# Patient Record
Sex: MALE | Race: WHITE | NOT HISPANIC OR LATINO | Employment: OTHER | URBAN - METROPOLITAN AREA
[De-identification: names, ages, dates, MRNs, and addresses within clinical notes are randomized per-mention and may not be internally consistent; named-entity substitution may affect disease eponyms.]

---

## 2017-05-04 ENCOUNTER — TRANSCRIBE ORDERS (OUTPATIENT)
Dept: URGENT CARE | Age: 62
End: 2017-05-04

## 2017-05-04 ENCOUNTER — OFFICE VISIT (OUTPATIENT)
Dept: URGENT CARE | Age: 62
End: 2017-05-04
Payer: COMMERCIAL

## 2017-05-04 ENCOUNTER — HOSPITAL ENCOUNTER (OUTPATIENT)
Dept: RADIOLOGY | Age: 62
Discharge: HOME | End: 2017-05-04
Attending: FAMILY MEDICINE | Admitting: FAMILY MEDICINE
Payer: COMMERCIAL

## 2017-05-04 DIAGNOSIS — S49.92XA UNSPECIFIED INJURY OF LEFT SHOULDER AND UPPER ARM, INITIAL ENCOUNTER: ICD-10-CM

## 2017-05-04 DIAGNOSIS — H49.21 SIXTH NERVE PALSY OF RIGHT EYE: ICD-10-CM

## 2017-05-04 LAB
ATRIAL RATE: 64 BPM
P AXIS: 36 DEGREES
PR INTERVAL: 196 MS
QRS AXIS: 4 DEGREES
QRSD INTERVAL: 116 MS
QT INTERVAL: 414 MS
QTC INTERVAL: 427 MS
T WAVE AXIS: 29 DEGREES
VENTRICULAR RATE: 64 BPM

## 2017-05-04 PROCEDURE — 73030 X-RAY EXAM OF SHOULDER: CPT

## 2017-05-04 PROCEDURE — 99203 OFFICE O/P NEW LOW 30 MIN: CPT | Performed by: FAMILY MEDICINE

## 2017-05-04 PROCEDURE — 72040 X-RAY EXAM NECK SPINE 2-3 VW: CPT

## 2017-05-04 PROCEDURE — 93005 ELECTROCARDIOGRAM TRACING: CPT

## 2017-05-09 ENCOUNTER — ALLSCRIPTS OFFICE VISIT (OUTPATIENT)
Dept: OTHER | Facility: OTHER | Age: 62
End: 2017-05-09

## 2017-05-09 ENCOUNTER — TRANSCRIBE ORDERS (OUTPATIENT)
Dept: ADMINISTRATIVE | Facility: HOSPITAL | Age: 62
End: 2017-05-09

## 2017-05-09 DIAGNOSIS — M54.12 BRACHIAL NEURITIS OR RADICULITIS NOS: Primary | ICD-10-CM

## 2017-05-10 ENCOUNTER — ALLSCRIPTS OFFICE VISIT (OUTPATIENT)
Dept: OTHER | Facility: OTHER | Age: 62
End: 2017-05-10

## 2017-05-18 ENCOUNTER — HOSPITAL ENCOUNTER (OUTPATIENT)
Dept: MRI IMAGING | Facility: HOSPITAL | Age: 62
Discharge: HOME | End: 2017-05-18
Payer: COMMERCIAL

## 2017-05-18 DIAGNOSIS — M54.12 BRACHIAL NEURITIS OR RADICULITIS NOS: ICD-10-CM

## 2017-05-18 PROCEDURE — 72141 MRI NECK SPINE W/O DYE: CPT

## 2017-05-22 ENCOUNTER — ALLSCRIPTS OFFICE VISIT (OUTPATIENT)
Dept: OTHER | Facility: OTHER | Age: 62
End: 2017-05-22

## 2017-05-23 ENCOUNTER — TRANSCRIBE ORDERS (OUTPATIENT)
Dept: ADMINISTRATIVE | Facility: HOSPITAL | Age: 62
End: 2017-05-23

## 2017-05-23 ENCOUNTER — ALLSCRIPTS OFFICE VISIT (OUTPATIENT)
Dept: OTHER | Facility: OTHER | Age: 62
End: 2017-05-23

## 2017-05-23 DIAGNOSIS — M54.12 BRACHIAL NEURITIS OR RADICULITIS NOS: Primary | ICD-10-CM

## 2017-05-23 DIAGNOSIS — H49.21 SIXTH NERVE PALSY OF RIGHT EYE: Primary | ICD-10-CM

## 2017-05-26 ENCOUNTER — ALLSCRIPTS OFFICE VISIT (OUTPATIENT)
Dept: OTHER | Facility: OTHER | Age: 62
End: 2017-05-26

## 2017-06-05 ENCOUNTER — HOSPITAL ENCOUNTER (OUTPATIENT)
Dept: NEUROLOGY | Facility: CLINIC | Age: 62
Discharge: HOME | End: 2017-06-05
Payer: COMMERCIAL

## 2017-06-05 DIAGNOSIS — M54.12 BRACHIAL NEURITIS OR RADICULITIS NOS: ICD-10-CM

## 2017-06-05 PROCEDURE — 95912 NRV CNDJ TEST 11-12 STUDIES: CPT

## 2017-06-05 PROCEDURE — 95886 MUSC TEST DONE W/N TEST COMP: CPT

## 2017-06-06 ENCOUNTER — HOSPITAL ENCOUNTER (OUTPATIENT)
Dept: CT IMAGING | Facility: HOSPITAL | Age: 62
Discharge: HOME | End: 2017-06-06
Attending: PSYCHIATRY & NEUROLOGY
Payer: COMMERCIAL

## 2017-06-06 DIAGNOSIS — H49.21 SIXTH NERVE PALSY OF RIGHT EYE: ICD-10-CM

## 2017-06-06 PROCEDURE — 70496 CT ANGIOGRAPHY HEAD: CPT

## 2017-06-06 RX ADMIN — IOHEXOL 100 ML: 350 INJECTION, SOLUTION INTRAVENOUS at 15:05

## 2017-06-19 ENCOUNTER — ALLSCRIPTS OFFICE VISIT (OUTPATIENT)
Dept: OTHER | Facility: OTHER | Age: 62
End: 2017-06-19

## 2017-06-19 DIAGNOSIS — E29.1 TESTICULAR HYPOFUNCTION: ICD-10-CM

## 2017-06-19 DIAGNOSIS — M54.12 RADICULOPATHY OF CERVICAL REGION: ICD-10-CM

## 2017-06-19 DIAGNOSIS — R53.83 OTHER FATIGUE: ICD-10-CM

## 2017-06-26 ENCOUNTER — APPOINTMENT (OUTPATIENT)
Dept: PHYSICAL THERAPY | Facility: CLINIC | Age: 62
End: 2017-06-26
Payer: COMMERCIAL

## 2017-06-26 ENCOUNTER — ALLSCRIPTS OFFICE VISIT (OUTPATIENT)
Dept: OTHER | Facility: OTHER | Age: 62
End: 2017-06-26

## 2017-06-26 PROCEDURE — 97161 PT EVAL LOW COMPLEX 20 MIN: CPT

## 2017-06-29 ENCOUNTER — APPOINTMENT (OUTPATIENT)
Dept: PHYSICAL THERAPY | Facility: CLINIC | Age: 62
End: 2017-06-29
Payer: COMMERCIAL

## 2017-06-29 PROCEDURE — 97110 THERAPEUTIC EXERCISES: CPT

## 2017-06-29 PROCEDURE — 97140 MANUAL THERAPY 1/> REGIONS: CPT

## 2017-07-03 ENCOUNTER — APPOINTMENT (OUTPATIENT)
Dept: PHYSICAL THERAPY | Facility: CLINIC | Age: 62
End: 2017-07-03
Payer: COMMERCIAL

## 2017-07-03 PROCEDURE — 97012 MECHANICAL TRACTION THERAPY: CPT

## 2017-07-03 PROCEDURE — 97110 THERAPEUTIC EXERCISES: CPT

## 2017-07-03 PROCEDURE — 97140 MANUAL THERAPY 1/> REGIONS: CPT

## 2017-07-05 ENCOUNTER — APPOINTMENT (OUTPATIENT)
Dept: PHYSICAL THERAPY | Facility: CLINIC | Age: 62
End: 2017-07-05
Payer: COMMERCIAL

## 2017-07-05 PROCEDURE — 97140 MANUAL THERAPY 1/> REGIONS: CPT

## 2017-07-05 PROCEDURE — 97110 THERAPEUTIC EXERCISES: CPT

## 2017-07-05 PROCEDURE — 97012 MECHANICAL TRACTION THERAPY: CPT

## 2017-07-06 ENCOUNTER — APPOINTMENT (OUTPATIENT)
Dept: PHYSICAL THERAPY | Facility: CLINIC | Age: 62
End: 2017-07-06
Payer: COMMERCIAL

## 2017-07-06 PROCEDURE — 97110 THERAPEUTIC EXERCISES: CPT

## 2017-07-06 PROCEDURE — 97012 MECHANICAL TRACTION THERAPY: CPT

## 2017-07-06 PROCEDURE — 97140 MANUAL THERAPY 1/> REGIONS: CPT

## 2017-07-11 ENCOUNTER — ALLSCRIPTS OFFICE VISIT (OUTPATIENT)
Dept: OTHER | Facility: OTHER | Age: 62
End: 2017-07-11

## 2017-07-11 ENCOUNTER — APPOINTMENT (OUTPATIENT)
Dept: PHYSICAL THERAPY | Facility: CLINIC | Age: 62
End: 2017-07-11
Payer: COMMERCIAL

## 2017-07-11 PROCEDURE — 97110 THERAPEUTIC EXERCISES: CPT

## 2017-07-11 PROCEDURE — 97012 MECHANICAL TRACTION THERAPY: CPT

## 2017-07-11 PROCEDURE — 97140 MANUAL THERAPY 1/> REGIONS: CPT

## 2017-07-12 ENCOUNTER — APPOINTMENT (OUTPATIENT)
Dept: PHYSICAL THERAPY | Facility: CLINIC | Age: 62
End: 2017-07-12
Payer: COMMERCIAL

## 2017-07-12 PROCEDURE — 97110 THERAPEUTIC EXERCISES: CPT

## 2017-07-12 PROCEDURE — 97140 MANUAL THERAPY 1/> REGIONS: CPT

## 2017-07-12 PROCEDURE — 97012 MECHANICAL TRACTION THERAPY: CPT

## 2017-07-13 ENCOUNTER — APPOINTMENT (OUTPATIENT)
Dept: PHYSICAL THERAPY | Facility: CLINIC | Age: 62
End: 2017-07-13
Payer: COMMERCIAL

## 2017-07-13 PROCEDURE — 97140 MANUAL THERAPY 1/> REGIONS: CPT

## 2017-07-13 PROCEDURE — 97110 THERAPEUTIC EXERCISES: CPT

## 2017-07-13 PROCEDURE — 97012 MECHANICAL TRACTION THERAPY: CPT

## 2017-07-18 ENCOUNTER — APPOINTMENT (OUTPATIENT)
Dept: PHYSICAL THERAPY | Facility: CLINIC | Age: 62
End: 2017-07-18
Payer: COMMERCIAL

## 2017-07-18 PROCEDURE — 97110 THERAPEUTIC EXERCISES: CPT

## 2017-07-18 PROCEDURE — 97140 MANUAL THERAPY 1/> REGIONS: CPT

## 2017-07-18 PROCEDURE — 97012 MECHANICAL TRACTION THERAPY: CPT

## 2017-07-19 ENCOUNTER — APPOINTMENT (OUTPATIENT)
Dept: PHYSICAL THERAPY | Facility: CLINIC | Age: 62
End: 2017-07-19
Payer: COMMERCIAL

## 2017-07-19 PROCEDURE — 97012 MECHANICAL TRACTION THERAPY: CPT

## 2017-07-19 PROCEDURE — 97140 MANUAL THERAPY 1/> REGIONS: CPT

## 2017-07-19 PROCEDURE — 97110 THERAPEUTIC EXERCISES: CPT

## 2017-07-20 ENCOUNTER — APPOINTMENT (OUTPATIENT)
Dept: PHYSICAL THERAPY | Facility: CLINIC | Age: 62
End: 2017-07-20
Payer: COMMERCIAL

## 2017-07-20 PROCEDURE — 97140 MANUAL THERAPY 1/> REGIONS: CPT

## 2017-07-20 PROCEDURE — 97012 MECHANICAL TRACTION THERAPY: CPT

## 2017-07-20 PROCEDURE — 97110 THERAPEUTIC EXERCISES: CPT

## 2017-07-24 ENCOUNTER — ALLSCRIPTS OFFICE VISIT (OUTPATIENT)
Dept: OTHER | Facility: OTHER | Age: 62
End: 2017-07-24

## 2017-07-24 DIAGNOSIS — M75.82 OTHER SHOULDER LESIONS, LEFT SHOULDER: ICD-10-CM

## 2017-07-25 ENCOUNTER — APPOINTMENT (OUTPATIENT)
Dept: PHYSICAL THERAPY | Facility: CLINIC | Age: 62
End: 2017-07-25
Payer: COMMERCIAL

## 2017-07-25 PROCEDURE — 97140 MANUAL THERAPY 1/> REGIONS: CPT

## 2017-07-25 PROCEDURE — 97110 THERAPEUTIC EXERCISES: CPT

## 2017-07-25 PROCEDURE — 97012 MECHANICAL TRACTION THERAPY: CPT

## 2017-07-26 ENCOUNTER — APPOINTMENT (OUTPATIENT)
Dept: PHYSICAL THERAPY | Facility: CLINIC | Age: 62
End: 2017-07-26
Payer: COMMERCIAL

## 2017-07-26 ENCOUNTER — GENERIC CONVERSION - ENCOUNTER (OUTPATIENT)
Dept: OTHER | Facility: OTHER | Age: 62
End: 2017-07-26

## 2017-07-26 PROCEDURE — 97140 MANUAL THERAPY 1/> REGIONS: CPT

## 2017-07-26 PROCEDURE — 97110 THERAPEUTIC EXERCISES: CPT

## 2017-07-26 PROCEDURE — 97012 MECHANICAL TRACTION THERAPY: CPT

## 2017-07-27 ENCOUNTER — APPOINTMENT (OUTPATIENT)
Dept: PHYSICAL THERAPY | Facility: CLINIC | Age: 62
End: 2017-07-27
Payer: COMMERCIAL

## 2017-07-27 PROCEDURE — 97012 MECHANICAL TRACTION THERAPY: CPT

## 2017-07-27 PROCEDURE — 97110 THERAPEUTIC EXERCISES: CPT

## 2017-07-27 PROCEDURE — 97140 MANUAL THERAPY 1/> REGIONS: CPT

## 2017-07-31 ENCOUNTER — ALLSCRIPTS OFFICE VISIT (OUTPATIENT)
Dept: OTHER | Facility: OTHER | Age: 62
End: 2017-07-31

## 2017-08-01 ENCOUNTER — APPOINTMENT (OUTPATIENT)
Dept: PHYSICAL THERAPY | Facility: CLINIC | Age: 62
End: 2017-08-01
Payer: COMMERCIAL

## 2017-08-02 ENCOUNTER — APPOINTMENT (OUTPATIENT)
Dept: PHYSICAL THERAPY | Facility: CLINIC | Age: 62
End: 2017-08-02
Payer: COMMERCIAL

## 2017-08-02 PROCEDURE — 97012 MECHANICAL TRACTION THERAPY: CPT

## 2017-08-02 PROCEDURE — 97110 THERAPEUTIC EXERCISES: CPT

## 2017-08-02 PROCEDURE — 97140 MANUAL THERAPY 1/> REGIONS: CPT

## 2017-08-03 ENCOUNTER — APPOINTMENT (OUTPATIENT)
Dept: PHYSICAL THERAPY | Facility: CLINIC | Age: 62
End: 2017-08-03
Payer: COMMERCIAL

## 2017-08-03 ENCOUNTER — GENERIC CONVERSION - ENCOUNTER (OUTPATIENT)
Dept: OTHER | Facility: OTHER | Age: 62
End: 2017-08-03

## 2017-08-03 PROCEDURE — 97140 MANUAL THERAPY 1/> REGIONS: CPT

## 2017-08-03 PROCEDURE — 97012 MECHANICAL TRACTION THERAPY: CPT

## 2017-08-03 PROCEDURE — 97110 THERAPEUTIC EXERCISES: CPT

## 2017-08-24 ENCOUNTER — ALLSCRIPTS OFFICE VISIT (OUTPATIENT)
Dept: OTHER | Facility: OTHER | Age: 62
End: 2017-08-24

## 2017-09-12 ENCOUNTER — APPOINTMENT (OUTPATIENT)
Dept: PHYSICAL THERAPY | Facility: CLINIC | Age: 62
End: 2017-09-12
Payer: COMMERCIAL

## 2017-09-18 ENCOUNTER — APPOINTMENT (OUTPATIENT)
Dept: PHYSICAL THERAPY | Facility: CLINIC | Age: 62
End: 2017-09-18
Payer: COMMERCIAL

## 2017-09-18 ENCOUNTER — GENERIC CONVERSION - ENCOUNTER (OUTPATIENT)
Dept: OTHER | Facility: OTHER | Age: 62
End: 2017-09-18

## 2017-09-18 PROCEDURE — G8985 CARRY GOAL STATUS: HCPCS

## 2017-09-18 PROCEDURE — 97161 PT EVAL LOW COMPLEX 20 MIN: CPT

## 2017-09-18 PROCEDURE — G8984 CARRY CURRENT STATUS: HCPCS

## 2017-09-18 PROCEDURE — 97110 THERAPEUTIC EXERCISES: CPT

## 2017-09-21 ENCOUNTER — APPOINTMENT (OUTPATIENT)
Dept: PHYSICAL THERAPY | Facility: CLINIC | Age: 62
End: 2017-09-21
Payer: COMMERCIAL

## 2017-09-21 PROCEDURE — 97110 THERAPEUTIC EXERCISES: CPT

## 2017-09-21 PROCEDURE — 97140 MANUAL THERAPY 1/> REGIONS: CPT

## 2017-09-25 ENCOUNTER — APPOINTMENT (OUTPATIENT)
Dept: PHYSICAL THERAPY | Facility: CLINIC | Age: 62
End: 2017-09-25
Payer: COMMERCIAL

## 2017-09-25 PROCEDURE — 97140 MANUAL THERAPY 1/> REGIONS: CPT

## 2017-09-25 PROCEDURE — 97110 THERAPEUTIC EXERCISES: CPT

## 2017-09-25 PROCEDURE — 97012 MECHANICAL TRACTION THERAPY: CPT

## 2017-09-28 ENCOUNTER — APPOINTMENT (OUTPATIENT)
Dept: PHYSICAL THERAPY | Facility: CLINIC | Age: 62
End: 2017-09-28
Payer: COMMERCIAL

## 2017-09-28 PROCEDURE — 97110 THERAPEUTIC EXERCISES: CPT

## 2017-09-28 PROCEDURE — 97140 MANUAL THERAPY 1/> REGIONS: CPT

## 2017-09-28 PROCEDURE — 97012 MECHANICAL TRACTION THERAPY: CPT

## 2017-09-29 ENCOUNTER — ALLSCRIPTS OFFICE VISIT (OUTPATIENT)
Dept: OTHER | Facility: OTHER | Age: 62
End: 2017-09-29

## 2017-10-02 ENCOUNTER — APPOINTMENT (OUTPATIENT)
Dept: PHYSICAL THERAPY | Facility: CLINIC | Age: 62
End: 2017-10-02
Payer: COMMERCIAL

## 2017-10-02 PROCEDURE — 97012 MECHANICAL TRACTION THERAPY: CPT

## 2017-10-02 PROCEDURE — 97110 THERAPEUTIC EXERCISES: CPT

## 2017-10-02 PROCEDURE — 97140 MANUAL THERAPY 1/> REGIONS: CPT

## 2017-10-05 ENCOUNTER — APPOINTMENT (OUTPATIENT)
Dept: PHYSICAL THERAPY | Facility: CLINIC | Age: 62
End: 2017-10-05
Payer: COMMERCIAL

## 2017-10-05 PROCEDURE — 97140 MANUAL THERAPY 1/> REGIONS: CPT

## 2017-10-05 PROCEDURE — 97012 MECHANICAL TRACTION THERAPY: CPT

## 2017-10-05 PROCEDURE — 97110 THERAPEUTIC EXERCISES: CPT

## 2017-10-18 ENCOUNTER — APPOINTMENT (OUTPATIENT)
Dept: PHYSICAL THERAPY | Facility: CLINIC | Age: 62
End: 2017-10-18
Payer: COMMERCIAL

## 2017-10-19 ENCOUNTER — APPOINTMENT (OUTPATIENT)
Dept: PHYSICAL THERAPY | Facility: CLINIC | Age: 62
End: 2017-10-19
Payer: COMMERCIAL

## 2017-10-19 PROCEDURE — 97012 MECHANICAL TRACTION THERAPY: CPT

## 2017-10-19 PROCEDURE — 97140 MANUAL THERAPY 1/> REGIONS: CPT

## 2017-10-19 PROCEDURE — 97110 THERAPEUTIC EXERCISES: CPT

## 2017-10-24 ENCOUNTER — APPOINTMENT (OUTPATIENT)
Dept: PHYSICAL THERAPY | Facility: CLINIC | Age: 62
End: 2017-10-24
Payer: COMMERCIAL

## 2017-10-25 ENCOUNTER — APPOINTMENT (OUTPATIENT)
Dept: PHYSICAL THERAPY | Facility: CLINIC | Age: 62
End: 2017-10-25
Payer: COMMERCIAL

## 2017-10-25 PROCEDURE — 97110 THERAPEUTIC EXERCISES: CPT

## 2017-10-25 PROCEDURE — 97140 MANUAL THERAPY 1/> REGIONS: CPT

## 2017-10-25 PROCEDURE — 97012 MECHANICAL TRACTION THERAPY: CPT

## 2017-10-29 NOTE — PROGRESS NOTES
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 81)   6  Cervical stenosis of spinal canal (723 0) (M48 02)    Plan   Cervical spondylosis with radiculopathy    · Gabapentin 100 MG Oral Capsule; TAKE 1 CAPSULE BEDTIME MAY INCREASE TO3 CAPSULES AT BEDTIME AS TOLERATED   Rx By: Paty Calix; Dispense: 30 Days ; #:90 Capsule; Refill: 2;Cervical spondylosis with radiculopathy; MARCO A = N; Verified Transmission to [facility]; Last Updated By: SystemMasabi; 9/29/2017 10:28:24 AM  Cervical stenosis of spinal canal, Chronic left shoulder pain, Foraminal stenosis ofcervical region    · Diclofenac Potassium 50 MG Oral Tablet; One tablet twice daily with food forarthritic pain   Rx By: Paty Calix; Dispense: 0 Days ; #:60 Tablet; Refill: 2;For: Cervical stenosis of spinal canal, Chronic left shoulder pain, Foraminal stenosis of cervical region; MARCO A = N; Verified Transmission to [facility]; Last Updated By: SystemMasabi; 9/29/2017 10:28:25 AM  Left cervical radiculopathy    · Ibuprofen 800 MG Oral Tablet   Rx By: Miguel Ramesh; Dispense: 30 Days ; #:90 Tablet; Refill: 3;Left cervical radiculopathy; MARCO A = N; Sent To: [facility] [id number]; Last Updated By: Paty Calix; 9/29/2017 10:25:21 AM  Follow-up visit in 2 months Evaluation and Treatment  Follow-up  Status: Hold For - Scheduling  Requested for: 07RPM3414 Ordered; For: Cervical stenosis of spinal canal;  Ordered By: Paty Calix  Performed:   Due: [id number] Follow-up visit in 2 months Evaluation and Treatment  Follow-up  Status: Hold For - Scheduling  Requested for: 55SFU5035 Ordered;   For: Cervical disc herniation, Cervical spondylosis with radiculopathy, Cervical stenosis of spinal canal, Chronic left shoulder pain; Ordered By: Jackie Delgado  Performed:   Due: 29OYK2902   Discussion/Summary    This is a 71-year-old male patient presents the office for follow-up visit today  The patient is currently being treated for cervical disc herniation, cervical spondylosis with radiculopathy, cervical stenosis of the spinal canal, chronic left shoulder pain, myofascial pain, and foraminal stenosis of the cervical region  The patient was attending physical therapy 3 times a week and has now completed this and reports that while he was doing the physical therapy that he did experience pain relief, however, the pain has returned in his neck and left arm  The patient reports that he does have neck pain and also myofascial pain in his shoulders  He reports that he does experience radicular symptoms into his left upper extremity at times in the C6 dermatomal distribution  He reports that he will rarely occasionally take ibuprofen 800 mg for his pain symptoms as he reports that this is not very effective at reducing his pain  The patient reports that at this time he would like to still try conservative treatment before moving forward with any type of injections at this time  plan for this patient is as follows:I will discontinue the patient's ibuprofen 800 mg 3 times a day when necessary for his pain complaints  The patient reports that this medication is not effective at decreasing his pain  I will start this patient on diclofenac 50 mg twice a day when necessary for his neck pain  The patient to take this medication with food and should avoid all other NSAIDs on this medication  I will start this patient on gabapentin 100 mg at bedtime and he can titrate up to 300 mg at bedtime as tolerated  I will provide this patient with a titration schedule to safely increase this medication  This patient may benefit from a muscle relaxer in the future, however, the patient does not want to take too many medications at this point    This patient may benefit from a cervical epidural steroid injection in the future, however, the patient would like to try more conservative treatment first   This patient may benefit from trigger point injections to the bilateral trapezius muscles to alleviate his muscle spasm in the future  The patient should continue with his home exercise program utilizing the exercises that he has learned in physical therapy to help keep his pain at a minimum  The patient should incorporate this at least 2 times a day in 10 minute increments  I will follow-up with this patient in 2 months  The patient has the current Goals: To provide this patient with adequate pain relief to improve quality of life and level of functioning  The patent has the current Barriers: Chronic pain syndrome  Chief Complaint    1  Pain  Neck and left arm pain  History of Present Illness  This is a 43-year-old male patient presents the office for follow-up visit today  The patient is currently being treated for cervical disc herniation, cervical spondylosis with radiculopathy, cervical stenosis of the spinal canal, chronic left shoulder pain, myofascial pain, and foraminal stenosis of the cervical region  The patient was attending physical therapy 3 times a week and has now completed this and reports that while he was doing the physical therapy that he did experience pain relief, however, the pain has returned in his neck and left arm  The patient reports that he does have neck pain and also myofascial pain in his shoulders  He reports that he does experience radicular symptoms into his left upper extremity at times in the C6 dermatomal distribution  He reports that he will rarely occasionally take ibuprofen 800 mg for his pain symptoms as he reports that this is not very effective at reducing his pain   The patient reports that at this time he would like to still try conservative treatment before moving forward with any type of injections at this time   patient reports that his pain is better since his previous visit rates his pain a 3?8/10 on the numerical pain rating scale  The patient reports that his pain is worse in the evening and night and that his pain is constant and describes the pain as burning, pressure like, and pins and needles-like in nature  The patient reports that the amount of pain relief that he is obtaining now with his current medication regimen and treatment plan is not enough to make a real difference in his life   have personally reviewed and/or updated the patient's past medical history, past surgical history, family history, social history, allergies, and vital signs today  Other than as stated above, the patient denies any interval changes in medications, medical condition, mental condition, symptoms, or allergies since last office visit  Janneth Serra presents with complaints of constant episodes of bilateral neck, bilateral chest, bilateral upper back and left shoulder pain, described as burning, radiating to the bilateral upper extremity  On a scale of 1 to 10, the patient rates the pain as 3  Symptoms are improving  Review of Systems   Constitutional: no fever,-- no recent weight gain-- and-- no recent weight loss  Eyes: no double vision-- and-- no blurry vision  Cardiovascular: no chest pain,-- no palpitations-- and-- no lower extremity edema  Respiratory: no complaints of shortness of breath-- and-- no wheezing  Musculoskeletal: difficulty walking,-- joint stiffness-- and-- pain in extremity neck, shoulders , but-- no muscle weakness,-- no joint swelling,-- no limb swelling-- and-- no decreased range of motion  Neurological: no dizziness,-- no difficulty swallowing,-- no memory loss,-- no loss of consciousness-- and-- no seizures  Gastrointestinal: no nausea,-- no vomiting,-- no constipation-- and-- no diarrhea    Genitourinary: no difficulty initiating urine stream,-- no genital pain-- and-- no frequent urination  Integumentary: no complaints of skin rash  Psychiatric: no depression  Endocrine: no excessive thirst,-- no adrenal disease,-- no hypothyroidism-- and-- no hyperthyroidism  Hematologic/Lymphatic: no tendency for easy bruising-- and-- no tendency for easy bleeding  ROS reviewed  Active Problems  1  BPH (benign prostatic hyperplasia) (600 00) (N40 0)   2  Cervical disc herniation (722 0) (M50 20)   3  Cervical spondylosis with radiculopathy (721 0) (M47 22)   4  Cervical stenosis of spinal canal (723 0) (M48 02)   5  Chronic left shoulder pain (719 41,338 29) (M25 512,G89 29)   6  Daytime hypersomnolence (780 54) (G47 19)   7  DM (diabetes mellitus screen) (V77 1) (Z13 1)   8  Fatigue (780 79) (R53 83)   9  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 81)   10  Influenza vaccine needed (V04 81) (Z23)   11  Insomnia (780 52) (G47 00)   12  Left cervical radiculopathy (723 4) (M54 12)   13  Low testosterone (259 9) (E34 9)   14  Lumbar disc disease (722 93) (M51 9)   15  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)   16  Myofascial pain (729 1) (M79 1)   17  Numbness of right anterior thigh (782 0) (R20 8)   18  Right hip pain (719 45) (M25 551)   19  Screening, lipid (V77 91) (Z13 220)   20  Sixth nerve palsy, right (378 54) (H49 21)   21  Tendinitis of left rotator cuff (726 10) (M75 82)    Past Medical History  1  History of Anxiety (300 00) (F41 9)   2  History of Lightheadedness (780 4) (R42)   3  History of Partial small bowel obstruction (560 9) (K56 600)   4  History of Shoulder injury, left, initial encounter (959 2) (S49 92XA)   5  History of Sixth cranial nerve palsy (378 54) (H49 20)    The active problems and past medical history were reviewed and updated today  Surgical History  1  History of Sinus Surgery   2  History of Tonsillectomy    The surgical history was reviewed and updated today  Family History  Mother    1  Family history of Old age  Unknown    2  Family history of Back disorder  Family History    3  Family history of diabetes mellitus (V18 0) (Z83 3)    The family history was reviewed and updated today  Social History     · Former smoker (F45 09) (J72 701)   · Social alcohol use (Z78 9)  The social history was reviewed and updated today  The social history was reviewed and is unchanged  Current Meds   1  Cialis 5 MG Oral Tablet; TAKE AS DIRECTED; Therapy: 30Jqj3095 to Recorded   2  Ibuprofen 800 MG Oral Tablet; 1 po tid prn; Therapy: 64VFI5931 to (Evaluate:17Oct2017)  Requested for: 61SMP0662; Last Rx:19Jun2017 Ordered    The medication list was reviewed and updated today  Allergies  1  Codeine  2  Seasonal    Vitals  Vital Signs    Recorded: 08YSA4950 09:43AM   Temperature 98 8 F   Heart Rate 88   Respiration 16   Systolic 510   Diastolic 70   Height 6 ft 1 in   Weight 238 lb    BMI Calculated 31 4   BSA Calculated 2 32   Pain Scale 3-8       Physical Exam   Constitutional  General appearance: Well developed, well nourished, alert, in no distress, non-toxic and no overt pain behavior  Eyes  Sclera: anicteric  HEENT  Hearing grossly intact  Neck  Neck: Supple, symmetric, trachea midline, no masses  Pulmonary  Respiratory effort: Even and unlabored  Cardiovascular  Examination of extremities: No edema or pitting edema present  Abdomen  Abdomen: Soft, non-tender, non-distended  Skin  Skin and subcutaneous tissue: Normal without rashes or lesions, well hydrated  Psychiatric  Mood and affect: Mood and affect appropriate  Neurologic  Cranial nerves: Cranial nerves II-XII grossly intact  Musculoskeletal  Gait and station: Normal    Cervical Spine examination demonstrates  5/5 upper extremity strength in all muscle groups bilaterally  Cervical paraspinals and bilateral trapezii muscles tender to palpation with significant muscle spasms noted  Positive Spurling's maneuver on the left        Future Appointments    Date/Time Provider Specialty Site   11/24/2017 11:00 AM SRUTHI Doty Pain Management [facility]   08/28/2018 11:30 AM Lucita Limon MD Neurology [facility]   12/21/2017 10:30 AM JAZZMINE Sanderson   [facility]       Signatures   Electronically signed by : Alfie Brizuela; Sep 29 2017 11:07AM EST                       (Author)    Electronically signed by : Karen Hernandez DO; Oct 13 2017 12:06PM EST

## 2017-11-01 ENCOUNTER — APPOINTMENT (OUTPATIENT)
Dept: PHYSICAL THERAPY | Facility: CLINIC | Age: 62
End: 2017-11-01
Payer: COMMERCIAL

## 2017-11-01 PROCEDURE — 97140 MANUAL THERAPY 1/> REGIONS: CPT

## 2017-11-01 PROCEDURE — 97110 THERAPEUTIC EXERCISES: CPT

## 2017-11-01 PROCEDURE — 97012 MECHANICAL TRACTION THERAPY: CPT

## 2017-11-06 ENCOUNTER — APPOINTMENT (OUTPATIENT)
Dept: PHYSICAL THERAPY | Facility: CLINIC | Age: 62
End: 2017-11-06
Payer: COMMERCIAL

## 2017-11-06 PROCEDURE — 97140 MANUAL THERAPY 1/> REGIONS: CPT

## 2017-11-06 PROCEDURE — 97110 THERAPEUTIC EXERCISES: CPT

## 2017-11-06 PROCEDURE — 97012 MECHANICAL TRACTION THERAPY: CPT

## 2017-11-08 ENCOUNTER — APPOINTMENT (OUTPATIENT)
Dept: PHYSICAL THERAPY | Facility: CLINIC | Age: 62
End: 2017-11-08
Payer: COMMERCIAL

## 2017-11-08 PROCEDURE — 97012 MECHANICAL TRACTION THERAPY: CPT

## 2017-11-08 PROCEDURE — G8985 CARRY GOAL STATUS: HCPCS

## 2017-11-08 PROCEDURE — 97110 THERAPEUTIC EXERCISES: CPT

## 2017-11-08 PROCEDURE — 97140 MANUAL THERAPY 1/> REGIONS: CPT

## 2017-11-08 PROCEDURE — G8984 CARRY CURRENT STATUS: HCPCS

## 2017-11-13 ENCOUNTER — APPOINTMENT (OUTPATIENT)
Dept: PHYSICAL THERAPY | Facility: CLINIC | Age: 62
End: 2017-11-13
Payer: COMMERCIAL

## 2017-11-13 PROCEDURE — 97110 THERAPEUTIC EXERCISES: CPT

## 2017-11-13 PROCEDURE — 97012 MECHANICAL TRACTION THERAPY: CPT

## 2017-11-13 PROCEDURE — 97140 MANUAL THERAPY 1/> REGIONS: CPT

## 2017-11-14 ENCOUNTER — APPOINTMENT (OUTPATIENT)
Dept: PHYSICAL THERAPY | Facility: CLINIC | Age: 62
End: 2017-11-14
Payer: COMMERCIAL

## 2017-11-21 ENCOUNTER — APPOINTMENT (OUTPATIENT)
Dept: PHYSICAL THERAPY | Facility: CLINIC | Age: 62
End: 2017-11-21
Payer: COMMERCIAL

## 2017-11-21 PROCEDURE — 97012 MECHANICAL TRACTION THERAPY: CPT

## 2017-11-21 PROCEDURE — 97140 MANUAL THERAPY 1/> REGIONS: CPT

## 2017-11-21 PROCEDURE — 97110 THERAPEUTIC EXERCISES: CPT

## 2017-11-27 ENCOUNTER — APPOINTMENT (OUTPATIENT)
Dept: PHYSICAL THERAPY | Facility: CLINIC | Age: 62
End: 2017-11-27
Payer: COMMERCIAL

## 2017-11-27 PROCEDURE — 97140 MANUAL THERAPY 1/> REGIONS: CPT

## 2017-11-27 PROCEDURE — 97110 THERAPEUTIC EXERCISES: CPT

## 2017-11-27 PROCEDURE — 97012 MECHANICAL TRACTION THERAPY: CPT

## 2017-12-01 ENCOUNTER — ALLSCRIPTS OFFICE VISIT (OUTPATIENT)
Dept: OTHER | Facility: OTHER | Age: 62
End: 2017-12-01

## 2017-12-04 ENCOUNTER — GENERIC CONVERSION - ENCOUNTER (OUTPATIENT)
Dept: OBGYN CLINIC | Facility: OTHER | Age: 62
End: 2017-12-04

## 2017-12-04 ENCOUNTER — APPOINTMENT (OUTPATIENT)
Dept: PHYSICAL THERAPY | Facility: CLINIC | Age: 62
End: 2017-12-04
Payer: COMMERCIAL

## 2017-12-04 PROCEDURE — G8984 CARRY CURRENT STATUS: HCPCS

## 2017-12-04 PROCEDURE — 97110 THERAPEUTIC EXERCISES: CPT

## 2017-12-04 PROCEDURE — 97012 MECHANICAL TRACTION THERAPY: CPT

## 2017-12-04 PROCEDURE — 97140 MANUAL THERAPY 1/> REGIONS: CPT

## 2017-12-04 PROCEDURE — G8985 CARRY GOAL STATUS: HCPCS

## 2017-12-05 NOTE — PROGRESS NOTES
Assessment    1  Cervical spondylosis with radiculopathy (721 0) (M47 22)   2  Cervical stenosis of spinal canal (723 0) (M48 02)   3  Cervical disc herniation (722 0) (M50 20)   4  Left cervical radiculopathy (723 4) (M54 12)   5  Myofascial pain (729 1) (M79 1)   6  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 81)    Plan  72-year-old male returning for follow-up of neck pain and cervical radiculopathy in the C6 distribution of the left upper extremity  The patient does have cervical degenerative disc disease, spondylosis, and central and foraminal stenosis most pronounced at C4-5 and C5-6 with impingement of the left C6 nerve root  The patient has been doing quite well with conservative therapy including physical therapy, gabapentin 300 mg q h s , and diclofenac 50 mg b i d  p r n  [place] Seth he is getting approximately 95% pain relief with the current regimen and is quite satisfied with the relief he is getting  He is not experiencing any side effects from the medications  1  if the patient's symptoms become more persistent or worsen we may consider a cervical epidural steroid injection 2  We will continue gabapentin 300 mg q h s  3  we will continue diclofenac 50 mg b i d  p r n  4  the patient will continue with his home exercise program as taught to him by physical therapy  5  I will follow up the patient in 6 months or sooner if needed      Discussion/Summary  The patient has the current Goals: Reduced pain and improved function  The patent has the current Barriers: Cervical stenosis  Patient is able to Self-Care  Possible side effects of new medications were reviewed with the patient/guardian today  The treatment plan was reviewed with the patient/guardian   The patient/guardian understands and agrees with the treatment plan   The patient was counseled regarding diagnostic results,-- instructions for management,-- risk factor reductions,-- prognosis,-- patient and family education,-- impressions,-- risks and benefits of treatment options-- and-- importance of compliance with treatment  total time of encounter was 15 minutes  Self Referrals: No      Chief Complaint    1  Neck Pain  Neck and left arm pain      History of Present Illness  81-year-old male returning for follow-up of neck pain and cervical radiculopathy in the C6 distribution of the left upper extremity  The patient does have cervical degenerative disc disease, spondylosis, and central and foraminal stenosis most pronounced at C4-5 and C5-6 with impingement of the left C6 nerve root  The patient has been doing quite well with conservative therapy including physical therapy, gabapentin 300 mg q h s , and diclofenac 50 mg b i d  p r n  He does have occasional numbness and paresthesias and pain in the left upper extremity, however this is quite rare at this point  He is getting approximately 95% pain relief from his current regimen  He is not experiencing any side effects from the medications  The patient's neck pain is quite manageable as well  He denies any right upper extremity symptoms, bladder or bowel incontinence, or balance issues  patient rates his pain a 1/10 on the pain is worse at night  The pain is occasional and described as pressure-like, numbness, and pins and needles  The pain is worse with bending and twisting his neck, lifting, and coughing/ sneezing  The pain is alleviated with sitting, relaxation, exercise, and lying down  I have personally reviewed and/or updated the patient's past medical history, past surgical history, family history, social history, allergies, and vital signs today  Other than as stated above, the patient denies any interval changes in medications, medical condition, mental condition, symptoms, or allergies since the last office visit  Keiko Obrien presents with complaints of occasional episodes of bilateral posterior neck pain, radiating to the left trapezius and left arm   On a scale of 1 to 10, the patient rates the pain as 1  Symptoms are improving  Review of Systems   Constitutional: no fever,-- no recent weight gain-- and-- no recent weight loss  Eyes: no double vision-- and-- no blurry vision  Cardiovascular: no chest pain,-- no palpitations-- and-- no lower extremity edema  Respiratory: no complaints of shortness of breath-- and-- no wheezing  Musculoskeletal: no difficulty walking,-- no muscle weakness,-- no joint stiffness,-- no joint swelling,-- no limb swelling,-- no pain in extremity-- and-- no decreased range of motion  Neurological: no dizziness,-- no difficulty swallowing,-- no memory loss,-- no loss of consciousness-- and-- no seizures  Gastrointestinal: no nausea,-- no vomiting,-- no constipation-- and-- no diarrhea  Genitourinary: no difficulty initiating urine stream,-- no genital pain-- and-- no frequent urination  Integumentary: no complaints of skin rash  Psychiatric: no depression  Endocrine: no excessive thirst,-- no adrenal disease,-- no hypothyroidism-- and-- no hyperthyroidism  Hematologic/Lymphatic: no tendency for easy bruising-- and-- no tendency for easy bleeding  ROS reviewed  Active Problems  1  BPH (benign prostatic hyperplasia) (600 00) (N40 0)   2  Cervical disc herniation (722 0) (M50 20)   3  Cervical spondylosis with radiculopathy (721 0) (M47 22)   4  Cervical stenosis of spinal canal (723 0) (M48 02)   5  Chronic left shoulder pain (719 41,338 29) (M25 512,G89 29)   6  Daytime hypersomnolence (780 54) (G47 19)   7  DM (diabetes mellitus screen) (V77 1) (Z13 1)   8  Fatigue (780 79) (R53 83)   9  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 81)   10  Influenza vaccine needed (V04 81) (Z23)   11  Insomnia (780 52) (G47 00)   12  Left cervical radiculopathy (723 4) (M54 12)   13  Low testosterone (259 9) (E34 9)   14  Lumbar disc disease (722 93) (M51 9)   15  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)   16   Myofascial pain (872 1) (M79 1)   17  Need for influenza vaccination (V04 81) (Z23)   18  Numbness of right anterior thigh (782 0) (R20 8)   19  Right hip pain (719 45) (M25 551)   20  Screening, lipid (V77 91) (Z13 220)   21  Sixth nerve palsy, right (378 54) (H49 21)   22  Tendinitis of left rotator cuff (726 10) (M75 82)    Past Medical History  1  History of Anxiety (300 00) (F41 9)   2  History of Lightheadedness (780 4) (R42)   3  History of Partial small bowel obstruction (560 9) (K56 600)   4  History of Shoulder injury, left, initial encounter (959 2) (S49 92XA)   5  History of Sixth cranial nerve palsy (378 54) (H49 20)    Surgical History  1  History of Sinus Surgery   2  History of Tonsillectomy    Family History  Mother    1  Family history of Old age  Unknown    2  Family history of Back disorder  Family History    3  Family history of diabetes mellitus (V18 0) (Z83 3)    Social History     · Former smoker (V15 82) (M46 385)   · Social alcohol use (Z78 9)    Current Meds   1  Cialis 5 MG Oral Tablet; TAKE AS DIRECTED; Therapy: 34Qgx0579 to Recorded   2  Diclofenac Potassium 50 MG Oral Tablet; One tablet twice daily with food for arthritic pain; Therapy: 47GZP4415 to (Last Cari Gomez)  Requested for: 46Btw5351 Ordered   3  Gabapentin 100 MG Oral Capsule; TAKE 1 CAPSULE BEDTIME MAY INCREASE TO 3 CAPSULES AT BEDTIME AS TOLERATED; Therapy: 40PZI8352 to (Evaluate:78Exs3783)  Requested for: 80REM3591; Last Rx:22Yvz4515 Ordered    Allergies  1  Codeine  2  Seasonal    Vitals  Vital Signs    Recorded: 87VUL6765 01:15PM   Temperature 98 2 F   Heart Rate 89   Systolic 460   Diastolic 81   Height 6 ft 1 in   Weight 241 lb    BMI Calculated 31 8   BSA Calculated 2 33   Pain Scale 1       Physical Exam   Constitutional  General appearance: Well developed, well nourished, alert, in no distress, non-toxic and no overt pain behavior  Eyes  Sclera: anicteric  HEENT  Hearing grossly intact     Neck  Neck: Supple, symmetric, trachea midline, no masses  Pulmonary  Respiratory effort: Even and unlabored  Abdomen  Abdomen: Soft, non-tender, non-distended  Skin  Skin and subcutaneous tissue: Normal without rashes or lesions, well hydrated  Psychiatric  Mood and affect: Mood and affect appropriate  Neurologic  the muscle tone was normal  Musculoskeletal  Gait and station: Normal    Cervical Spine examination demonstrates  Bilateral cervical paraspinals tender to palpation with muscle spasms noted bilaterally on the left more than the right  Positive Spurling's to the left and negative to the right  Bilateral upper extremity strength 5/5 in all muscle groups  Results/Data  Results Free Text Form [facility]:   Results    I personally reviewed the films/images in the office today  Future Appointments    Date/Time Provider Specialty Site   08/28/2018 11:30 AM Sammy Hameed MD Neurology [facility]   12/21/2017 10:30 AM JAZZMINE Salamanca   [facility]   05/18/2018 01:00 PM Linda Tomlin DO Pain Management [address]       Signatures   Electronically signed by : Carlos Moulton DO; Dec  1 2017  5:35PM EST                       (Author)

## 2017-12-11 ENCOUNTER — APPOINTMENT (OUTPATIENT)
Dept: PHYSICAL THERAPY | Facility: CLINIC | Age: 62
End: 2017-12-11
Payer: COMMERCIAL

## 2017-12-11 PROCEDURE — 97110 THERAPEUTIC EXERCISES: CPT

## 2017-12-11 PROCEDURE — 97012 MECHANICAL TRACTION THERAPY: CPT

## 2017-12-11 PROCEDURE — 97140 MANUAL THERAPY 1/> REGIONS: CPT

## 2017-12-18 ENCOUNTER — ALLSCRIPTS OFFICE VISIT (OUTPATIENT)
Dept: OTHER | Facility: OTHER | Age: 62
End: 2017-12-18

## 2017-12-18 LAB
BILIRUB UR QL STRIP: NORMAL
CLARITY UR: NORMAL
COLOR UR: YELLOW
GLUCOSE (HISTORICAL): NORMAL
HGB UR QL STRIP.AUTO: NORMAL
KETONES UR STRIP-MCNC: NORMAL MG/DL
LEUKOCYTE ESTERASE UR QL STRIP: NORMAL
NITRITE UR QL STRIP: NORMAL
PH UR STRIP.AUTO: 6.5 [PH]
PROT UR STRIP-MCNC: NORMAL MG/DL
SP GR UR STRIP.AUTO: 1.01
UROBILINOGEN UR QL STRIP.AUTO: 0.2

## 2017-12-19 ENCOUNTER — APPOINTMENT (OUTPATIENT)
Dept: PHYSICAL THERAPY | Facility: CLINIC | Age: 62
End: 2017-12-19
Payer: COMMERCIAL

## 2017-12-19 PROCEDURE — 97110 THERAPEUTIC EXERCISES: CPT

## 2017-12-19 PROCEDURE — 97012 MECHANICAL TRACTION THERAPY: CPT

## 2017-12-19 PROCEDURE — 97140 MANUAL THERAPY 1/> REGIONS: CPT

## 2017-12-21 ENCOUNTER — ALLSCRIPTS OFFICE VISIT (OUTPATIENT)
Dept: OTHER | Facility: OTHER | Age: 62
End: 2017-12-21

## 2017-12-27 ENCOUNTER — APPOINTMENT (OUTPATIENT)
Dept: PHYSICAL THERAPY | Facility: CLINIC | Age: 62
End: 2017-12-27
Payer: COMMERCIAL

## 2017-12-27 PROCEDURE — 97012 MECHANICAL TRACTION THERAPY: CPT

## 2017-12-27 PROCEDURE — G8985 CARRY GOAL STATUS: HCPCS

## 2017-12-27 PROCEDURE — 97110 THERAPEUTIC EXERCISES: CPT

## 2017-12-27 PROCEDURE — 97140 MANUAL THERAPY 1/> REGIONS: CPT

## 2018-01-13 VITALS
TEMPERATURE: 98.6 F | DIASTOLIC BLOOD PRESSURE: 80 MMHG | BODY MASS INDEX: 30.09 KG/M2 | HEIGHT: 73 IN | SYSTOLIC BLOOD PRESSURE: 130 MMHG | HEART RATE: 87 BPM | WEIGHT: 227 LBS

## 2018-01-13 VITALS
SYSTOLIC BLOOD PRESSURE: 118 MMHG | DIASTOLIC BLOOD PRESSURE: 78 MMHG | WEIGHT: 236 LBS | HEART RATE: 86 BPM | HEIGHT: 73 IN | TEMPERATURE: 99.4 F | BODY MASS INDEX: 31.28 KG/M2

## 2018-01-14 VITALS
WEIGHT: 238 LBS | TEMPERATURE: 98.8 F | RESPIRATION RATE: 16 BRPM | HEIGHT: 73 IN | DIASTOLIC BLOOD PRESSURE: 70 MMHG | BODY MASS INDEX: 31.54 KG/M2 | HEART RATE: 88 BPM | SYSTOLIC BLOOD PRESSURE: 122 MMHG

## 2018-01-14 VITALS
HEIGHT: 72 IN | TEMPERATURE: 98.7 F | HEART RATE: 84 BPM | RESPIRATION RATE: 18 BRPM | DIASTOLIC BLOOD PRESSURE: 78 MMHG | OXYGEN SATURATION: 96 % | BODY MASS INDEX: 30.89 KG/M2 | WEIGHT: 228.04 LBS | SYSTOLIC BLOOD PRESSURE: 120 MMHG

## 2018-01-14 VITALS
HEART RATE: 78 BPM | WEIGHT: 230.04 LBS | DIASTOLIC BLOOD PRESSURE: 74 MMHG | RESPIRATION RATE: 18 BRPM | SYSTOLIC BLOOD PRESSURE: 118 MMHG | OXYGEN SATURATION: 97 % | HEIGHT: 73 IN | BODY MASS INDEX: 30.49 KG/M2 | TEMPERATURE: 98.2 F

## 2018-01-14 VITALS
SYSTOLIC BLOOD PRESSURE: 122 MMHG | BODY MASS INDEX: 31.08 KG/M2 | DIASTOLIC BLOOD PRESSURE: 61 MMHG | HEIGHT: 73 IN | RESPIRATION RATE: 16 BRPM | HEART RATE: 86 BPM | WEIGHT: 234.5 LBS

## 2018-01-14 VITALS
SYSTOLIC BLOOD PRESSURE: 122 MMHG | DIASTOLIC BLOOD PRESSURE: 82 MMHG | WEIGHT: 228.05 LBS | HEIGHT: 72 IN | HEART RATE: 76 BPM | RESPIRATION RATE: 16 BRPM | BODY MASS INDEX: 30.89 KG/M2

## 2018-01-14 VITALS
RESPIRATION RATE: 16 BRPM | BODY MASS INDEX: 30.62 KG/M2 | WEIGHT: 226.05 LBS | HEIGHT: 72 IN | DIASTOLIC BLOOD PRESSURE: 82 MMHG | SYSTOLIC BLOOD PRESSURE: 112 MMHG | OXYGEN SATURATION: 98 % | HEART RATE: 80 BPM

## 2018-01-14 VITALS
HEIGHT: 72 IN | DIASTOLIC BLOOD PRESSURE: 64 MMHG | SYSTOLIC BLOOD PRESSURE: 118 MMHG | HEART RATE: 81 BPM | OXYGEN SATURATION: 96 % | TEMPERATURE: 97.6 F

## 2018-01-14 VITALS
HEIGHT: 73 IN | HEART RATE: 80 BPM | WEIGHT: 236 LBS | SYSTOLIC BLOOD PRESSURE: 125 MMHG | DIASTOLIC BLOOD PRESSURE: 82 MMHG | BODY MASS INDEX: 31.28 KG/M2
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
## 2018-01-15 NOTE — PROGRESS NOTES
Assessment    1  Encounter for preventive health examination (V70 0) (Z00 00)   2  Low testosterone (257 2) (E29 1)   3  Insomnia (780 52) (G47 00)   4  Fatigue (780 79) (R53 83)    Plan  BPH (benign prostatic hyperplasia), DM (diabetes mellitus screen), Fatigue, Low  testosterone    · (Q) CBC (INCLUDES DIFF/PLT) (REFL); Status:Active; Requested for:45Pvq4041;    · (Q) COMPREHENSIVE METABOLIC PNL W/ADJUSTED CALCIUM; Status:Active; Requested for:64Reo3477;    · (Q) LIPID PANEL WITH REFLEX TO DIRECT LDL; Status:Active; Requested  for:40Vxe7729;    · (Q) PSA, TOTAL WITH REFLEX TO PSA, FREE; Status:Active; Requested  for:95Txf5655;    · (Q) TESTOSTERONE, FREE AND TOTAL, LC/MS/MS; Status:Active; Requested  for:04Thi7817;   Daytime hypersomnolence, Fatigue    · *Polysomnography, Sleep Study, Diagnostic; Status:Need Information - Financial  Authorization; Requested for:87Xdz0106;   there any other medical conditions or medications that would explain these      symptoms? : No  is the sleep disturbance affecting the patient's ability to function? : fatigue, daytime      somnolence  History/Symptoms: : snoring, daytime fatigue, somnolence  Study Only or Consult : Sleep Study and Consult and F/U with Sleep Specialist  Insomnia    · Zolpidem Tartrate 10 MG Oral Tablet; take 1 tablet at  bedtime as needed for  insomnia    Discussion/Summary  Impression: health maintenance visit  Currently, he eats an adequate diet and has an adequate exercise regimen  Prostate cancer screening: prostate cancer screening is current and prostate cancer screening is managed by  Colorectal cancer screening: colorectal cancer screening is current, colonoscopy is needed every five years and the next colonoscopy is due 7980-6756  The immunizations are up to date  Patient is able to Self-Care  Possible side effects of new medications were reviewed with the patient/guardian today  The treatment plan was reviewed with the patient/guardian   The patient/guardian understands and agrees with the treatment plan   The patient was counseled regarding diagnostic results, impressions  Chief Complaint  Wellness      History of Present Illness  HM, Adult Male: The patient is being seen for a health maintenance evaluation  The last health maintenance visit was 1 year(s) ago  Social History: He is  but getting  this April  Work status: retired  The patient is a former cigarette smoker  He reports drinking 2-3 drinks per week  The patient has no concerns about alcohol abuse  He has never used illicit drugs  General Health: The patient's health since the last visit is described as good  He has regular dental visits  He complains of vision problems  Vision care includes wearing reading glasses and having regular eye examinations  He denies hearing loss  Hearing is normal  Immunizations status: up to date  Lifestyle:  He consumes a diverse and healthy diet  He does not have any weight concerns  He exercises regularly  He exercises 3 or more times per week  Exercise includes strength training and took up dancing recently  He does not use tobacco  The patient is a former cigarette smoker  He consumes alcohol  He reports drinking 2-3 drinks per week  He typically drinks beer and wine  Alcohol concern: The patient has no concerns about alcohol abuse  He denies drug use  Reproductive health:  the patient is sexually active  He complains of erectile dysfunction  Screening: cancer screening reviewed and current  metabolic screening reviewed and current  risk screening reviewed and current  Review of Systems    Constitutional: feeling tired and snoring , daytime fatigue , sleepiness, but no fever, no recent weight gain, no chills and no recent weight loss  Eyes: 6th nerve palsy, continues to see Dr Rosalynn Apley  Cardiovascular: no chest pain and no palpitations  Respiratory: no shortness of breath and no cough     Gastrointestinal: no abdominal pain, no constipation and no diarrhea  Genitourinary: nocturia  Musculoskeletal: occasional right hip pain with numbness on the anterior thigh which has improved  The patient presents with complaints of occasional episodes of mild dizziness  Psychiatric: insomnia, requesting Ambien which he has taken in the past prn  Active Problems    1  BPH (benign prostatic hyperplasia) (600 00) (N40 0)   2  DM (diabetes mellitus screen) (V77 1) (Z13 1)   3  Influenza vaccine needed (V04 81) (Z23)   4  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)   5  Numbness of right anterior thigh (782 0) (R20 8)   6  Right hip pain (719 45) (M25 551)   7  Screening, lipid (V77 91) (Z13 220)    Past Medical History    · History of Lightheadedness (780 4) (R42)   · History of Partial small bowel obstruction (560 9) (K56 69)   · History of Sixth cranial nerve palsy (378 54) (H49 20)    Surgical History    · History of Tonsillectomy    Family History  Family History    · Family history of diabetes mellitus (V18 0) (Z83 3)    Social History    · Former smoker (V15 82) (E06 085)    Current Meds   1  Aspirin 81 MG TABS; TAKE 1 TABLET DAILY; Therapy: 62Uaa4918 to Recorded   2  Cialis 5 MG Oral Tablet; TAKE AS DIRECTED; Therapy: 44Agn9070 to Recorded   3  Multiple Vitamins Oral Tablet; Take 1 tablet daily; Therapy: 71Xdv4265 to Recorded    Allergies    1  No Known Drug Allergies    2  Seasonal    Vitals   Recorded: 98EVY7938 10:43AM   Heart Rate 75   Systolic 509   Diastolic 66   Height 6 ft    Weight 233 lb 2 oz   BMI Calculated 31 62   BSA Calculated 2 27   O2 Saturation 98     Physical Exam    Constitutional   General appearance: No acute distress, well appearing and well nourished  Head and Face   Head and face: Normal     Eyes   Conjunctiva and lids: No erythema, swelling or discharge  Ears, Nose, Mouth, and Throat   Otoscopic examination: Tympanic membranes translucent with normal light reflex   Canals patent without erythema  Oropharynx: Normal with no erythema, edema, exudate or lesions  Neck   Neck: Supple, symmetric, trachea midline, no masses  Thyroid: Normal, no thyromegaly  Pulmonary   Respiratory effort: No increased work of breathing or signs of respiratory distress  Cardiovascular   Auscultation of heart: Normal rate and rhythm, normal S1 and S2, no murmurs  Abdomen   Abdomen: Non-tender, no masses  Liver and spleen: No hepatomegaly or splenomegaly  Lymphatic   Palpation of lymph nodes in neck: No lymphadenopathy  Musculoskeletal   Gait and station: Normal     Psychiatric   Orientation to person, place and time: Normal     Mood and affect: Normal        Results/Data  PHQ-2 Adult Depression Screening 51Acz0797 10:46AM User, Ahs     Test Name Result Flag Reference   PHQ-2 Adult Depression Score 0     Over the last two weeks, how often have you been bothered by any of the following problems? Little interest or pleasure in doing things: Not at all - 0  Feeling down, depressed, or hopeless: Not at all - 0   PHQ-2 Adult Depression Screening Negative         Future Appointments    Date/Time Provider Specialty Site   12/21/2017 10:30 AM JAZZMINE Galvez   [facility]     Signatures   Electronically signed by : JAZZMINE Bear ; Dec 19 2016 12:38PM EST                       (Author)

## 2018-01-17 NOTE — PROGRESS NOTES
Assessment    1  Chronic left shoulder pain (719 41,338 29) (M25 512,G89 29)   2  Cervical stenosis of spinal canal (723 0) (M48 02)   3  Cervical spondylosis with radiculopathy (721 0) (M47 22)   4  Tendinitis of left rotator cuff (726 10) (M75 82)    Plan    · 1 - Nilsa CM, Zeenat Short  (Orthopedic Surgery) Co-Management  Eval and treat  modalities as indicated  Status: Active  Requested for: 33WYD9370   Ordered; For: Chronic left shoulder pain, Tendinitis of left rotator cuff; Ordered By: Solange Cortes Performed:  Due: 32ZDZ7714; Last Updated By: Lawana Nyhan; 7/11/2017 10:47:56 AM  Care Summary provided  : Yes    · Follow-up PRN Evaluation and Treatment  Follow-up  Status: Complete  Done:  60LOF1833   Ordered; For: Tendinitis of left rotator cuff; Ordered By: Solange Cortes Performed:  Due: 11ERQ4049    Discussion/Summary    Very pleasant 81-year-old male, returns for follow-up, has had consultation with pain management (Dr Jeramy Lopez), and initiation of physical therapy  EMG 6/5/17 is also reviewed, and this reports no evidence of left cervical radiculopathy  Clinically the patient has findings suggestive of a left shoulder tendinitis, this may be associated with his cervical degeneration however favor shoulder etiology, particularly considering the fact he had a similar episode on the right  At this time consultation with orthopedic surgery for further assessment of his left shoulder is advised this has been requested with Dr Raza Forbes  In addition patient understands to continue to follow with pain management Dr Jeramy Lopez  Further follow-up with neurosurgery will be on an as needed basis  The patient does understand should in spite of several months (4-5) of therapy, injections, and orthopedic consultation not provide adequate resolution of his symptoms he is to return to neurosurgery for reassessment      These findings, impressions and recommendations are reviewed in great detail with the patient, he expressed understanding and agreement, his questions were answered completely and to his satisfaction  The patient was counseled regarding diagnostic results, instructions for management, patient and family education, importance of compliance with treatment  The patient has the current Goals: Improvement/resolution of left neck shoulder and arm pain  Patient is able to Self-Care  Chief Complaint    1  Neck Pain  Follow-up, shoulder pain, left neck pain, review pain management consultation  History of Present Illness  Very pleasant 60-year-old male, returns for follow-up as noted above  He has been to physical therapy and generally found this helpful although he reports recently he was treated with a traction device for about 30 minutes which he reports didn't aggravate his symptoms for a few days following the treatment  Principal complaint at this time are pain in the left shoulder, pain at the before meals joint, pain at the rotator cuff posteriorly, and pain radiating down his arm to his elbow  He has arcenio strictured range of motion to his shoulder secondary to pain  He also makes note of a history of a prior right rotator cuff tendinitis for which she did receive injections by orthopedics in [place], he found these definitely helpful  He has had consultation with pain management Dr Jeramy Lopez, who felt he may benefit from a RYLEE, but at this time the patient reported he was improving, and he elected to hold this in reserve at this time  He reports no other interval change in his medical or surgical history  Zahira Luis presents with complaints of sudden onset of constant episodes of mild left posterior neck pain, described as sharp, dull and burning, radiating to the left shoulder, left arm and left upper arm  On a scale of 1 to 10, the patient rates the pain as 3     Associated symptoms include neck stiffness, muscle spasm, impaired range of motion, shoulder pain, headache and tinnitus, but no tenderness, no impaired hearing, no impaired memory and no impaired vision  upper extremity weakness (left arm)  Review of Systems    Constitutional: feeling tired  Eyes: No complaints of eye pain, no red eyes, no discharge from eyes, no itchy eyes  ENT: no complaints of earache, no hearing loss, no nosebleeds, no nasal discharge, no sore throat, no hoarseness  Cardiovascular: No complaints of slow heart rate, no fast heart rate, no chest pain, no palpitations, no leg claudication, no lower extremity  Respiratory: No complaints of shortness of breath, no wheezing, no cough, no SOB on exertion, no orthopnea or PND  Gastrointestinal: No complaints of abdominal pain, no constipation, no nausea or vomiting, no diarrhea or bloody stools  Genitourinary: No complaints of dysuria, no incontinence, no hesitancy, no nocturia, no genital lesion, no testicular pain  Musculoskeletal: myalgias and joint stiffness  Integumentary: No complaints of skin rash or skin lesions, no itching, no skin wound, no dry skin  Neurological: tingling and left arm  Psychiatric: Is not suicidal, no sleep disturbances, no anxiety or depression, no change in personality, no emotional problems  Endocrine: No complaints of proptosis, no hot flashes, no muscle weakness, no erectile dysfunction, no deepening of the voice, no feelings of weakness  Hematologic/Lymphatic: No complaints of swollen glands, no swollen glands in the neck, does not bleed easily, no easy bruising  ROS reviewed  Active Problems    1  BPH (benign prostatic hyperplasia) (600 00) (N40 0)   2  Cervical disc herniation (722 0) (M50 20)   3  Cervical spondylosis with radiculopathy (721 0) (M47 22)   4  Cervical stenosis of spinal canal (723 0) (M48 02)   5  Daytime hypersomnolence (780 54) (G47 19)   6  DM (diabetes mellitus screen) (V77 1) (Z13 1)   7  Fatigue (780 79) (R53 83)   8   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 81)   9  Influenza vaccine needed (V04 81) (Z23)   10  Insomnia (780 52) (G47 00)   11  Left cervical radiculopathy (723 4) (M54 12)   12  Low testosterone (257 2) (E29 1)   13  Lumbar disc disease (722 93) (M51 9)   14  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)   15  Myofascial pain (729 1) (M79 1)   16  Numbness of right anterior thigh (782 0) (R20 8)   17  Right hip pain (719 45) (M25 551)   18  Screening, lipid (V77 91) (Z13 220)   19  Sixth nerve palsy, right (378 54) (H49 21)    Past Medical History    1  History of Anxiety (300 00) (F41 9)   2  History of Lightheadedness (780 4) (R42)   3  History of Partial small bowel obstruction (560 9) (K56 69)   4  History of Shoulder injury, left, initial encounter (959 2) (S49 92XA)   5  History of Sixth cranial nerve palsy (378 54) (H49 20)    The active problems and past medical history were reviewed and updated today  Surgical History    1  History of Sinus Surgery   2  History of Tonsillectomy    The surgical history was reviewed and updated today  Family History  Mother    1  Family history of Old age  Unknown    2  Family history of Back disorder  Family History    3  Family history of diabetes mellitus (V18 0) (Z83 3)    The family history was reviewed and updated today  Social History    · Former smoker (A54 54) (D46 818)   · Social alcohol use (Z78 9)  The social history was reviewed and updated today  Current Meds   1  Cialis 5 MG Oral Tablet; TAKE AS DIRECTED; Therapy: 42Cei2062 to Recorded   2  Ibuprofen 800 MG Oral Tablet; 1 po tid prn; Therapy: 70SYV3094 to (Evaluate:17Oct2017)  Requested for: 10IDQ6120; Last   Rx:19Jun2017 Ordered    Allergies    1  Codeine    2   Seasonal    Vitals  Vital Signs    Recorded: 39UUL4375 09:14AM   Temperature 97 8 F   Heart Rate 75   Respiration 16   Systolic 454   Diastolic 74   Height 6 ft 1 in   Weight 235 lb 12 8 oz   BMI Calculated 31 11   BSA Calculated 2 31     Physical Exam Constitutional Patient appears healthy and well developed  No signs of acute distress present  Respiratory Respiratory effort: Normal   Auscultation of lungs: Clear to auscultation bilaterally  Cardiovascular Auscultation of heart: Rate is regular  Rhythm is regular  No heart murmur appreciated  Musculo: Spine Contour is normal  No tenderness of the spine billaterally  (Left shoulder examination is as noted pain with palpation over the before meals joint as well as posterior capsule, range of motion restrictions are as noted)  Cervical Spine examination demonstrates no visible abnormailities, normal lordosis, no scoliosis, no spine tenderness on palpation, no masses, full ROM and no pain with motion in any direction  Motor Exam: all motor groups within normal limits of strength & tone bilaterally  Sensory Exam: all sensory within normal limits bilaterally  Deep Tendon Reflexes: all reflexes within normal limits bilaterally  Neurologic - Mental Status: Alert and Oriented x3  Mood and affect: Affect is normal   Grossly nonfocal    Motor System - Upper Extremities: Normal to inspection and palpation  Strength: Deltoids 5/5 bilaterally  Biceps 5/5 bilaterally  Triceps 5/5 bilaterally  Extensor carpi radials is 5/5 bilaterally  Extensor digitorum 5/5 bilaterally  Intrinsic 5/5 bilaterally   5/5 bilaterally  Left Shoulder, L Int Rot T10, R C7, Ext Rot: L 70 degree, R 70 degree, Flexion R 180, L 160 pain, tender to palpation over left post capsule  Reflexes: Biceps reflexes are 2+ bilaterally  Triceps reflexes are 2+ bilaterally  Achilles reflexes are 2+ bilaterally  Babinski's reflex is 2+ down going bilaterally  Ankle clonus is absent bilaterally  Coordination: Finger to nose was normal    Sensory: Sensation grossly intact to light touch  Sensation grossly intact to light touch  Gait and Station: [place] with a normal gait         Results/Data  Diagnostic Studies Reviewed Neuro[facility]: Results Review EMG/nerve conduction study, 6/5/17, impression  #1 borderline findings suggestive of chronic left elbow for radiculopathy without enervation  Clinical correlation recommended  #2 there is no evidence of peripheral neuropathy or myopathy in addition there was no electrophysiologic evidence of left cervical radiculopathy or left median neuropathy  Attending Note  Collaborating Physician: I interviewed and examined the patient, I discussed the case with the Advanced Practitioner and reviewed the note, I supervised the Advanced Practitioner and I agree with the Advanced Practitioner note  Agree with Advanced Practitioner Note Except: Imaging personally reviewed  Recommend RYLEE with Dr Antoinette Mcnair  Perhaps left shoulder workup, evaluation, management with orthopedics, Dr Corbett Mt  Not convincing for cervical radiculopathy  Future Appointments    Date/Time Provider Specialty Site   07/24/2017 09:50 Lacinda Denver, M D  Orthopedic Surgery [address]   08/24/2017 12:00 PM Yasmin Anderson MD Neurology [facility]156   12/21/2017 10:30 AM JAZZMINE Gusman   Internal Medicine [facility]   07/31/2017 01:15 PM Jonathan Frederick,  Pain Management [address]     Signatures   Electronically signed by : Melissa Guillermo, [facility]; Jul 11 2017 11:08AM EST                       (Author)    Electronically signed by : Gale Espinosa MD PhD; Jul 14 2017  1:00PM EST                       (Co-participant)

## 2018-01-18 NOTE — MISCELLANEOUS
Assessment    1  Partial small bowel obstruction (560 9) (K56 69)    Plan  DM (diabetes mellitus screen), Screening, lipid    · (1) LIPID PANEL, FASTING; Status:Active; Requested for:14Apr2016;    Perform:[facility]; Due:14Apr2017;Ordered; For:DM (diabetes mellitus screen), Screening, lipid; Ordered By:Reyes, Lea;   · (1) BASIC METABOLIC PROFILE; Status:Active; Requested for:14Apr2016;    Perform:[facility]; Due:14Apr2017;Ordered; For:DM (diabetes mellitus screen), Screening, lipid; Ordered By:Reyes, Lea;    Discussion/Summary  Discussion Summary:   Increase oral fluids  High fiber diet  Chew food very well  Advance diet as tolerated  December for yearly with labs  Counseling Documentation With Imm: The patient was counseled regarding diagnostic results, instructions for management, impressions  total time of encounter was 30 minutes and >15 minutes was spent counseling  Medication SE Review and Pt Understands Tx: Possible side effects of new medications were reviewed with the patient/guardian today  The treatment plan was reviewed with the patient/guardian  The patient/guardian understands and agrees with the treatment plan      Chief Complaint  Chief Complaint Free Text Note Form: TCM      History of Present Illness  TCM Communication [facility]: The patient is being contacted for follow-up after hospitalization  Hospital records were reviewed  [facility]  The date of admission: 04/05/2016, date of discharge: 04/07/2016  Diagnosis: Partial small bowel obstruction  He was discharged to home  Medications were not reviewed today  He scheduled a follow up appointment  Symptoms: headache and lower abdominal pain  General malaise Counseling was provided to the patient     Communication performed and completed by Yadira Kaur   HPI: Admitted foe severe abd pain  High-grade mid small bowel obstruction at the site of an angulated loop in the posterior mid abdomen seen on CT  Did not need an NGT  He ate popcorn the night before, half a small bag of carrots the following evening then wings the same night  As he was eating wings, he developed worsening lower sharp abd pain and went to the hospital  f/u with surgery 4/19  no h/o abd surgeries  currently with mild lower abd discomfort, advancing diet as tolerated but says he is paranoid with food  had a big BM in the hospital, regular BMs since then  last colonoscopy in 2013      Review of Systems  Complete-Male:   Constitutional: no fever and no chills  Gastrointestinal: as noted in HPI  Genitourinary: nocturia, but no dysuria and no incontinence  Active Problems    1  BPH (benign prostatic hyperplasia) (600 00) (N40 0)   2  DM (diabetes mellitus screen) (V77 1) (Z13 1)   3  Influenza vaccine needed (V04 81) (Z23)   4  Lightheadedness (780 4) (R42)   5  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)   6  Screening, lipid (V77 91) (Z13 220)    Past Medical History    1  History of Sixth cranial nerve palsy (378 54) (H49 20)    Surgical History    1  History of Tonsillectomy  Surgical History Reviewed: The surgical history was reviewed and updated today  Family History    1  Family history of diabetes mellitus (V18 0) (Z83 3)  Family History Reviewed: The family history was reviewed and updated today  Social History    · Former smoker (Q70 90) (P85 412)  Social History Reviewed: The social history was reviewed and updated today  Current Meds   1  Aspirin 81 MG Oral Tablet; TAKE 1 TABLET DAILY; Therapy: 60Vbk7738 to Recorded   2  Cialis 5 MG Oral Tablet; TAKE AS DIRECTED; Therapy: 93Jjz2642 to Recorded   3  Multiple Vitamins Oral Tablet; Take 1 tablet daily; Therapy: 80Aws4788 to Recorded  Medication List Reviewed: The medication list was reviewed and updated today  Allergies    1  No Known Drug Allergies    2   Seasonal    Physical Exam    Constitutional   General appearance: No acute distress, well appearing and well nourished  Pulmonary   Respiratory effort: No increased work of breathing or signs of respiratory distress  Auscultation of lungs: Clear to auscultation, equal breath sounds bilaterally, no wheezes, no rales, no rhonci  Cardiovascular   Auscultation of heart: Normal rate and rhythm, normal S1 and S2, without murmurs  Abdomen   Abdomen: Abnormal   (lower abd tenderness)   Liver and spleen: No hepatomegaly or splenomegaly  Future Appointments    Date/Time Provider Specialty Site   12/19/2016 10:30 AM JAZZMINE Mathews   Internal Medicine [facility]     Signatures   Electronically signed by : Kayy Delgado, ; Apr 8 2016 12:44PM EST                       (Author)    Electronically signed by : JAZZMINE Chavira ; Apr 14 2016 12:27PM EST                       (Author)

## 2018-01-22 VITALS
SYSTOLIC BLOOD PRESSURE: 127 MMHG | RESPIRATION RATE: 16 BRPM | DIASTOLIC BLOOD PRESSURE: 71 MMHG | HEART RATE: 84 BPM | HEIGHT: 73 IN | BODY MASS INDEX: 30.09 KG/M2 | TEMPERATURE: 97.7 F | WEIGHT: 227 LBS

## 2018-01-22 VITALS
BODY MASS INDEX: 31.25 KG/M2 | DIASTOLIC BLOOD PRESSURE: 74 MMHG | TEMPERATURE: 97.8 F | SYSTOLIC BLOOD PRESSURE: 123 MMHG | HEART RATE: 75 BPM | WEIGHT: 235.8 LBS | RESPIRATION RATE: 16 BRPM | HEIGHT: 73 IN

## 2018-01-22 VITALS
BODY MASS INDEX: 32.23 KG/M2 | DIASTOLIC BLOOD PRESSURE: 72 MMHG | HEIGHT: 72 IN | WEIGHT: 238 LBS | SYSTOLIC BLOOD PRESSURE: 112 MMHG

## 2018-01-23 VITALS
HEART RATE: 89 BPM | DIASTOLIC BLOOD PRESSURE: 81 MMHG | SYSTOLIC BLOOD PRESSURE: 139 MMHG | BODY MASS INDEX: 31.94 KG/M2 | TEMPERATURE: 98.2 F | WEIGHT: 241 LBS | HEIGHT: 73 IN

## 2018-01-23 VITALS
BODY MASS INDEX: 32.37 KG/M2 | RESPIRATION RATE: 16 BRPM | WEIGHT: 239 LBS | TEMPERATURE: 98.6 F | HEART RATE: 88 BPM | DIASTOLIC BLOOD PRESSURE: 80 MMHG | HEIGHT: 72 IN | OXYGEN SATURATION: 97 % | SYSTOLIC BLOOD PRESSURE: 118 MMHG

## 2018-01-23 NOTE — PROGRESS NOTES
Assessment    1  Encounter for preventive health examination (V70 0) (Z00 00)   2  Hyperlipidemia (272 4) (E78 5)   3  Colon cancer screening (V76 51) (Z12 11)   4  Need for hepatitis C screening test (V73 89) (Z11 59)    Plan  Hyperlipidemia    · (Q) LIPID PANEL WITH REFLEX TO DIRECT LDL; Status:Active; Requested  ZJE:08WDM1082;   Need for hepatitis C screening test    · (Q) HEPATITIS C ANTIBODY; Status:Active; Requested MVI:80OWW7018; Discussion/Summary  health maintenance visit eats an adequate diet Currently, he has an adequate exercise regimen  Prostate cancer screening: PSA was ordered  Colorectal cancer screening: colorectal cancer screening is current and the next colonoscopy is due 2018  The immunizations are up to date  Advice and education were given regarding weight loss  The patient agrees to Hepatitis C screening  The patient was counseled regarding diagnostic results, impressions  Possible side effects of new medications were reviewed with the patient/guardian today  The treatment plan was reviewed with the patient/guardian  The patient/guardian understands and agrees with the treatment plan      Chief Complaint  The patient presents today for annual wellness exam      History of Present Illness  HM, Adult Male: The patient is being seen for a health maintenance evaluation  The last health maintenance visit was year(s) ago  Social History: Household members include spouse  He is   Work status: working full time  General Health: The patient's health since the last visit is described as good  He has regular dental visits  The patient brushes 1 time(s) a day, reports his last dental visit was 12/2017 and dental water pik  Dental problems: no tooth pain and no caries  He complains of vision problems  Vision care includes wearing reading glasses and previous LASIK eye surgery  He denies hearing loss  Immunizations status: up to date  Lifestyle:  He does not have a healthy diet   He has weight concerns  Weight control issues: overweight  He exercises regularly  He exercises 1-3 times per week for 45 minutes per session  Exercise includes dansocorro ferreiraptical  He does not use tobacco  The patient is a former cigarette smoker  He consumes alcohol  He reports occasional alcohol use  He typically drinks beer and wine  Alcohol concern: The patient has no concerns about alcohol abuse  He denies drug use  Reproductive health:  the patient is sexually active  birth control is not being practiced  He denies erectile dysfunction  Screening: cancer screening reviewed and current  Colorectal cancer screening includes last colonoscopy done 2013  metabolic screening reviewed and current  risk screening reviewed and current  Review of Systems    Constitutional: recent weight gain, but no fever and no chills  Cardiovascular: No complaints of slow heart rate, no fast heart rate, no chest pain, no palpitations, no leg claudication, no lower extremity, no chest pain and no palpitations  Respiratory: cough, but no shortness of breath, no orthopnea, no shortness of breath during exertion and no PND  Gastrointestinal: diarrhea, but no abdominal pain, no nausea, no vomiting, no constipation and no blood in stools  Genitourinary: dysuria, urinary hesitancy and slow stream, but no genital lesions, no incontinence, no nocturia and no testicular pain  Musculoskeletal: neck pain better with PT, on diclofenac and gabapentin  Active Problems    1  BPH (benign prostatic hyperplasia) (600 00) (N40 0)   2  Cervical disc herniation (722 0) (M50 20)   3  Cervical spondylosis with radiculopathy (721 0) (M47 22)   4  Cervical stenosis of spinal canal (723 0) (M48 02)   5  Chronic left shoulder pain (719 41,338 29) (M25 512,G89 29)   6  Daytime hypersomnolence (780 54) (G47 19)   7  DM (diabetes mellitus screen) (V77 1) (Z13 1)   8  Fatigue (780 79) (R53 83)   9   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 81)   10  Influenza vaccine needed (V04 81) (Z23)   11  Insomnia (780 52) (G47 00)   12  Left cervical radiculopathy (723 4) (M54 12)   13  Low testosterone (259 9) (E34 9)   14  Lumbar disc disease (722 93) (M51 9)   15  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)   16  Myofascial pain (729 1) (M79 1)   17  Need for influenza vaccination (V04 81) (Z23)   18  Numbness of right anterior thigh (782 0) (R20 8)   19  Right hip pain (719 45) (M25 551)   20  Screening, lipid (V77 91) (Z13 220)   21  Sixth nerve palsy, right (378 54) (H49 21)   22  Tendinitis of left rotator cuff (726 10) (M75 82)    Past Medical History    · History of Anxiety (300 00) (F41 9)   · History of Lightheadedness (780 4) (R42)   · History of Partial small bowel obstruction (560 9) (K56 600)   · History of Shoulder injury, left, initial encounter (959 2) (E62 71EV)   · History of Sixth cranial nerve palsy (378 54) (H49 20)    Surgical History    · History of Sinus Surgery   · History of Tonsillectomy    Family History  Mother    · Family history of Old age  Unknown    · Family history of Back disorder  Family History    · Family history of diabetes mellitus (V18 0) (Z83 3)    Social History    · Daily caffeine consumption   · Former smoker (V15 82) (G37 512)   · Social alcohol use (Z78 9)    Current Meds   1  Cialis 5 MG Oral Tablet; 1 tablet daily for symptoms of BPH; Therapy: 19Bcc7058 to (Evaluate:18Mar2018)  Requested for: 05UVZ1218; Last   Rx:78Ebz6869 Ordered   2  Diclofenac Potassium 50 MG Oral Tablet; One tablet twice daily with food for arthritic pain; Therapy: 89WVK7678 to (Last Rx:00Gnv6859)  Requested for: 55TWJ7198 Ordered   3  Gabapentin 300 MG Oral Capsule; TAKE 1 CAPSULE AT BEDTIME NIGHTLY; Therapy: 97NTM4319 to (Renee Mcginnis)  Requested for: 11HBI5859; Last   Rx:81Zfn3325 Ordered    Allergies    1  Codeine    2   Seasonal    Vitals   Recorded: 21Dec2017 10:46AM   Temperature 98 6 F, Oral   Heart Rate 88 Respiration 16   Systolic 806, Sitting   Diastolic 80, Sitting   Height 6 ft    Weight 239 lb    BMI Calculated 32 41   BSA Calculated 2 3   O2 Saturation 97     Physical Exam    Constitutional   General appearance: No acute distress, well appearing and well nourished  Head and Face   Head and face: Normal     Eyes   Conjunctiva and lids: No erythema, swelling or discharge  Ears, Nose, Mouth, and Throat   Otoscopic examination: Tympanic membranes translucent with normal light reflex  Canals patent without erythema  Oropharynx: Normal with no erythema, edema, exudate or lesions  Neck   Neck: Supple, symmetric, trachea midline, no masses  Thyroid: Normal, no thyromegaly  Pulmonary   Respiratory effort: No increased work of breathing or signs of respiratory distress  Auscultation of lungs: Clear to auscultation  Cardiovascular   Auscultation of heart: Normal rate and rhythm, normal S1 and S2, no murmurs  Abdomen   Abdomen: Non-tender, no masses  Liver and spleen: No hepatomegaly or splenomegaly  Lymphatic   Palpation of lymph nodes in neck: No lymphadenopathy      Musculoskeletal   Gait and station: Normal     Psychiatric   Orientation to person, place and time: Normal     Mood and affect: Normal        Future Appointments    Date/Time Provider Specialty Site   08/28/2018 11:30 AM Bob Piper MD Neurology [address]   11/20/2018 11:00 AM Nathalie Eisenberg MD Urology [address]   05/18/2018 01:00 PM Gini Le DO Pain Management [facility] 15     Signatures   Electronically signed by : JAZZMINE Ham ; Dec 21 2017 11:11AM EST                       (Author)

## 2018-02-28 ENCOUNTER — TELEPHONE (OUTPATIENT)
Dept: OBGYN CLINIC | Facility: HOSPITAL | Age: 63
End: 2018-02-28

## 2018-02-28 DIAGNOSIS — M54.12 CERVICAL RADICULOPATHY: Primary | ICD-10-CM

## 2018-02-28 RX ORDER — GABAPENTIN 300 MG/1
300 CAPSULE ORAL
Qty: 30 CAPSULE | Refills: 11 | Status: SHIPPED | OUTPATIENT
Start: 2018-02-28 | End: 2018-06-05 | Stop reason: SDUPTHER

## 2018-02-28 RX ORDER — GABAPENTIN 300 MG/1
300 CAPSULE ORAL
Qty: 30 CAPSULE | Refills: 2 | Status: SHIPPED | OUTPATIENT
Start: 2018-02-28 | End: 2018-02-28 | Stop reason: SDUPTHER

## 2018-02-28 NOTE — TELEPHONE ENCOUNTER
Looks like per [facility], Gabapentin 300mg at HS  last ordered on 12/1/17 c/ 2 refills  JW to advise  Thanks

## 2018-02-28 NOTE — TELEPHONE ENCOUNTER
Caller: patient  Call back number: [phone]    Patient called asking for a refill of gabapentin 300mg  Patient takes 1 pill at night  He uses [facility]  Patient states he has no pills left

## 2018-02-28 NOTE — TELEPHONE ENCOUNTER
Eleven refills put on medication for now  The patient's primary care physician can take over this medication since it is non narcotic and he is stable on it

## 2018-02-28 NOTE — TELEPHONE ENCOUNTER
S/w the patient and he stated he did not know why he has to call in for a refill of the gabapentin and he does not know why he cannot have a years worth at a time and that there was no reason as to why this cannot be so  Reiterated office and physician policy  JW to advise   Thanks

## 2018-03-01 NOTE — TELEPHONE ENCOUNTER
RN attempted to reach pt regarding previous message  A detailed VMMLOM regarding same with c/b number office hours and location provided if pt should have any further questions or concerns

## 2018-04-05 ENCOUNTER — TELEPHONE (OUTPATIENT)
Dept: INTERNAL MEDICINE CLINIC | Facility: CLINIC | Age: 63
End: 2018-04-05

## 2018-04-05 DIAGNOSIS — F41.9 ANXIETY: Primary | ICD-10-CM

## 2018-04-05 RX ORDER — ALPRAZOLAM 1 MG/1
TABLET ORAL
Qty: 2 TABLET | Refills: 0 | Status: SHIPPED | OUTPATIENT
Start: 2018-04-05 | End: 2019-01-18 | Stop reason: SDUPTHER
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
## 2018-05-16 ENCOUNTER — TELEPHONE (OUTPATIENT)
Dept: PAIN MEDICINE | Facility: CLINIC | Age: 63
End: 2018-05-16

## 2018-05-16 NOTE — TELEPHONE ENCOUNTER
Spoke with patient, he was aware of previous message left by SPA regarding 5/18/18 scheduled appointmernDr Liborio Aase will not be in the office at time of scheduled appointment    Patient will call back to reschedule appointment

## 2018-05-30 DIAGNOSIS — M54.12 CERVICAL RADICULOPATHY: ICD-10-CM

## 2018-06-04 RX ORDER — GABAPENTIN 300 MG/1
CAPSULE ORAL
Qty: 30 CAPSULE | Refills: 2 | OUTPATIENT
Start: 2018-06-04

## 2018-06-05 RX ORDER — GABAPENTIN 300 MG/1
300 CAPSULE ORAL
Qty: 30 CAPSULE | Refills: 11 | Status: SHIPPED | OUTPATIENT
Start: 2018-06-05 | End: 2020-01-21

## 2018-06-05 NOTE — TELEPHONE ENCOUNTER
S/W patient, states he does need a refill on his Gabapentin  Advised patient that we do not accept prescription request from pharmacies  Pt verbalized understanding  Pt was last seen in office on 5/18/18

## 2018-07-18 ENCOUNTER — TELEPHONE (OUTPATIENT)
Dept: NEUROLOGY | Facility: CLINIC | Age: 63
End: 2018-07-18

## 2018-08-28 ENCOUNTER — OFFICE VISIT (OUTPATIENT)
Dept: NEUROLOGY | Facility: CLINIC | Age: 63
End: 2018-08-28
Payer: COMMERCIAL

## 2018-08-28 VITALS
HEIGHT: 73 IN | SYSTOLIC BLOOD PRESSURE: 124 MMHG | WEIGHT: 228.6 LBS | DIASTOLIC BLOOD PRESSURE: 76 MMHG | BODY MASS INDEX: 30.3 KG/M2 | HEART RATE: 78 BPM

## 2018-08-28 DIAGNOSIS — M47.812 SPONDYLOSIS OF CERVICAL REGION WITHOUT MYELOPATHY OR RADICULOPATHY: ICD-10-CM

## 2018-08-28 DIAGNOSIS — G89.29 CHRONIC LEFT-SIDED LOW BACK PAIN, WITH SCIATICA PRESENCE UNSPECIFIED: Primary | ICD-10-CM

## 2018-08-28 DIAGNOSIS — M54.5 CHRONIC LEFT-SIDED LOW BACK PAIN, WITH SCIATICA PRESENCE UNSPECIFIED: Primary | ICD-10-CM

## 2018-08-28 PROBLEM — M54.50 CHRONIC LEFT-SIDED LOW BACK PAIN: Status: ACTIVE | Noted: 2018-08-28

## 2018-08-28 PROCEDURE — 99215 OFFICE O/P EST HI 40 MIN: CPT | Performed by: PSYCHIATRY & NEUROLOGY

## 2018-08-28 NOTE — PROGRESS NOTES
Patient ID: Jacobo Mcguire is a 59 y o  male  Assessment/Plan:    Spondylosis of cervical region without myelopathy or radiculopathy  Mr Blayne Carrillo has neck pain, and had radicular symptoms in the left upper extremity in the past   This has improved significantly since he has been doing physical therapy and exercises on a regular basis  He already has been evaluated by Neurosurgery and no surgery was recommended, and has been through pain management, no injections were done  At this time, his exam is stable and we will continue to monitor him clinically  His pain is stable on gabapentin  Chronic left-sided low back pain  He reports worsening lower back pain, radiating mainly to the left lower extremity and I did appreciate very mild weakness in the hamstrings  I recommended physical therapy, and will proceed with electrodiagnostic studies for the lower extremities to rule out any radiculopathy  If his pain gets worse despite the therapy or if the electrodiagnostic studies show significant changes, we will consider repeat MRI of the lumbar spine  Diagnoses and all orders for this visit:    Chronic left-sided low back pain, with sciatica presence unspecified  -     Ambulatory referral to Physical Therapy; Future  -     EMG 2 limb lower extremity; Future    Spondylosis of cervical region without myelopathy or radiculopathy       My assessment and plan was discussed in detail with the patient, he and he is in agreement  He will contact me if his symptoms worsen, otherwise I will see him back in 6 months  I will certainly stay in touch with him after I get the results of his EMG  Thank you very much for allowing me to participate in his care  Subjective:    HPI  I had the pleasure of seeing your patient for neuromuscular consultation  As you know, he/she is a 59 y o  male for neuropathy/ muscle weakness/ pain/ gait problems  Please allow me to summarize the history for the record       He has had neck pain for year and half ago, was seen in our clinic by Dr Pamela Ruiz a year ago  He had radiating pain to the left upper extremity at the time  He had electrodiagnostic studies of his left upper and lower extremities by Dr Pamlea Ruiz, left upper extremity was completely normal, and lower extremity showed some chronic neurogenic changes in the L4 innervated muscles, questioning a chronic mild L4 radiculopathy  He had MRI of the lumbar spine which only showed mild spondylosis without any significant neuroforaminal narrowing  MRI of the cervical spine was done, and I was personally reviewed by me as a part of this evaluation and showed degenerative changes at multiple levels, worst at C3-4 on the right side causing severe right foraminal narrowing and C4-5 causing severe bilateral neuroforaminal narrowing, C5-6 on the left causing severe neuroforaminal narrowing  There was no significant canal stenosis  He was evaluated by a neurosurgeon, was not recommended surgery  On gabapentin 300 mg at bedtime  He has been through physical therapy, has been doing exercises on a regular basis, feels the paresthesias are improved  He went to pain management, did not have any injections  Does report weakness in the left leg, tends to trip easy  Has lower back pain, which has gotten worse, gets muscle spasms in the back and his lower extremities  Has pain in both hips, reports that responds to the pain medications, does not take it on a daily basis  No paresthesias in feet  No change in bowel frequency, has BPH, on medications  The following portions of the patient's history were reviewed and updated as appropriate: allergies, current medications, past family history, past medical history, past social history, past surgical history and problem list       Objective:    Blood pressure 124/76, pulse 78, height 6' 1" (1 854 m), weight 104 kg (228 lb 9 6 oz)      Physical Exam  General exam: Pt was awake, alert and oriented  HEENT: atraumatic, normocephalic  Normal oral mucosa, neck was supple, no lymphadenopathy  Normal peripheral pulses, heart sounds were normal  Chest was clear  Extremities did not show any edema or cyanosis  Neurological Exam  Neurologically, pt was awake and alert  Speech was normal, no dysarthria or aphasia  Cranial nerve exam showed normal extraocular movements, no nystagmus or diplopia  There was no ptosis at baseline or with sustained upward gaze  Strength of eye closure muscles was normal   Facial sensations were normal bilaterally  No facial weakness, able to blow out the cheeks and push the tongue in the cheeks well  No tongue atrophy or fasciculations  Motor exam revealed normal tone and muscle bulk  There was no atrophy, scapular winging,high arches, hammertoes, shortening of achilles tendons or any other features of neuromuscular disease  Muscle strength was normal in neck flexors and extensors, and all muscle groups in both upper and lower extremities, except left hamstrings which were graded as 4+/5  Reflexes were graded as 2+ throughout  Toes were downgoing  There was no exaggerated jaw jerk or sandoval's sign  No ankle clonus  Sensory examination revealed patchy loss of pinprick and temperature in his left lower extremity, questioning L4 or S1 distribution, vibration and proprioception were normal  Gait was normal and casual        ROS:    Review of Systems   Constitutional: Negative for appetite change and fever  HENT: Positive for tinnitus  Negative for hearing loss, trouble swallowing and voice change  Eyes: Negative for photophobia and pain  Eye pressure    Respiratory: Negative  Negative for shortness of breath  Cardiovascular: Negative  Negative for palpitations  Gastrointestinal: Negative  Negative for nausea  Endocrine: Negative  Negative for cold intolerance and heat intolerance  Genitourinary: Positive for frequency and urgency  Negative for dysuria  Musculoskeletal: Positive for arthralgias, back pain and neck pain  Negative for myalgias  Muscle spasms   Skin: Negative  Allergic/Immunologic: Negative  Neurological: Positive for weakness (left leg) and headaches  Negative for tremors, seizures, syncope, facial asymmetry, speech difficulty and numbness  Hematological: Negative  Does not bruise/bleed easily  Psychiatric/Behavioral: Positive for sleep disturbance  Negative for confusion and hallucinations

## 2018-08-28 NOTE — ASSESSMENT & PLAN NOTE
He reports worsening lower back pain, radiating mainly to the left lower extremity and I did appreciate very mild weakness in the hamstrings  I recommended physical therapy, and will proceed with electrodiagnostic studies for the lower extremities to rule out any radiculopathy  If his pain gets worse despite the therapy or if the electrodiagnostic studies show significant changes, we will consider repeat MRI of the lumbar spine

## 2018-08-28 NOTE — ASSESSMENT & PLAN NOTE
Mr Colby Tobin has neck pain, and had radicular symptoms in the left upper extremity in the past   This has improved significantly since he has been doing physical therapy and exercises on a regular basis  He already has been evaluated by Neurosurgery and no surgery was recommended, and has been through pain management, no injections were done  At this time, his exam is stable and we will continue to monitor him clinically  His pain is stable on gabapentin

## 2018-08-28 NOTE — PROGRESS NOTES
MEDICAL STUDENT  Inpatient Progress Note for TRAINING ONLY  Not Part of Legal Medical Record       Progress Note - Piotr Solid 59 y o  male MRN: [id number]    Unit/Bed#:  Encounter: [id number]      Assessment/Plan:        Subjective:     Patient is a 52-year-old gentleman coming in for follow-up in regards to pain in the neck and back and left arm pain  As of 8/24/17, patient states that he's been feeling much better  Patient was evaluated by neurosurgery and no surgery was recommended  Patient was sent to pain management and was referred to orthopedic surgery for his shoulder  He went to PT for neck and it helped him  His pain is significantly better  CTA head on 6/6/17 was unremarkable and no intracranial pathology noted  EMG of left upper and lower extremity on 6/5/17 showed borderline findings suggestive of chronic left L4 radiculopathy without denervation and clinical correlation was recommended  There was no evidence of peripheral neuropathy or myopathy or left cervical radiculopathy or median neuropathy at the wrist  MRI C-spine on 5/18/17 showed severe right foraminal narrowing at C3-4 level due to uncovertebral spurring, broad-based ridging with disc and osteophyte complex at C4-5 level with uncovertebral spurring with the mild to moderate central canal narrowing and severe right and severe left foraminal narrowing with a right foraminal narrowing more pronounced than on the left side  Severe left foraminal narrowing at C5-6 level due to uncovertebral spurring with impingement of the left C6 nerve root sleeve and mild central canal narrowing    Currently,     Most Labs were reviewed       Objective:     Vitals: There were no vitals taken for this visit  ,There is no height or weight on file to calculate BMI      [unfilled]    Physical Exam: {Exam, Complete:05197}     Invasive Devices          No matching active lines, drains, or airways          Lab, Imaging and other studies: {Results Review Statement:04054}  VTE Pharmacologic Prophylaxis: {Pharmacologic VTE Prophylaxis:810413324}  VTE Mechanical Prophylaxis: {Mechanical VTE Prophylaxis:84529}

## 2018-08-28 NOTE — LETTER
August 30, 2018     Deandra Gillette, [address] [facility]  [address] [facility] [address]    Patient: Jamilah Vazquez   YOB: 1955   Date of Visit: 8/28/2018       Dear Dr Linnette Baron: Thank you for referring Rachael Anderson to me for evaluation  Below are my notes for this consultation  If you have questions, please do not hesitate to call me  I look forward to following your patient along with you  Sincerely,        Isidro Owen MD        CC: No Recipients  Isidro Owen MD  8/28/2018 12:32 PM  Sign at close encounter  Patient ID: Jamilah Vazquez is a 59 y o  male  Assessment/Plan:    Spondylosis of cervical region without myelopathy or radiculopathy  Mr Bakari Avelar has neck pain, and had radicular symptoms in the left upper extremity in the past   This has improved significantly since he has been doing physical therapy and exercises on a regular basis  He already has been evaluated by Neurosurgery and no surgery was recommended, and has been through pain management, no injections were done  At this time, his exam is stable and we will continue to monitor him clinically  His pain is stable on gabapentin  Chronic left-sided low back pain  He reports worsening lower back pain, radiating mainly to the left lower extremity and I did appreciate very mild weakness in the hamstrings  I recommended physical therapy, and will proceed with electrodiagnostic studies for the lower extremities to rule out any radiculopathy  If his pain gets worse despite the therapy or if the electrodiagnostic studies show significant changes, we will consider repeat MRI of the lumbar spine  Diagnoses and all orders for this visit:    Chronic left-sided low back pain, with sciatica presence unspecified  -     Ambulatory referral to Physical Therapy; Future  -     EMG 2 limb lower extremity;  Future    Spondylosis of cervical region without myelopathy or radiculopathy       My assessment and plan was discussed in detail with the patient, he and he is in agreement  He will contact me if his symptoms worsen, otherwise I will see him back in 6 months  I will certainly stay in touch with him after I get the results of his EMG  Thank you very much for allowing me to participate in his care  Subjective:    HPI  I had the pleasure of seeing your patient for neuromuscular consultation  As you know, he/she is a 59 y o  male for neuropathy/ muscle weakness/ pain/ gait problems  Please allow me to summarize the history for the record  He has had neck pain for year and half ago, was seen in our clinic by Dr Jose Alonzo a year ago  He had radiating pain to the left upper extremity at the time  He had electrodiagnostic studies of his left upper and lower extremities by Dr Jose Alonzo, left upper extremity was completely normal, and lower extremity showed some chronic neurogenic changes in the L4 innervated muscles, questioning a chronic mild L4 radiculopathy  He had MRI of the lumbar spine which only showed mild spondylosis without any significant neuroforaminal narrowing  MRI of the cervical spine was done, and I was personally reviewed by me as a part of this evaluation and showed degenerative changes at multiple levels, worst at C3-4 on the right side causing severe right foraminal narrowing and C4-5 causing severe bilateral neuroforaminal narrowing, C5-6 on the left causing severe neuroforaminal narrowing  There was no significant canal stenosis  He was evaluated by a neurosurgeon, was not recommended surgery  On gabapentin 300 mg at bedtime  He has been through physical therapy, has been doing exercises on a regular basis, feels the paresthesias are improved  He went to pain management, did not have any injections  Does report weakness in the left leg, tends to trip easy  Has lower back pain, which has gotten worse, gets muscle spasms in the back and his lower extremities   Has pain in both hips, reports that responds to the pain medications, does not take it on a daily basis  No paresthesias in feet  No change in bowel frequency, has BPH, on medications  The following portions of the patient's history were reviewed and updated as appropriate: allergies, current medications, past family history, past medical history, past social history, past surgical history and problem list       Objective:    Blood pressure 124/76, pulse 78, height 6' 1" (1 854 m), weight 104 kg (228 lb 9 6 oz)  Physical Exam  General exam: Pt was awake, alert and oriented  HEENT: atraumatic, normocephalic  Normal oral mucosa, neck was supple, no lymphadenopathy  Normal peripheral pulses, heart sounds were normal  Chest was clear  Extremities did not show any edema or cyanosis  Neurological Exam  Neurologically, pt was awake and alert  Speech was normal, no dysarthria or aphasia  Cranial nerve exam showed normal extraocular movements, no nystagmus or diplopia  There was no ptosis at baseline or with sustained upward gaze  Strength of eye closure muscles was normal   Facial sensations were normal bilaterally  No facial weakness, able to blow out the cheeks and push the tongue in the cheeks well  No tongue atrophy or fasciculations  Motor exam revealed normal tone and muscle bulk  There was no atrophy, scapular winging,high arches, hammertoes, shortening of achilles tendons or any other features of neuromuscular disease  Muscle strength was normal in neck flexors and extensors, and all muscle groups in both upper and lower extremities, except left hamstrings which were graded as 4+/5  Reflexes were graded as 2+ throughout  Toes were downgoing  There was no exaggerated jaw jerk or sandoval's sign  No ankle clonus    Sensory examination revealed patchy loss of pinprick and temperature in his left lower extremity, questioning L4 or S1 distribution, vibration and proprioception were normal  Gait was normal and casual        ROS:    Review of Systems   Constitutional: Negative for appetite change and fever  HENT: Positive for tinnitus  Negative for hearing loss, trouble swallowing and voice change  Eyes: Negative for photophobia and pain  Eye pressure    Respiratory: Negative  Negative for shortness of breath  Cardiovascular: Negative  Negative for palpitations  Gastrointestinal: Negative  Negative for nausea  Endocrine: Negative  Negative for cold intolerance and heat intolerance  Genitourinary: Positive for frequency and urgency  Negative for dysuria  Musculoskeletal: Positive for arthralgias, back pain and neck pain  Negative for myalgias  Muscle spasms   Skin: Negative  Allergic/Immunologic: Negative  Neurological: Positive for weakness (left leg) and headaches  Negative for tremors, seizures, syncope, facial asymmetry, speech difficulty and numbness  Hematological: Negative  Does not bruise/bleed easily  Psychiatric/Behavioral: Positive for sleep disturbance  Negative for confusion and hallucinations

## 2018-10-18 ENCOUNTER — IMMUNIZATION (OUTPATIENT)
Dept: INTERNAL MEDICINE CLINIC | Facility: CLINIC | Age: 63
End: 2018-10-18
Payer: COMMERCIAL

## 2018-10-18 DIAGNOSIS — Z23 NEED FOR IMMUNIZATION AGAINST INFLUENZA: Primary | ICD-10-CM

## 2018-10-18 PROCEDURE — 90682 RIV4 VACC RECOMBINANT DNA IM: CPT

## 2018-10-18 PROCEDURE — 90471 IMMUNIZATION ADMIN: CPT

## 2018-11-15 ENCOUNTER — HOSPITAL ENCOUNTER (OUTPATIENT)
Dept: NEUROLOGY | Facility: AMBULATORY SURGERY CENTER | Age: 63
Discharge: HOME | End: 2018-11-15
Payer: COMMERCIAL

## 2018-11-15 DIAGNOSIS — G89.29 CHRONIC LEFT-SIDED LOW BACK PAIN, WITH SCIATICA PRESENCE UNSPECIFIED: ICD-10-CM

## 2018-11-15 DIAGNOSIS — M54.5 CHRONIC LEFT-SIDED LOW BACK PAIN, WITH SCIATICA PRESENCE UNSPECIFIED: ICD-10-CM

## 2018-11-15 PROCEDURE — 95886 MUSC TEST DONE W/N TEST COMP: CPT | Performed by: PSYCHIATRY & NEUROLOGY

## 2018-11-15 PROCEDURE — 95910 NRV CNDJ TEST 7-8 STUDIES: CPT | Performed by: PSYCHIATRY & NEUROLOGY

## 2018-11-16 ENCOUNTER — TELEPHONE (OUTPATIENT)
Dept: NEUROLOGY | Facility: CLINIC | Age: 63
End: 2018-11-16

## 2018-11-16 DIAGNOSIS — M54.16 LUMBAR RADICULOPATHY: Primary | ICD-10-CM

## 2018-11-16 NOTE — TELEPHONE ENCOUNTER
LMOM for patient to return call     ----- Message from Moody Almaguer MD sent at 11/16/2018 11:01 AM EST -----  Pls call this patient and let him know that his EMG from yesterday was abnormal and showed a pinched nerve from lower back (interestingly on right side and not the left where he predominantly has sympotms)  I have ordered a MRI L spine, please let him know and schedule it  I did leave him a message too       Thanks

## 2018-11-30 ENCOUNTER — TELEPHONE (OUTPATIENT)
Dept: NEUROLOGY | Facility: CLINIC | Age: 63
End: 2018-11-30

## 2018-12-11 ENCOUNTER — HOSPITAL ENCOUNTER (OUTPATIENT)
Dept: MRI IMAGING | Facility: HOSPITAL | Age: 63
Discharge: HOME | End: 2018-12-11
Attending: PSYCHIATRY & NEUROLOGY
Payer: COMMERCIAL

## 2018-12-11 DIAGNOSIS — M54.16 LUMBAR RADICULOPATHY: ICD-10-CM

## 2018-12-11 PROCEDURE — 72148 MRI LUMBAR SPINE W/O DYE: CPT

## 2018-12-18 ENCOUNTER — OFFICE VISIT (OUTPATIENT)
Dept: NEUROLOGY | Facility: CLINIC | Age: 63
End: 2018-12-18
Payer: COMMERCIAL

## 2018-12-18 VITALS
SYSTOLIC BLOOD PRESSURE: 128 MMHG | HEIGHT: 73 IN | WEIGHT: 231 LBS | HEART RATE: 88 BPM | BODY MASS INDEX: 30.62 KG/M2 | DIASTOLIC BLOOD PRESSURE: 69 MMHG

## 2018-12-18 DIAGNOSIS — M54.5 CHRONIC LEFT-SIDED LOW BACK PAIN, WITH SCIATICA PRESENCE UNSPECIFIED: Primary | ICD-10-CM

## 2018-12-18 DIAGNOSIS — R29.898 WEAKNESS OF LEFT LEG: ICD-10-CM

## 2018-12-18 DIAGNOSIS — M54.16 RADICULOPATHY, LUMBAR REGION: ICD-10-CM

## 2018-12-18 DIAGNOSIS — E34.9 ENDOCRINE DISORDER: ICD-10-CM

## 2018-12-18 DIAGNOSIS — G89.29 CHRONIC LEFT-SIDED LOW BACK PAIN, WITH SCIATICA PRESENCE UNSPECIFIED: Primary | ICD-10-CM

## 2018-12-18 DIAGNOSIS — N40.0 ENLARGED PROSTATE WITHOUT LOWER URINARY TRACT SYMPTOMS (LUTS): ICD-10-CM

## 2018-12-18 PROCEDURE — 99214 OFFICE O/P EST MOD 30 MIN: CPT | Performed by: PSYCHIATRY & NEUROLOGY

## 2018-12-18 NOTE — LETTER
December 19, 2018     Sylvester Bah, [address] [place] [address]    Patient: June Rizo   YOB: 1955   Date of Visit: 12/18/2018       Dear Dr Larry Corley: Thank you for referring Enrique Beyer to me for evaluation  Below are my notes for this consultation  If you have questions, please do not hesitate to call me  I look forward to following your patient along with you  Sincerely,        Jerman Sampson MD        CC: No Recipients  Jerman Sampson MD  12/19/2018 10:38 AM  Sign at close encounter  Patient ID: June Rizo is a 59 y o  male  Assessment/Plan:    Radiculopathy, lumbar region  This patient has had lower back pain for a few years, radiating to left lower extremity, has been noted to have a L4-5 radiculopathy in the past, he was evaluated by me a few months ago, reported worsening pain and weakness in the left lower extremity that led to a repeat EMG which showed bilateral radiculopathies affecting the L5-S1 level on the right and L4-5 levels of the left without ongoing denervation  We repeated the MRI of the lumbar spine, which is stable from his previous imaging, however continues to show right-sided disc herniation at L5 -S1 compressing the L5 nerve root  I reviewed the MRI findings with him, discussed all his results with him today  He is not interested in surgery right now, however has agreed to  meet with the spine surgeons, I provided him with a referral today  Beyond that, he continues to have pain in the left hip as well as the left knee, will follow up with his orthopedic surgeons/pain management to be evaluated for any possible injections  Beyond that, his examination was stable today, and I will see him for his return visit in about 6 months  Thank you very much for allowing me to participate in his care         Diagnoses and all orders for this visit:    Chronic left-sided low back pain, with sciatica presence unspecified    Weakness of left leg    Radiculopathy, lumbar region  -     Ambulatory referral to Neurosurgery; Future           Subjective:    HPI    I had the pleasure of seeing your patient in Neurology Clinic for neuromuscular evaluation  As you know, he is a 59-year-old man who was referred to me for evaluation of lower back pain and pain in the lower extremities  He was last seen by me in August, and comes in today for a follow-up visit  Since last being seen, he continues to have pain in the left lower extremity, mainly in the left hip and the knee  Has had pain in the right hip for years  Has been to pain management in the past, has been on gabapentin and flexeril  He had electrodiagnostic studies with my colleague last month  I personally reviewed the raw data  These were consistent with a bilateral radiculopathies, affecting L4-5 levels on the left, and L5-S1 on the right with ongoing denervation  I repeated his MRI of the lumbar spine, and personally reviewed it with the patient today as a part of this evaluation  It showed right-sided degenerative disc disease at L5-S1 level with mild to moderate neuroforaminal narrowing at this level, supporting the right L5 radiculopathy  The following portions of the patient's history were reviewed and updated as appropriate: allergies, current medications, past family history, past medical history, past social history, past surgical history and problem list          Objective:    Blood pressure 128/69, pulse 88, height 6' 1" (1 854 m), weight 105 kg (231 lb)  Physical Exam  General exam: Pt was awake, alert and oriented  HEENT: atraumatic, normocephalic  Normal oral mucosa, neck was supple, no lymphadenopathy  Normal peripheral pulses  Extremities did not show any edema or cyanosis  Neurological Exam  Neurologically, pt was awake and alert  Speech was normal, no dysarthria or aphasia  Cranial nerve exam showed normal extraocular movements, no nystagmus or diplopia  There was no ptosis at baseline or with sustained upward gaze  Strength of eye closure muscles was normal   Facial sensations were normal bilaterally  No facial weakness, able to blow out the cheeks and push the tongue in the cheeks well  No tongue atrophy or fasciculations  Motor exam revealed normal tone and muscle bulk  There was no atrophy, scapular winging, high arches, hammertoes, shortening of achilles tendons or any other features of neuromuscular disease  Muscle strength was normal in neck flexors and extensors, and all muscle groups in both upper extremities  Strength in the lower extremities was as follows (right/left): Hip flexors 5/5-, Knee extensors 5/5, knee flexors 5/4+, ankle dorsiflexors 5/5, ankle plantar flexors 5/5, ankle invertors 5/5, evertors 5/5  Reflexes were graded as 2+ throughout  Toes were downgoing  There was no exaggerated jaw jerk or sandoval's sign  No ankle clonus  Gait was normal and casual        ROS:  I reviewed the below ROS and what is mentioned in HPI, the remainder of ROS was negative  Review of Systems   Constitutional: Negative  Negative for appetite change and fever  HENT: Negative  Negative for hearing loss, tinnitus, trouble swallowing and voice change  Eyes: Negative  Negative for photophobia and pain  Respiratory: Negative  Negative for shortness of breath  Cardiovascular: Negative  Negative for palpitations  Gastrointestinal: Negative  Negative for nausea and vomiting  Endocrine: Negative  Negative for cold intolerance and heat intolerance  Genitourinary: Negative  Negative for dysuria, frequency and urgency  Musculoskeletal: Positive for myalgias  Negative for neck pain  Hip pain  Left leg pain  Knee pain     Skin: Negative  Negative for rash  Neurological: Negative  Negative for dizziness, tremors, seizures, syncope, facial asymmetry, speech difficulty, weakness, light-headedness, numbness and headaches  Hematological: Negative  Does not bruise/bleed easily  Psychiatric/Behavioral: Negative  Negative for confusion, hallucinations and sleep disturbance

## 2018-12-18 NOTE — PROGRESS NOTES
Patient ID: Barbie Gaviria is a 59 y o  male  Assessment/Plan:    Radiculopathy, lumbar region  This patient has had lower back pain for a few years, radiating to left lower extremity, has been noted to have a L4-5 radiculopathy in the past, he was evaluated by me a few months ago, reported worsening pain and weakness in the left lower extremity that led to a repeat EMG which showed bilateral radiculopathies affecting the L5-S1 level on the right and L4-5 levels of the left without ongoing denervation  We repeated the MRI of the lumbar spine, which is stable from his previous imaging, however continues to show right-sided disc herniation at L5 -S1 compressing the L5 nerve root  I reviewed the MRI findings with him, discussed all his results with him today  He is not interested in surgery right now, however has agreed to  meet with the spine surgeons, I provided him with a referral today  Beyond that, he continues to have pain in the left hip as well as the left knee, will follow up with his orthopedic surgeons/pain management to be evaluated for any possible injections  Beyond that, his examination was stable today, and I will see him for his return visit in about 6 months  Thank you very much for allowing me to participate in his care  Diagnoses and all orders for this visit:    Chronic left-sided low back pain, with sciatica presence unspecified    Weakness of left leg    Radiculopathy, lumbar region  -     Ambulatory referral to Neurosurgery; Future           Subjective:    HPI    I had the pleasure of seeing your patient in Neurology Clinic for neuromuscular evaluation  As you know, he is a 70-year-old man who was referred to me for evaluation of lower back pain and pain in the lower extremities  He was last seen by me in August, and comes in today for a follow-up visit  Since last being seen, he continues to have pain in the left lower extremity, mainly in the left hip and the knee   Has had pain in the right hip for years  Has been to pain management in the past, has been on gabapentin and flexeril  He had electrodiagnostic studies with my colleague last month  I personally reviewed the raw data  These were consistent with a bilateral radiculopathies, affecting L4-5 levels on the left, and L5-S1 on the right with ongoing denervation  I repeated his MRI of the lumbar spine, and personally reviewed it with the patient today as a part of this evaluation  It showed right-sided degenerative disc disease at L5-S1 level with mild to moderate neuroforaminal narrowing at this level, supporting the right L5 radiculopathy  The following portions of the patient's history were reviewed and updated as appropriate: allergies, current medications, past family history, past medical history, past social history, past surgical history and problem list          Objective:    Blood pressure 128/69, pulse 88, height 6' 1" (1 854 m), weight 105 kg (231 lb)  Physical Exam  General exam: Pt was awake, alert and oriented  HEENT: atraumatic, normocephalic  Normal oral mucosa, neck was supple, no lymphadenopathy  Normal peripheral pulses  Extremities did not show any edema or cyanosis  Neurological Exam  Neurologically, pt was awake and alert  Speech was normal, no dysarthria or aphasia  Cranial nerve exam showed normal extraocular movements, no nystagmus or diplopia  There was no ptosis at baseline or with sustained upward gaze  Strength of eye closure muscles was normal   Facial sensations were normal bilaterally  No facial weakness, able to blow out the cheeks and push the tongue in the cheeks well  No tongue atrophy or fasciculations  Motor exam revealed normal tone and muscle bulk  There was no atrophy, scapular winging, high arches, hammertoes, shortening of achilles tendons or any other features of neuromuscular disease     Muscle strength was normal in neck flexors and extensors, and all muscle groups in both upper extremities  Strength in the lower extremities was as follows (right/left): Hip flexors 5/5-, Knee extensors 5/5, knee flexors 5/4+, ankle dorsiflexors 5/5, ankle plantar flexors 5/5, ankle invertors 5/5, evertors 5/5  Reflexes were graded as 2+ throughout  Toes were downgoing  There was no exaggerated jaw jerk or sandoval's sign  No ankle clonus  Gait was normal and casual        ROS:  I reviewed the below ROS and what is mentioned in HPI, the remainder of ROS was negative  Review of Systems   Constitutional: Negative  Negative for appetite change and fever  HENT: Negative  Negative for hearing loss, tinnitus, trouble swallowing and voice change  Eyes: Negative  Negative for photophobia and pain  Respiratory: Negative  Negative for shortness of breath  Cardiovascular: Negative  Negative for palpitations  Gastrointestinal: Negative  Negative for nausea and vomiting  Endocrine: Negative  Negative for cold intolerance and heat intolerance  Genitourinary: Negative  Negative for dysuria, frequency and urgency  Musculoskeletal: Positive for myalgias  Negative for neck pain  Hip pain  Left leg pain  Knee pain     Skin: Negative  Negative for rash  Neurological: Negative  Negative for dizziness, tremors, seizures, syncope, facial asymmetry, speech difficulty, weakness, light-headedness, numbness and headaches  Hematological: Negative  Does not bruise/bleed easily  Psychiatric/Behavioral: Negative  Negative for confusion, hallucinations and sleep disturbance

## 2018-12-19 NOTE — ASSESSMENT & PLAN NOTE
This patient has had lower back pain for a few years, radiating to left lower extremity, has been noted to have a L4-5 radiculopathy in the past, he was evaluated by me a few months ago, reported worsening pain and weakness in the left lower extremity that led to a repeat EMG which showed bilateral radiculopathies affecting the L5-S1 level on the right and L4-5 levels of the left without ongoing denervation  We repeated the MRI of the lumbar spine, which is stable from his previous imaging, however continues to show right-sided disc herniation at L5 -S1 compressing the L5 nerve root  I reviewed the MRI findings with him, discussed all his results with him today  He is not interested in surgery right now, however has agreed to  meet with the spine surgeons, I provided him with a referral today  Beyond that, he continues to have pain in the left hip as well as the left knee, will follow up with his orthopedic surgeons/pain management to be evaluated for any possible injections  Beyond that, his examination was stable today, and I will see him for his return visit in about 6 months  Thank you very much for allowing me to participate in his care

## 2018-12-28 ENCOUNTER — TELEPHONE (OUTPATIENT)
Dept: INTERNAL MEDICINE CLINIC | Facility: CLINIC | Age: 63
End: 2018-12-28

## 2018-12-28 DIAGNOSIS — E78.2 MIXED HYPERLIPIDEMIA: ICD-10-CM

## 2018-12-28 DIAGNOSIS — Z12.5 SCREENING PSA (PROSTATE SPECIFIC ANTIGEN): Primary | ICD-10-CM

## 2018-12-28 DIAGNOSIS — Z11.59 NEED FOR HEPATITIS C SCREENING TEST: ICD-10-CM

## 2018-12-28 PROBLEM — E78.5 HYPERLIPIDEMIA: Status: ACTIVE | Noted: 2017-12-21

## 2018-12-31 LAB
PSA SERPL-MCNC: 1.3 NG/ML
TESTOST FREE SERPL-MCNC: 31.2 PG/ML (ref 35–155)
TESTOST SERPL-MCNC: 211 NG/DL (ref 250–1100)

## 2019-01-04 LAB
ALBUMIN SERPL-MCNC: 4.2 G/DL (ref 3.6–5.1)
ALBUMIN/GLOB SERPL: 1.7 (CALC) (ref 1–2.5)
ALP SERPL-CCNC: 72 U/L (ref 40–115)
ALT SERPL-CCNC: 12 U/L (ref 9–46)
AST SERPL-CCNC: 16 U/L (ref 10–35)
BASOPHILS # BLD AUTO: 29 CELLS/UL (ref 0–200)
BASOPHILS NFR BLD AUTO: 0.4 %
BILIRUB SERPL-MCNC: 0.6 MG/DL (ref 0.2–1.2)
BUN SERPL-MCNC: 18 MG/DL (ref 7–25)
BUN/CREAT SERPL: NORMAL (CALC) (ref 6–22)
CALCIUM SERPL-MCNC: 9.1 MG/DL (ref 8.6–10.3)
CHLORIDE SERPL-SCNC: 104 MMOL/L (ref 98–110)
CHOLEST SERPL-MCNC: 204 MG/DL
CHOLEST/HDLC SERPL: 4.5 (CALC)
CO2 SERPL-SCNC: 29 MMOL/L (ref 20–32)
CREAT SERPL-MCNC: 1.14 MG/DL (ref 0.7–1.25)
EOSINOPHIL # BLD AUTO: 130 CELLS/UL (ref 15–500)
EOSINOPHIL NFR BLD AUTO: 1.8 %
ERYTHROCYTE [DISTWIDTH] IN BLOOD BY AUTOMATED COUNT: 13 % (ref 11–15)
GLOBULIN SER CALC-MCNC: 2.5 G/DL (CALC) (ref 1.9–3.7)
GLUCOSE SERPL-MCNC: 89 MG/DL (ref 65–99)
HCT VFR BLD AUTO: 44.6 % (ref 38.5–50)
HCV AB S/CO SERPL IA: 0.01
HCV AB SERPL QL IA: NORMAL
HDLC SERPL-MCNC: 45 MG/DL
HGB BLD-MCNC: 15.1 G/DL (ref 13.2–17.1)
LDLC SERPL CALC-MCNC: 130 MG/DL (CALC)
LYMPHOCYTES # BLD AUTO: 2765 CELLS/UL (ref 850–3900)
LYMPHOCYTES NFR BLD AUTO: 38.4 %
MCH RBC QN AUTO: 29.6 PG (ref 27–33)
MCHC RBC AUTO-ENTMCNC: 33.9 G/DL (ref 32–36)
MCV RBC AUTO: 87.5 FL (ref 80–100)
MONOCYTES # BLD AUTO: 814 CELLS/UL (ref 200–950)
MONOCYTES NFR BLD AUTO: 11.3 %
NEUTROPHILS # BLD AUTO: 3463 CELLS/UL (ref 1500–7800)
NEUTROPHILS NFR BLD AUTO: 48.1 %
NONHDLC SERPL-MCNC: 159 MG/DL (CALC)
PLATELET # BLD AUTO: 271 THOUSAND/UL (ref 140–400)
PMV BLD REES-ECKER: 9.7 FL (ref 7.5–12.5)
POTASSIUM SERPL-SCNC: 4.3 MMOL/L (ref 3.5–5.3)
PROT SERPL-MCNC: 6.7 G/DL (ref 6.1–8.1)
PSA SERPL-MCNC: 1.3 NG/ML
RBC # BLD AUTO: 5.1 MILLION/UL (ref 4.2–5.8)
SL AMB EGFR AFRICAN AMERICAN: 79 ML/MIN/1.73M2
SL AMB EGFR NON AFRICAN AMERICAN: 69 ML/MIN/1.73M2
SODIUM SERPL-SCNC: 140 MMOL/L (ref 135–146)
TRIGL SERPL-MCNC: 173 MG/DL
WBC # BLD AUTO: 7.2 THOUSAND/UL (ref 3.8–10.8)

## 2019-01-09 ENCOUNTER — OFFICE VISIT (OUTPATIENT)
Dept: NEUROSURGERY | Facility: CLINIC | Age: 64
End: 2019-01-09
Payer: COMMERCIAL

## 2019-01-09 VITALS
SYSTOLIC BLOOD PRESSURE: 123 MMHG | DIASTOLIC BLOOD PRESSURE: 85 MMHG | WEIGHT: 234.4 LBS | HEART RATE: 76 BPM | HEIGHT: 73 IN | BODY MASS INDEX: 31.07 KG/M2 | TEMPERATURE: 97.8 F

## 2019-01-09 DIAGNOSIS — M54.16 RADICULOPATHY, LUMBAR REGION: ICD-10-CM

## 2019-01-09 DIAGNOSIS — M47.816 LUMBAR SPONDYLOSIS: Primary | ICD-10-CM

## 2019-01-09 PROCEDURE — 99213 OFFICE O/P EST LOW 20 MIN: CPT | Performed by: PHYSICIAN ASSISTANT

## 2019-01-09 NOTE — LETTER
January 10, 2019     Howard Engel MD  [facility] [address]    Patient: Tanika Henderson   YOB: 1955   Date of Visit: 1/9/2019       Dear Dr Francia Kate: Thank you for referring Sascha Baldwin to me for evaluation  Below are my notes for this consultation  If you have questions, please do not hesitate to call me  I look forward to following your patient along with you  Sincerely,        Jace Gomes PA-C        CC: MD Jace Bustos PA-C  1/10/2019  7:26 AM  Sign at close encounter  Assessment/Plan:    Lumbar spondylosis  -     Ambulatory referral to Physical Therapy; Future  -     Ambulatory referral to Pain Management; Future    Radiculopathy, lumbar region  -     Ambulatory referral to Physical Therapy; Future  -     Ambulatory referral to Pain Management; Future        Discussion / Summary: This is a 59 y o  male who presents for neurosurgical consultation referred by Dr Мария Mcmullen of Neurology for lumbar radiculopathy  Lumbar spine MRI  ( 12/11/18  )  was reviewed in detail by Dr Nair Needle  There is presence of degenerative changes but no significant stenosis  EMG/NCS of lower extremities reports subacute right L5-S1 radiculopathy and right L4-L5 radiculopathy  It is difficulty to account for his LLE pain based on L-spine MRI  His left hip may contribute to some of his pain as he appears to have some left hip discomfort on provocative maneuver  Lumbar neurosurgical intervention is not recommended at this juncture  Mr Maddi Medrano is recommended to pursue Physical therapy and follow up with Dr Analia Almendarez of Pain Management for further evaluation / management of his LBP and lower extremity pains  He is provided with referral to Physical therapy and to Dr Analia Almendraez of Pain management  Also encouraged ongoing follow up with established Neurologist (Dr Мария Mcmullen)  Further neurosurgical follow up with our office may be on as needed basis      Patient is to contact our office or present to hospital Emergency Room if experience worsening or new back/leg pain, sensory or motor change, bladder or bowel function change, or other neurological change  Patient expressed understanding and agreement       ________________________________________________________________________________    Subjective:      Patient ID: Jose Cazares is a 59 y o  male who presents for neurosurgical consultation referred by Dr Brandi Swain of Neurology for lumbar radiculopathy  HPI   Patient eports history of low back pain for several years  He reports worsening of low back pain and b/l lower extremity pain about 1 year ago when walking in [employer]    Patient reports he saw an orthopedic provider about 3 years ago and was informed he had disc bulge  in low back  More recently he has seen Dr Brandi Swain of Neurology  He underwent EMG/ NCS of LEs 11/15/18  reported subacute right L5-S1 radiculopathy and right L4-L5 radiculopathy  He also underwent L-spine MRI 12/11/18  Patient reports right-sided low back pain  He reports his low back pain is constant  He currently rates low back pain is 2/10 on pain scale and describes as sharp pain  He reports movement activity is triggering factor  He reports use of an inversion table have provided some temporary relief in past     Patient reports the majority of his pain is located in the left hip/anterior left thigh region  He describes pain that extends from his left hip down his anterior left thigh stopping at his left knee  He reports left lower extremity pain is constant  His LLE pain can be present with walking but reports is worse when lying sleeping  He currently rates left lower extremity pain as 1-2/10 on pain scale  He reports Advil provide some relief  Diclofenac and gabapentin has also helped some      He denies any pain in his right lower extremity currently although does report he experiences occasional pain down the lateral right thigh stopping around his knee  He reports he last experienced right lower extremity pain earlier today  Walking can trigger his right lower extremity pain  Advil, diclofenac, and gabapentin can provide some relief  He denies numbness in his lower extremities currently but does report in the past he had experience numbness in legs in the same distribution as his leg pain  He reports he had noticed numbness after walking or if he had bad posture   He reports history of left leg weakness that he has noticed with walking down steps  He reports this is been going on for the last year  He does mention that he has tripped when walking down steps  Patient reports he completed course of Physical therapy about 3 years ago for his low back and also some physical therapy for his low back last year when he was undergoing physical therapy primarily for his neck  He has previously been seen by Kristi Downs PA-C with our practice in regards to his cervical spine  Mr David Butler has previously seen Dr Torsten Rangel of Pain Management for his neck  He denies undergoing low back injections  Patient reports history of enlarged prostate with prolonged emptying when voiding  Patient reports he follow with Urologist    He denies incontinence of bladder or bowels  He denies saddle paresthesias  The following portions of the patient's history were reviewed and updated as appropriate: allergies, current medications, past family history, past medical history, past social history and past surgical history  Review of Systems   Constitutional: Negative for fever  HENT: Negative  Dry mouth w/ snore   Eyes:        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 -- seen at [facility]   Respiratory: Negative for shortness of breath  Cardiovascular: Negative for chest pain  Gastrointestinal:        Denies bowel dysfunction    Genitourinary:        See HPI   Musculoskeletal: Positive for back pain     Neurological:        See HPI    Psychiatric/Behavioral: Negative for agitation  Objective:      /85 (BP Location: Left arm, Patient Position: Sitting, Cuff Size: Standard)   Pulse 76   Temp 97 8 °F (36 6 °C) (Temporal)   Ht 6' 1" (1 854 m)   Wt 106 kg (234 lb 6 4 oz)   BMI 30 93 kg/m²           Physical Exam   Constitutional: He appears well-developed and well-nourished  No distress  Eyes: Conjunctivae are normal  No scleral icterus  Cardiovascular: Normal rate  Pulmonary/Chest: Effort normal    Musculoskeletal:        Lumbar back: He exhibits no tenderness and no deformity  Neurological: He is alert  He has a normal Tandem Gait Test  Gait normal    Reflex Scores:       Tricep reflexes are 1+ on the right side and 1+ on the left side  Bicep reflexes are 2+ on the right side and 2+ on the left side  Brachioradialis reflexes are 1+ on the right side and 1+ on the left side  Patellar reflexes are 2+ on the right side and 2+ on the left side  Achilles reflexes are 1+ on the right side and 1+ on the left side  Skin: Skin is warm and dry  Surgical scar left forearm  Psychiatric: He has a normal mood and affect  His speech is normal        Neurologic Exam     Mental Status   Speech: speech is normal   Level of consciousness: alert    Motor Exam     Motor:  Shoulder abduction 5/5 bilaterally  Elbow flexion/extension 5/5 bilaterally  Wrist flexion/extension 5/5 bilaterally  Finger  / abduction 5/5 bilaterally  Hip flexion/abduction/adduction 5/5 bilaterally  Knee flexion/extension 5/5 bilaterally  Ankle DF/PF 5/5 bilaterally  Great toe DF 5/5 bilaterally  Sensory Exam     Sensation to pinprick slightly diminished of lateral left calf / dorsum left foot  Sensation to pinprick otherwise intact of b/l UEs, torso, and b/l LEs  JPS and Vibratory sense intact b/l thumbs and great toes         Gait, Coordination, and Reflexes     Gait  Gait: normal (Independent )    Coordination   Tandem walking coordination: normal    Tremor   Resting tremor: absent    Reflexes   Right brachioradialis: 1+  Left brachioradialis: 1+  Right biceps: 2+  Left biceps: 2+  Right triceps: 1+  Left triceps: 1+  Right patellar: 2+  Left patellar: 2+  Right achilles: 1+  Left achilles: 1+  Right plantar: normal  Left plantar: normal  Right ankle clonus: absent  Left ankle clonus: absent        Imaging study:      12/11/18 [facility] L-spine MRI without contrast -- per report: " MRI LUMBAR SPINE WITHOUT CONTRAST     INDICATION: [id number]: Radiculopathy, lumbar region      COMPARISON:  11/13/2016     TECHNIQUE:  Sagittal T1, sagittal T2, sagittal inversion recovery, axial T1 and axial T2, coronal T2        IMAGE QUALITY:  Diagnostic     FINDINGS:     ALIGNMENT:  Normal alignment of the lumbar spine  No compression fracture  No spondylolysis or spondylolisthesis  No scoliosis      MARROW SIGNAL:  Minimal endplate marrow degenerative change at L5-S1 towards the right      DISTAL CORD AND CONUS:  Normal size and signal within the distal cord and conus  The conus ends at the L2 level      PARASPINAL SOFT TISSUES:  Paraspinal soft tissues are unremarkable      SACRUM:  Normal signal within the sacrum  No evidence of insufficiency or stress fracture      LOWER THORACIC DISC SPACES:  Normal disc height and signal   No disc herniation, canal stenosis or foraminal narrowing      LUMBAR DISC SPACES:     L1-L2:  Normal      L2-L3:  Normal      L3-L4:  Normal disc height and signal   Slight facet arthropathy  No canal stenosis or foraminal narrowing      L4-L5:  Minimal annular bulging  There is an annular fissure noted within the right foraminal portion of the annulus  Mild facet arthropathy  No significant canal stenosis or foraminal narrowing  No discrete nerve impingement      L5-S1:  Small broad-based right foraminal disc protrusion with mild endplate hypertrophic change    Mild right-sided facet degenerative change  There is no canal stenosis or left foraminal narrowing  There is mild to moderate right foraminal narrowing   as a result of disc changes and facet changes encroaching upon the neural foramen  Findings appear unchanged compared to the prior examination      IMPRESSION:     Right-sided degenerative disc disease at the L5-S1 level with stable right-sided foraminal narrowing    Correlate for right L5 radiculopathy         Workstation performed: [id number] "

## 2019-01-09 NOTE — PROGRESS NOTES
Assessment/Plan:    Lumbar spondylosis  -     Ambulatory referral to Physical Therapy; Future  -     Ambulatory referral to Pain Management; Future    Radiculopathy, lumbar region  -     Ambulatory referral to Physical Therapy; Future  -     Ambulatory referral to Pain Management; Future        Discussion / Summary: This is a 59 y o  male who presents for neurosurgical consultation referred by Dr Jose Delgadillo of Neurology for lumbar radiculopathy  Lumbar spine MRI  ( 12/11/18  )  was reviewed in detail by Dr Cassandra Chauhan  There is presence of degenerative changes but no significant stenosis  EMG/NCS of lower extremities reports subacute right L5-S1 radiculopathy and right L4-L5 radiculopathy  It is difficulty to account for his LLE pain based on L-spine MRI  His left hip may contribute to some of his pain as he appears to have some left hip discomfort on provocative maneuver  Lumbar neurosurgical intervention is not recommended at this juncture  Mr Nicole Beltran is recommended to pursue Physical therapy and follow up with Dr Vadim Greenberg of Pain Management for further evaluation / management of his LBP and lower extremity pains  He is provided with referral to Physical therapy and to Dr Vadim Greenberg of Pain management  Also encouraged ongoing follow up with established Neurologist (Dr Jose Delgadillo)  Further neurosurgical follow up with our office may be on as needed basis  Patient is to contact our office or present to hospital Emergency Room if experience worsening or new back/leg pain, sensory or motor change, bladder or bowel function change, or other neurological change  Patient expressed understanding and agreement       ________________________________________________________________________________    Subjective:      Patient ID: Antoinette Morillo is a 59 y o  male who presents for neurosurgical consultation referred by Dr Jose Delgadillo of Neurology for lumbar radiculopathy      HPI   Patient eports history of low back pain for several years  He reports worsening of low back pain and b/l lower extremity pain about 1 year ago when walking in [employer]    Patient reports he saw an orthopedic provider about 3 years ago and was informed he had disc bulge  in low back  More recently he has seen Dr Eleazar Russell of Neurology  He underwent EMG/ NCS of LEs 11/15/18 - reported subacute right L5-S1 radiculopathy and right L4-L5 radiculopathy  He also underwent L-spine MRI 12/11/18  Patient reports right-sided low back pain  He reports his low back pain is constant  He currently rates low back pain is 2/10 on pain scale and describes as sharp pain  He reports movement activity is triggering factor  He reports use of an inversion table have provided some temporary relief in past     Patient reports the majority of his pain is located in the left hip/anterior left thigh region  He describes pain that extends from his left hip down his anterior left thigh stopping at his left knee  He reports left lower extremity pain is constant  His LLE pain can be present with walking but reports is worse when lying sleeping  He currently rates left lower extremity pain as 1-2/10 on pain scale  He reports Advil provide some relief  Diclofenac and gabapentin has also helped some  He denies any pain in his right lower extremity currently although does report he experiences occasional pain down the lateral right thigh stopping around his knee  He reports he last experienced right lower extremity pain earlier today  Walking can trigger his right lower extremity pain  Advil, diclofenac, and gabapentin can provide some relief  He denies numbness in his lower extremities currently but does report in the past he had experience numbness in legs in the same distribution as his leg pain  He reports he had noticed numbness after walking or if he had bad posture   He reports history of left leg weakness that he has noticed with walking down steps  He reports this is been going on for the last year  He does mention that he has tripped when walking down steps  Patient reports he completed course of Physical therapy about 3 years ago for his low back and also some physical therapy for his low back last year when he was undergoing physical therapy primarily for his neck  He has previously been seen by Raisa Call PA-C with our practice in regards to his cervical spine  Mr Lynann Boxer has previously seen Dr Tess Henry of Pain Management for his neck  He denies undergoing low back injections  Patient reports history of enlarged prostate with prolonged emptying when voiding  Patient reports he follow with Urologist    He denies incontinence of bladder or bowels  He denies saddle paresthesias  The following portions of the patient's history were reviewed and updated as appropriate: allergies, current medications, past family history, past medical history, past social history and past surgical history  Review of Systems   Constitutional: Negative for fever  HENT: Negative  Dry mouth w/ snore   Eyes:        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 -- seen at [facility]   Respiratory: Negative for shortness of breath  Cardiovascular: Negative for chest pain  Gastrointestinal:        Denies bowel dysfunction    Genitourinary:        See HPI   Musculoskeletal: Positive for back pain  Neurological:        See HPI    Psychiatric/Behavioral: Negative for agitation  Objective:      /85 (BP Location: Left arm, Patient Position: Sitting, Cuff Size: Standard)   Pulse 76   Temp 97 8 °F (36 6 °C) (Temporal)   Ht 6' 1" (1 854 m)   Wt 106 kg (234 lb 6 4 oz)   BMI 30 93 kg/m²          Physical Exam   Constitutional: He appears well-developed and well-nourished  No distress  Eyes: Conjunctivae are normal  No scleral icterus  Cardiovascular: Normal rate      Pulmonary/Chest: Effort normal    Musculoskeletal:        Lumbar back: He exhibits no tenderness and no deformity  Neurological: He is alert  He has a normal Tandem Gait Test  Gait normal    Reflex Scores:       Tricep reflexes are 1+ on the right side and 1+ on the left side  Bicep reflexes are 2+ on the right side and 2+ on the left side  Brachioradialis reflexes are 1+ on the right side and 1+ on the left side  Patellar reflexes are 2+ on the right side and 2+ on the left side  Achilles reflexes are 1+ on the right side and 1+ on the left side  Skin: Skin is warm and dry  Surgical scar left forearm  Psychiatric: He has a normal mood and affect  His speech is normal        Neurologic Exam     Mental Status   Speech: speech is normal   Level of consciousness: alert    Motor Exam     Motor:  Shoulder abduction 5/5 bilaterally  Elbow flexion/extension 5/5 bilaterally  Wrist flexion/extension 5/5 bilaterally  Finger  / abduction 5/5 bilaterally  Hip flexion/abduction/adduction 5/5 bilaterally  Knee flexion/extension 5/5 bilaterally  Ankle DF/PF 5/5 bilaterally  Great toe DF 5/5 bilaterally  Sensory Exam     Sensation to pinprick slightly diminished of lateral left calf / dorsum left foot  Sensation to pinprick otherwise intact of b/l UEs, torso, and b/l LEs  JPS and Vibratory sense intact b/l thumbs and great toes         Gait, Coordination, and Reflexes     Gait  Gait: normal (Independent )    Coordination   Tandem walking coordination: normal    Tremor   Resting tremor: absent    Reflexes   Right brachioradialis: 1+  Left brachioradialis: 1+  Right biceps: 2+  Left biceps: 2+  Right triceps: 1+  Left triceps: 1+  Right patellar: 2+  Left patellar: 2+  Right achilles: 1+  Left achilles: 1+  Right plantar: normal  Left plantar: normal  Right ankle clonus: absent  Left ankle clonus: absent        Imaging study:      12/11/18 [facility] L-spine MRI without contrast -- per report: " MRI LUMBAR SPINE WITHOUT CONTRAST     INDICATION: [id number]: Radiculopathy, lumbar region      COMPARISON:  11/13/2016     TECHNIQUE:  Sagittal T1, sagittal T2, sagittal inversion recovery, axial T1 and axial T2, coronal T2        IMAGE QUALITY:  Diagnostic     FINDINGS:     ALIGNMENT:  Normal alignment of the lumbar spine  No compression fracture  No spondylolysis or spondylolisthesis  No scoliosis      MARROW SIGNAL:  Minimal endplate marrow degenerative change at L5-S1 towards the right      DISTAL CORD AND CONUS:  Normal size and signal within the distal cord and conus  The conus ends at the L2 level      PARASPINAL SOFT TISSUES:  Paraspinal soft tissues are unremarkable      SACRUM:  Normal signal within the sacrum  No evidence of insufficiency or stress fracture      LOWER THORACIC DISC SPACES:  Normal disc height and signal   No disc herniation, canal stenosis or foraminal narrowing      LUMBAR DISC SPACES:     L1-L2:  Normal      L2-L3:  Normal      L3-L4:  Normal disc height and signal   Slight facet arthropathy  No canal stenosis or foraminal narrowing      L4-L5:  Minimal annular bulging  There is an annular fissure noted within the right foraminal portion of the annulus  Mild facet arthropathy  No significant canal stenosis or foraminal narrowing  No discrete nerve impingement      L5-S1:  Small broad-based right foraminal disc protrusion with mild endplate hypertrophic change  Mild right-sided facet degenerative change  There is no canal stenosis or left foraminal narrowing  There is mild to moderate right foraminal narrowing   as a result of disc changes and facet changes encroaching upon the neural foramen  Findings appear unchanged compared to the prior examination      IMPRESSION:     Right-sided degenerative disc disease at the L5-S1 level with stable right-sided foraminal narrowing    Correlate for right L5 radiculopathy         Workstation performed: [id number] "

## 2019-01-09 NOTE — PATIENT INSTRUCTIONS
Recommend you pursue course of Physical Therapy  Recommend you pursue follow up with Pain Management (Dr Everett Newby)  Also encourage follow up with your established Neurologist (Dr Rosa Harmon)  Contact our office or present to hospital Emergency Room if experience worsening or new back/leg pain, sensory or motor change, bladder or bowel function change, or other neurological change

## 2019-01-18 ENCOUNTER — OFFICE VISIT (OUTPATIENT)
Dept: INTERNAL MEDICINE CLINIC | Facility: CLINIC | Age: 64
End: 2019-01-18
Payer: COMMERCIAL

## 2019-01-18 VITALS
SYSTOLIC BLOOD PRESSURE: 124 MMHG | DIASTOLIC BLOOD PRESSURE: 64 MMHG | HEART RATE: 88 BPM | OXYGEN SATURATION: 98 % | WEIGHT: 228.4 LBS | BODY MASS INDEX: 30.27 KG/M2 | HEIGHT: 73 IN

## 2019-01-18 DIAGNOSIS — Z00.00 WELLNESS EXAMINATION: Primary | ICD-10-CM

## 2019-01-18 DIAGNOSIS — G47.00 INSOMNIA, UNSPECIFIED TYPE: ICD-10-CM

## 2019-01-18 DIAGNOSIS — Z12.5 SCREENING PSA (PROSTATE SPECIFIC ANTIGEN): ICD-10-CM

## 2019-01-18 DIAGNOSIS — Z12.11 SCREENING FOR COLON CANCER: ICD-10-CM

## 2019-01-18 DIAGNOSIS — L70.9 ACNE, UNSPECIFIED ACNE TYPE: ICD-10-CM

## 2019-01-18 DIAGNOSIS — E78.2 MIXED HYPERLIPIDEMIA: ICD-10-CM

## 2019-01-18 DIAGNOSIS — F41.9 ANXIETY: ICD-10-CM

## 2019-01-18 PROCEDURE — 99396 PREV VISIT EST AGE 40-64: CPT | Performed by: INTERNAL MEDICINE

## 2019-01-18 RX ORDER — ALPRAZOLAM 1 MG/1
TABLET ORAL
Qty: 15 TABLET | Refills: 0 | Status: SHIPPED | OUTPATIENT
Start: 2019-01-18 | End: 2020-01-21 | Stop reason: SDUPTHER

## 2019-01-18 RX ORDER — ZOLPIDEM TARTRATE 10 MG/1
10 TABLET ORAL
Qty: 30 TABLET | Refills: 0 | Status: SHIPPED | OUTPATIENT
Start: 2019-01-18 | End: 2020-01-21 | Stop reason: SDUPTHER

## 2019-01-18 RX ORDER — ADAPALENE AND BENZOYL PEROXIDE 3; 25 MG/G; MG/G
1 GEL TOPICAL DAILY
Qty: 60 G | Refills: 0 | Status: SHIPPED | OUTPATIENT
Start: 2019-01-18 | End: 2019-08-20 | Stop reason: SDUPTHER

## 2019-01-18 NOTE — PROGRESS NOTES
Assessment/Plan:  [place]MP checked and is appropriate  Consider new shingles vaccine     Problem List Items Addressed This Visit        Other    Anxiety    Relevant Medications    ALPRAZolam (XANAX) 1 mg tablet    Hyperlipidemia    Relevant Orders    Lipid panel    CBC and differential    Comprehensive metabolic panel      Other Visit Diagnoses     Wellness examination    -  Primary    Screening for colon cancer        Relevant Orders    Ambulatory referral to Gastroenterology    Acne, unspecified acne type        Relevant Medications    Adapalene-Benzoyl Peroxide (EPIDUO FORTE) 0 3-2 5 % GEL    Insomnia, unspecified type        Relevant Medications    zolpidem (AMBIEN) 10 mg tablet    Screening PSA (prostate specific antigen)        Relevant Orders    PSA, Total Screen          Discussed Shingrix    Subjective:      Patient ID: Shameka Wasserman is a 59 y o  male  HPI   Here for a wellness  Ongoing low back issues  Takes diclofenac prn, experiences nightmares when he takes it 3 days  He takes gabapentin prn as well  He has decided to hold off on going back to pain mgt  Up to date with metabolic screening-, trig 173  Healthy diet  Back to going to the gym  Up to date with PSA screening and saw urology 2018  Due for colonoscopy     The following portions of the patient's history were reviewed and updated as appropriate: allergies, past family history, past medical history, past social history, past surgical history and problem list     Review of Systems   Constitutional: Negative for fatigue, fever and unexpected weight change  HENT: Negative for sinus pain, sinus pressure and sore throat  Respiratory: Negative for cough, shortness of breath and wheezing  Cardiovascular: Negative for chest pain, palpitations and leg swelling  Gastrointestinal: Negative for abdominal pain, constipation, diarrhea, nausea and vomiting  Genitourinary: Positive for difficulty urinating (weaker stream)          ED but Cialis too expensive  Nocturia   Musculoskeletal: Positive for back pain  Negative for arthralgias and myalgias  Skin:        Back acne, needs refill of Epiduo   Neurological: Negative for dizziness and headaches  Psychiatric/Behavioral: Positive for sleep disturbance (takes Ambien prn, a few times a month)  The patient is nervous/anxious (takes Xanax a few times a month)  Objective:      /64   Pulse 88   Ht 6' 1" (1 854 m)   Wt 104 kg (228 lb 6 4 oz)   SpO2 98%   BMI 30 13 kg/m²          Physical Exam   Constitutional: He is oriented to person, place, and time  He appears well-developed and well-nourished  HENT:   Head: Normocephalic and atraumatic  Right Ear: External ear normal    Left Ear: External ear normal    Mouth/Throat: Oropharynx is clear and moist    Eyes: Conjunctivae are normal    Neck: Neck supple  Cardiovascular: Normal rate, regular rhythm and normal heart sounds  No murmur heard  Pulmonary/Chest: Effort normal and breath sounds normal  No respiratory distress  He has no wheezes  He has no rales  Abdominal: Soft  Bowel sounds are normal  He exhibits no distension and no mass  There is no tenderness  There is no rebound and no guarding  Musculoskeletal: Normal range of motion  Neurological: He is alert and oriented to person, place, and time  Skin: Skin is warm and dry  Psychiatric: He has a normal mood and affect   His behavior is normal  Judgment and thought content normal

## 2019-04-05 ENCOUNTER — OFFICE VISIT (OUTPATIENT)
Dept: UROLOGY | Facility: MEDICAL CENTER | Age: 64
End: 2019-04-05
Payer: COMMERCIAL

## 2019-04-05 VITALS
BODY MASS INDEX: 30.09 KG/M2 | HEIGHT: 73 IN | SYSTOLIC BLOOD PRESSURE: 110 MMHG | HEART RATE: 74 BPM | DIASTOLIC BLOOD PRESSURE: 80 MMHG | WEIGHT: 227 LBS

## 2019-04-05 DIAGNOSIS — N40.0 ENLARGED PROSTATE WITHOUT LOWER URINARY TRACT SYMPTOMS (LUTS): Primary | ICD-10-CM

## 2019-04-05 LAB
SL AMB  POCT GLUCOSE, UA: NORMAL
SL AMB LEUKOCYTE ESTERASE,UA: NORMAL
SL AMB POCT BILIRUBIN,UA: NORMAL
SL AMB POCT BLOOD,UA: NORMAL
SL AMB POCT CLARITY,UA: CLEAR
SL AMB POCT COLOR,UA: YELLOW
SL AMB POCT KETONES,UA: NORMAL
SL AMB POCT NITRITE,UA: NORMAL
SL AMB POCT PH,UA: 6
SL AMB POCT SPECIFIC GRAVITY,UA: 1.01
SL AMB POCT URINE PROTEIN: NORMAL
SL AMB POCT UROBILINOGEN: 0.2

## 2019-04-05 PROCEDURE — 81003 URINALYSIS AUTO W/O SCOPE: CPT | Performed by: UROLOGY

## 2019-04-05 PROCEDURE — 99214 OFFICE O/P EST MOD 30 MIN: CPT | Performed by: UROLOGY

## 2019-04-05 RX ORDER — TADALAFIL 5 MG/1
TABLET ORAL
Qty: 90 TABLET | Refills: 3 | Status: SHIPPED | OUTPATIENT
Start: 2019-04-05 | End: 2020-03-13

## 2019-04-10 DIAGNOSIS — Z12.11 COLON CANCER SCREENING: Primary | ICD-10-CM

## 2019-04-11 ENCOUNTER — TELEPHONE (OUTPATIENT)
Dept: UROLOGY | Facility: MEDICAL CENTER | Age: 64
End: 2019-04-11

## 2019-05-22 ENCOUNTER — ANESTHESIA EVENT (OUTPATIENT)
Dept: GASTROENTEROLOGY | Facility: AMBULARY SURGERY CENTER | Age: 64
End: 2019-05-22

## 2019-06-04 RX ORDER — SODIUM CHLORIDE, SODIUM LACTATE, POTASSIUM CHLORIDE, CALCIUM CHLORIDE 600; 310; 30; 20 MG/100ML; MG/100ML; MG/100ML; MG/100ML
125 INJECTION, SOLUTION INTRAVENOUS CONTINUOUS
Status: CANCELLED | OUTPATIENT
Start: 2019-06-04

## 2019-06-04 RX ORDER — HYDROMORPHONE HCL/PF 1 MG/ML
0.5 SYRINGE (ML) INJECTION
Status: CANCELLED | OUTPATIENT
Start: 2019-06-04

## 2019-06-04 RX ORDER — PROMETHAZINE HYDROCHLORIDE 25 MG/ML
12.5 INJECTION, SOLUTION INTRAMUSCULAR; INTRAVENOUS ONCE AS NEEDED
Status: CANCELLED | OUTPATIENT
Start: 2019-06-04

## 2019-06-04 RX ORDER — LIDOCAINE HYDROCHLORIDE 10 MG/ML
0.5 INJECTION, SOLUTION EPIDURAL; INFILTRATION; INTRACAUDAL; PERINEURAL ONCE AS NEEDED
Status: CANCELLED | OUTPATIENT
Start: 2019-06-04

## 2019-06-04 RX ORDER — ALBUTEROL SULFATE 2.5 MG/3ML
2.5 SOLUTION RESPIRATORY (INHALATION) ONCE AS NEEDED
Status: CANCELLED | OUTPATIENT
Start: 2019-06-04

## 2019-06-04 RX ORDER — LABETALOL 20 MG/4 ML (5 MG/ML) INTRAVENOUS SYRINGE
5
Status: CANCELLED | OUTPATIENT
Start: 2019-06-04

## 2019-06-04 RX ORDER — ONDANSETRON 2 MG/ML
4 INJECTION INTRAMUSCULAR; INTRAVENOUS ONCE AS NEEDED
Status: CANCELLED | OUTPATIENT
Start: 2019-06-04

## 2019-06-04 RX ORDER — MEPERIDINE HYDROCHLORIDE 25 MG/ML
12.5 INJECTION INTRAMUSCULAR; INTRAVENOUS; SUBCUTANEOUS AS NEEDED
Status: CANCELLED | OUTPATIENT
Start: 2019-06-04

## 2019-06-04 RX ORDER — FENTANYL CITRATE/PF 50 MCG/ML
25 SYRINGE (ML) INJECTION
Status: CANCELLED | OUTPATIENT
Start: 2019-06-04

## 2019-06-05 ENCOUNTER — HOSPITAL ENCOUNTER (OUTPATIENT)
Dept: GASTROENTEROLOGY | Facility: AMBULARY SURGERY CENTER | Age: 64
Setting detail: OUTPATIENT SURGERY
Discharge: HOME | End: 2019-06-05
Admitting: INTERNAL MEDICINE
Payer: COMMERCIAL

## 2019-06-05 ENCOUNTER — ANESTHESIA (OUTPATIENT)
Dept: GASTROENTEROLOGY | Facility: AMBULARY SURGERY CENTER | Age: 64
End: 2019-06-05

## 2019-06-05 VITALS
HEART RATE: 75 BPM | BODY MASS INDEX: 30.07 KG/M2 | SYSTOLIC BLOOD PRESSURE: 113 MMHG | WEIGHT: 222 LBS | DIASTOLIC BLOOD PRESSURE: 70 MMHG | TEMPERATURE: 96.8 F | HEIGHT: 72 IN | OXYGEN SATURATION: 97 % | RESPIRATION RATE: 18 BRPM

## 2019-06-05 DIAGNOSIS — Z12.11 ENCOUNTER FOR SCREENING FOR MALIGNANT NEOPLASM OF COLON: ICD-10-CM

## 2019-06-05 PROCEDURE — 88305 TISSUE EXAM BY PATHOLOGIST: CPT | Performed by: PATHOLOGY

## 2019-06-05 PROCEDURE — 45385 COLONOSCOPY W/LESION REMOVAL: CPT | Performed by: INTERNAL MEDICINE

## 2019-06-05 RX ORDER — SODIUM CHLORIDE, SODIUM LACTATE, POTASSIUM CHLORIDE, CALCIUM CHLORIDE 600; 310; 30; 20 MG/100ML; MG/100ML; MG/100ML; MG/100ML
INJECTION, SOLUTION INTRAVENOUS CONTINUOUS PRN
Status: DISCONTINUED | OUTPATIENT
Start: 2019-06-05 | End: 2019-06-05 | Stop reason: SURG

## 2019-06-05 RX ORDER — LIDOCAINE HYDROCHLORIDE 10 MG/ML
INJECTION, SOLUTION INFILTRATION; PERINEURAL AS NEEDED
Status: DISCONTINUED | OUTPATIENT
Start: 2019-06-05 | End: 2019-06-05 | Stop reason: SURG

## 2019-06-05 RX ORDER — PROPOFOL 10 MG/ML
INJECTION, EMULSION INTRAVENOUS AS NEEDED
Status: DISCONTINUED | OUTPATIENT
Start: 2019-06-05 | End: 2019-06-05 | Stop reason: SURG

## 2019-06-05 RX ADMIN — LIDOCAINE HYDROCHLORIDE ANHYDROUS 50 MG: 10 INJECTION, SOLUTION INFILTRATION at 09:59

## 2019-06-05 RX ADMIN — PROPOFOL 100 MG: 10 INJECTION, EMULSION INTRAVENOUS at 09:59

## 2019-06-05 RX ADMIN — PROPOFOL 50 MG: 10 INJECTION, EMULSION INTRAVENOUS at 10:01

## 2019-06-05 RX ADMIN — SODIUM CHLORIDE, SODIUM LACTATE, POTASSIUM CHLORIDE, AND CALCIUM CHLORIDE: .6; .31; .03; .02 INJECTION, SOLUTION INTRAVENOUS at 09:52

## 2019-06-05 RX ADMIN — PROPOFOL 50 MG: 10 INJECTION, EMULSION INTRAVENOUS at 10:08

## 2019-06-05 RX ADMIN — PROPOFOL 25 MG: 10 INJECTION, EMULSION INTRAVENOUS at 10:04

## 2019-06-18 ENCOUNTER — OFFICE VISIT (OUTPATIENT)
Dept: NEUROLOGY | Facility: CLINIC | Age: 64
End: 2019-06-18
Payer: COMMERCIAL

## 2019-06-18 VITALS
BODY MASS INDEX: 30.92 KG/M2 | WEIGHT: 228.3 LBS | HEART RATE: 76 BPM | DIASTOLIC BLOOD PRESSURE: 68 MMHG | SYSTOLIC BLOOD PRESSURE: 118 MMHG | HEIGHT: 72 IN

## 2019-06-18 DIAGNOSIS — M54.16 RADICULOPATHY, LUMBAR REGION: ICD-10-CM

## 2019-06-18 DIAGNOSIS — M47.812 SPONDYLOSIS OF CERVICAL REGION WITHOUT MYELOPATHY OR RADICULOPATHY: Primary | ICD-10-CM

## 2019-06-18 DIAGNOSIS — M54.12 CERVICAL RADICULITIS: ICD-10-CM

## 2019-06-18 PROCEDURE — 99214 OFFICE O/P EST MOD 30 MIN: CPT | Performed by: PSYCHIATRY & NEUROLOGY

## 2019-06-19 ENCOUNTER — TELEPHONE (OUTPATIENT)
Dept: GASTROENTEROLOGY | Facility: AMBULARY SURGERY CENTER | Age: 64
End: 2019-06-19

## 2019-07-01 ENCOUNTER — OFFICE VISIT (OUTPATIENT)
Dept: INTERNAL MEDICINE CLINIC | Facility: CLINIC | Age: 64
End: 2019-07-01
Payer: COMMERCIAL

## 2019-07-01 VITALS
BODY MASS INDEX: 31 KG/M2 | HEART RATE: 77 BPM | DIASTOLIC BLOOD PRESSURE: 78 MMHG | WEIGHT: 228.9 LBS | HEIGHT: 72 IN | SYSTOLIC BLOOD PRESSURE: 126 MMHG | OXYGEN SATURATION: 97 %

## 2019-07-01 DIAGNOSIS — G47.19 EXCESSIVE DAYTIME SLEEPINESS: ICD-10-CM

## 2019-07-01 DIAGNOSIS — E78.2 MIXED HYPERLIPIDEMIA: ICD-10-CM

## 2019-07-01 DIAGNOSIS — Z13.21 ENCOUNTER FOR SCREENING FOR NUTRITIONAL DISORDER: ICD-10-CM

## 2019-07-01 DIAGNOSIS — R42 LIGHTHEADEDNESS: Primary | ICD-10-CM

## 2019-07-01 PROCEDURE — 99214 OFFICE O/P EST MOD 30 MIN: CPT | Performed by: NURSE PRACTITIONER

## 2019-07-01 NOTE — PROGRESS NOTES
Assessment/Plan:    Excessive daytime sleepiness  Sleep study ordered    Hyperlipidemia  Counseled on low cholesterol diet  bloodwork ordered    Lightheadedness  Cardiac testing ordered  bloodwork ordered  Go to ER if worsening       Diagnoses and all orders for this visit:    Lightheadedness  -     TSH, 3rd generation with Free T4 reflex; Future  -     Cancel: Echo complete with contrast if indicated; Future  -     VAS carotid complete study; Future  -     Echo stress test w contrast if indicated; Future    Mixed hyperlipidemia  -     CBC and differential; Future  -     Comprehensive metabolic panel; Future  -     Lipid panel; Future    Encounter for screening for nutritional disorder  -     Vitamin D 25 hydroxy; Future    Excessive daytime sleepiness  -     Multiple sleep latency test with diagnostic study; Future          Subjective:      Patient ID: Wanda Brewster is a 59 y o  male  Patient is here for lightheadedness for a year when getting up and doing elliptical  No chest pain no sweating    Sees "blue" when he exercises too much at [employer]  Does not smoke  No cardiac history      He also co daytime sleepiness  No trouble falling asleep  Denies choking  Denies snoring      The following portions of the patient's history were reviewed and updated as appropriate: allergies, current medications, past family history, past medical history, past social history, past surgical history and problem list     Review of Systems   Constitutional: Negative  HENT: Negative  Eyes: Negative  Respiratory: Negative  Cardiovascular: Negative  Gastrointestinal: Negative  Musculoskeletal: Negative  Neurological: Negative  Objective:      /78   Pulse 77   Ht 6' (1 829 m)   Wt 104 kg (228 lb 14 4 oz)   SpO2 97%   BMI 31 04 kg/m²          Physical Exam   Constitutional: He is oriented to person, place, and time  He appears well-developed and well-nourished     HENT:   Head: Normocephalic and atraumatic  Right Ear: External ear normal    Left Ear: External ear normal    Nose: Nose normal    Mouth/Throat: Oropharynx is clear and moist    Eyes: Pupils are equal, round, and reactive to light  Conjunctivae are normal    Neck: Normal range of motion  Neck supple  Cardiovascular: Normal rate and regular rhythm  Pulmonary/Chest: Effort normal and breath sounds normal    Abdominal: Soft  Bowel sounds are normal    Musculoskeletal: Normal range of motion  Neurological: He is alert and oriented to person, place, and time  Skin: Skin is warm and dry  Nursing note and vitals reviewed

## 2019-07-24 ENCOUNTER — HOSPITAL ENCOUNTER (OUTPATIENT)
Dept: NON INVASIVE DIAGNOSTICS | Facility: CLINIC | Age: 64
Discharge: HOME | End: 2019-07-24
Payer: COMMERCIAL

## 2019-07-24 DIAGNOSIS — R42 LIGHTHEADEDNESS: ICD-10-CM

## 2019-07-24 PROCEDURE — 93880 EXTRACRANIAL BILAT STUDY: CPT

## 2019-07-24 PROCEDURE — 93880 EXTRACRANIAL BILAT STUDY: CPT | Performed by: SURGERY

## 2019-08-20 DIAGNOSIS — L70.9 ACNE, UNSPECIFIED ACNE TYPE: ICD-10-CM

## 2019-08-20 RX ORDER — ADAPALENE AND BENZOYL PEROXIDE 3; 25 MG/G; MG/G
1 GEL TOPICAL DAILY
Qty: 60 G | Refills: 0 | Status: SHIPPED | OUTPATIENT
Start: 2019-08-20 | End: 2020-01-21

## 2019-09-04 ENCOUNTER — HOSPITAL ENCOUNTER (OUTPATIENT)
Dept: NON INVASIVE DIAGNOSTICS | Facility: HOSPITAL | Age: 64
Discharge: HOME | End: 2019-09-04
Payer: COMMERCIAL

## 2019-09-04 DIAGNOSIS — R42 LIGHTHEADEDNESS: ICD-10-CM

## 2019-09-04 LAB
CHEST PAIN STATEMENT: NORMAL
MAX DIASTOLIC BP: 80 MMHG
MAX HEART RATE: 157 BPM
MAX PREDICTED HEART RATE: 157 BPM
MAX. SYSTOLIC BP: 164 MMHG
PROTOCOL NAME: NORMAL
REASON FOR TERMINATION: NORMAL
TARGET HR FORMULA: NORMAL
TIME IN EXERCISE PHASE: NORMAL

## 2019-09-04 PROCEDURE — 93351 STRESS TTE COMPLETE: CPT | Performed by: INTERNAL MEDICINE

## 2019-09-04 PROCEDURE — 93350 STRESS TTE ONLY: CPT

## 2019-09-05 ENCOUNTER — IMMUNIZATIONS (OUTPATIENT)
Dept: INTERNAL MEDICINE CLINIC | Facility: CLINIC | Age: 64
End: 2019-09-05
Payer: COMMERCIAL

## 2019-09-05 DIAGNOSIS — Z23 NEED FOR VACCINATION: Primary | ICD-10-CM

## 2019-09-05 DIAGNOSIS — R79.89 ELEVATED TSH: Primary | ICD-10-CM

## 2019-09-05 DIAGNOSIS — E03.9 HYPOTHYROIDISM, UNSPECIFIED TYPE: Primary | ICD-10-CM

## 2019-09-05 LAB
25(OH)D3 SERPL-MCNC: 32 NG/ML (ref 30–100)
ALBUMIN SERPL-MCNC: 4.1 G/DL (ref 3.6–5.1)
ALBUMIN/GLOB SERPL: 1.8 (CALC) (ref 1–2.5)
ALP SERPL-CCNC: 66 U/L (ref 40–115)
ALT SERPL-CCNC: 11 U/L (ref 9–46)
AST SERPL-CCNC: 15 U/L (ref 10–35)
BASOPHILS # BLD AUTO: 33 CELLS/UL (ref 0–200)
BASOPHILS NFR BLD AUTO: 0.5 %
BILIRUB SERPL-MCNC: 0.5 MG/DL (ref 0.2–1.2)
BUN SERPL-MCNC: 15 MG/DL (ref 7–25)
BUN/CREAT SERPL: 12 (CALC) (ref 6–22)
CALCIUM SERPL-MCNC: 9.3 MG/DL (ref 8.6–10.3)
CHLORIDE SERPL-SCNC: 106 MMOL/L (ref 98–110)
CHOLEST SERPL-MCNC: 201 MG/DL
CHOLEST/HDLC SERPL: 4.9 (CALC)
CO2 SERPL-SCNC: 29 MMOL/L (ref 20–32)
CREAT SERPL-MCNC: 1.27 MG/DL (ref 0.7–1.25)
EOSINOPHIL # BLD AUTO: 143 CELLS/UL (ref 15–500)
EOSINOPHIL NFR BLD AUTO: 2.2 %
ERYTHROCYTE [DISTWIDTH] IN BLOOD BY AUTOMATED COUNT: 13.2 % (ref 11–15)
GLOBULIN SER CALC-MCNC: 2.3 G/DL (CALC) (ref 1.9–3.7)
GLUCOSE SERPL-MCNC: 86 MG/DL (ref 65–99)
HCT VFR BLD AUTO: 45.5 % (ref 38.5–50)
HDLC SERPL-MCNC: 41 MG/DL
HGB BLD-MCNC: 15.1 G/DL (ref 13.2–17.1)
LDLC SERPL CALC-MCNC: 128 MG/DL (CALC)
LYMPHOCYTES # BLD AUTO: 2464 CELLS/UL (ref 850–3900)
LYMPHOCYTES NFR BLD AUTO: 37.9 %
MCH RBC QN AUTO: 29.5 PG (ref 27–33)
MCHC RBC AUTO-ENTMCNC: 33.2 G/DL (ref 32–36)
MCV RBC AUTO: 88.9 FL (ref 80–100)
MONOCYTES # BLD AUTO: 676 CELLS/UL (ref 200–950)
MONOCYTES NFR BLD AUTO: 10.4 %
NEUTROPHILS # BLD AUTO: 3185 CELLS/UL (ref 1500–7800)
NEUTROPHILS NFR BLD AUTO: 49 %
NONHDLC SERPL-MCNC: 160 MG/DL (CALC)
PLATELET # BLD AUTO: 246 THOUSAND/UL (ref 140–400)
PMV BLD REES-ECKER: 9.6 FL (ref 7.5–12.5)
POTASSIUM SERPL-SCNC: 4.3 MMOL/L (ref 3.5–5.3)
PROT SERPL-MCNC: 6.4 G/DL (ref 6.1–8.1)
RBC # BLD AUTO: 5.12 MILLION/UL (ref 4.2–5.8)
SL AMB EGFR AFRICAN AMERICAN: 69 ML/MIN/1.73M2
SL AMB EGFR NON AFRICAN AMERICAN: 60 ML/MIN/1.73M2
SODIUM SERPL-SCNC: 140 MMOL/L (ref 135–146)
T4 FREE SERPL-MCNC: 1.1 NG/DL (ref 0.8–1.8)
TRIGL SERPL-MCNC: 188 MG/DL
TSH SERPL-ACNC: 5.18 MIU/L (ref 0.4–4.5)
WBC # BLD AUTO: 6.5 THOUSAND/UL (ref 3.8–10.8)

## 2019-09-05 PROCEDURE — 90471 IMMUNIZATION ADMIN: CPT

## 2019-09-05 PROCEDURE — 90682 RIV4 VACC RECOMBINANT DNA IM: CPT

## 2019-09-05 RX ORDER — LEVOTHYROXINE SODIUM 0.03 MG/1
25 TABLET ORAL DAILY
Qty: 30 TABLET | Refills: 2 | Status: SHIPPED | OUTPATIENT
Start: 2019-09-05 | End: 2019-11-27 | Stop reason: SDUPTHER

## 2019-09-09 DIAGNOSIS — N17.9 AKI (ACUTE KIDNEY INJURY) (HCC): Primary | ICD-10-CM

## 2019-09-20 LAB
BUN SERPL-MCNC: 15 MG/DL (ref 7–25)
BUN/CREAT SERPL: NORMAL (CALC) (ref 6–22)
CALCIUM SERPL-MCNC: 9.6 MG/DL (ref 8.6–10.3)
CHLORIDE SERPL-SCNC: 104 MMOL/L (ref 98–110)
CO2 SERPL-SCNC: 31 MMOL/L (ref 20–32)
CREAT SERPL-MCNC: 1.13 MG/DL (ref 0.7–1.25)
GLUCOSE SERPL-MCNC: 87 MG/DL (ref 65–99)
POTASSIUM SERPL-SCNC: 4.3 MMOL/L (ref 3.5–5.3)
SL AMB EGFR AFRICAN AMERICAN: 80 ML/MIN/1.73M2
SL AMB EGFR NON AFRICAN AMERICAN: 69 ML/MIN/1.73M2
SODIUM SERPL-SCNC: 140 MMOL/L (ref 135–146)

## 2019-09-24 ENCOUNTER — TRANSCRIBE ORDERS (OUTPATIENT)
Dept: SLEEP CENTER | Facility: CLINIC | Age: 64
End: 2019-09-24

## 2019-09-24 DIAGNOSIS — G47.19 EXCESSIVE DAYTIME SLEEPINESS: Primary | ICD-10-CM

## 2019-09-25 ENCOUNTER — TELEPHONE (OUTPATIENT)
Dept: INTERNAL MEDICINE CLINIC | Facility: CLINIC | Age: 64
End: 2019-09-25

## 2019-09-25 DIAGNOSIS — N17.9 AKI (ACUTE KIDNEY INJURY) (HCC): Primary | ICD-10-CM

## 2019-09-25 NOTE — TELEPHONE ENCOUNTER
Name and  verified      Chief Complaint   Patient presents with    Hypertension     f/u    Diabetes     f/u         Health Maintenance reviewed-discussed with patient. Patient educated on the parts of a diabetes care plan  1. Meal plan  2. Plan for staying active  3. Diabetes medicine plan  4. Plan for checking blood sugar as ordered by Dr. Tiffanie Gilliam  5. Schedule for diabetes follow up appt as recommended by provider      1. Have you been to the ER, urgent care clinic since your last visit? Hospitalized since your last visit? No    2. Have you seen or consulted any other health care providers outside of the [address] [place] [address] since your last visit? Include any pap smears or colon screening. No      Patient No Retinal Scan in office Today. Patient stated will schedule eye exam. Medical release form given. Please advise when labs are entered  Thank you!

## 2019-09-25 NOTE — TELEPHONE ENCOUNTER
Patient is asking to speak with you directly in regards to his recent blood work  He has questions in regards to his kidney levels      Please advise

## 2019-09-25 NOTE — TELEPHONE ENCOUNTER
Spoke to patient will monitor his kidney, please mail new bloodwork scripts to his home  I will enter now

## 2019-09-29 ENCOUNTER — HOSPITAL ENCOUNTER (OUTPATIENT)
Dept: SLEEP CENTER | Facility: CLINIC | Age: 64
Discharge: HOME | End: 2019-09-29
Payer: COMMERCIAL

## 2019-09-29 DIAGNOSIS — G47.19 EXCESSIVE DAYTIME SLEEPINESS: ICD-10-CM

## 2019-09-29 PROCEDURE — 95810 POLYSOM 6/> YRS 4/> PARAM: CPT

## 2019-09-30 NOTE — PROGRESS NOTES
Sleep Study Documentation    Pre-Sleep Study       Sleep testing procedure explained to patient:YES    Patient napped prior to study:NO    Caffeine:Dayshift worker after 12PM   Caffeine use:YES- coffee  6 ounces    Alcohol:Dayshift workers after 5PM: Alcohol use:NO    Typical day for patient:YES       Study Documentation    Sleep Study Indications: E D S, Lightheadedness    Sleep Study: Diagnostic   Snore:Mild  Supplemental O2: no    O2 flow rate (L/min) range 0  O2 flow rate (L/min) final 0  Minimum SaO2 84  Baseline SaO2 95            EKG abnormalities: yes:  EPOCH example and comments:, 497, 562     EEG abnormalities: yes:  EPOCH example and comments    Sleep Study Recorded < 2 hours: N/A    Sleep Study Recorded > 2 hours but incomplete study: N/A    Sleep Study Recorded 6 hours but no sleep obtained: NO    Patient classification: retired       Post-Sleep Study    Medication used at bedtime or during sleep study:YES prescription sleep aid and other prescription medications    Patient reports time it took to fall asleep:20 to 30 minutes    Patient reports waking up during study:Denied    Patient reports sleeping 6 to 8 hours with dreaming  Patient reports sleep during study:better than usual    Patient rated sleepiness: Not sleepy or tired    PAP treatment:no

## 2019-10-02 ENCOUNTER — TELEPHONE (OUTPATIENT)
Dept: SLEEP CENTER | Facility: CLINIC | Age: 64
End: 2019-10-02

## 2019-10-02 DIAGNOSIS — G47.33 OSA (OBSTRUCTIVE SLEEP APNEA): Primary | ICD-10-CM

## 2019-10-02 NOTE — TELEPHONE ENCOUNTER
----- Message from Esequiel Barnett MD sent at 10/2/2019  8:44 AM EDT -----  APAP 4 to 20 cm    Thanks

## 2019-10-02 NOTE — TELEPHONE ENCOUNTER
I spoke with patient, advised sleep study reveals mild MARIA, recommend treatment with APAP  Scheduled consult with Dr Latasha Singleton 11/13/19 at [phone] and DME set up at 1300      Paperwork to [employer]

## 2019-10-14 ENCOUNTER — TELEPHONE (OUTPATIENT)
Dept: SLEEP CENTER | Facility: CLINIC | Age: 64
End: 2019-10-14

## 2019-10-14 NOTE — TELEPHONE ENCOUNTER
----- Message from Antoinette Martínez MD sent at 10/13/2019  9:18 PM EDT -----  Approved  ----- Message -----  From: Jumana Roach  Sent: 9/25/2019  11:02 AM EDT  To: Sleep Medicine [facility] [place], #    This sleep study needs approval      If approved please sign and return to clerical pool  If denied please include reasons why  Also provide alternative testing if warranted  Please sign and return to clerical pool

## 2019-10-17 ENCOUNTER — TELEPHONE (OUTPATIENT)
Dept: NEUROLOGY | Facility: CLINIC | Age: 64
End: 2019-10-17

## 2019-10-17 LAB — TSH SERPL-ACNC: 2.29 MIU/L (ref 0.4–4.5)

## 2019-10-17 NOTE — TELEPHONE ENCOUNTER
Called and left message for patient stating that Dr Herber Dinero is out of the office on 10/29  If the patient calls back please assist in rescheduling their appointment

## 2019-10-22 ENCOUNTER — TELEPHONE (OUTPATIENT)
Dept: NEUROLOGY | Facility: CLINIC | Age: 64
End: 2019-10-22

## 2019-10-22 NOTE — TELEPHONE ENCOUNTER
Called and left message for patient stating that Nina Cuello will not be in the office the day of his appointment 10/29  If patient calls back please offer him 11/7 @ 12:30

## 2019-10-28 NOTE — TELEPHONE ENCOUNTER
Spoke with patient  Not able to make 11/7 or 11/14  Spoke with Laurie and she offered patient an appointment on 12/5 @ 12:00 with Dr Sridhar Trujillo  Confirmed date and time with patient and Nessa Denny will have AF reschedule this appt

## 2019-11-01 DIAGNOSIS — E03.9 HYPOTHYROIDISM, UNSPECIFIED TYPE: ICD-10-CM

## 2019-11-01 RX ORDER — LEVOTHYROXINE SODIUM 0.03 MG/1
25 TABLET ORAL DAILY
Qty: 90 TABLET | Refills: 1 | Status: CANCELLED
Start: 2019-11-01

## 2019-11-13 ENCOUNTER — OFFICE VISIT (OUTPATIENT)
Dept: SLEEP CENTER | Facility: CLINIC | Age: 64
End: 2019-11-13
Payer: COMMERCIAL

## 2019-11-13 VITALS
WEIGHT: 237 LBS | HEART RATE: 72 BPM | BODY MASS INDEX: 32.1 KG/M2 | HEIGHT: 72 IN | SYSTOLIC BLOOD PRESSURE: 122 MMHG | DIASTOLIC BLOOD PRESSURE: 74 MMHG

## 2019-11-13 DIAGNOSIS — G47.33 OSA (OBSTRUCTIVE SLEEP APNEA): Primary | ICD-10-CM

## 2019-11-13 PROCEDURE — 99213 OFFICE O/P EST LOW 20 MIN: CPT | Performed by: INTERNAL MEDICINE

## 2019-11-13 NOTE — PROGRESS NOTES
Consultation - [id number]  [facility] : 1955  MRN: [id number]      Assessment:  The patient has mild obstructive sleep apnea and is undergoing set up with APAP 4 to 20 cm  He is interested in alternatives including the Inspire as well as a mandibular advancement device  Depending on his clinical improvement, I May recommend an alternative therapy  Plan:  APAP 4 to 20 cm    Follow up:  Compliance check    History of Present Illness:   62 y o male with a history of snoring and witnessed apneas  He complains of excessive daytime sleepiness  He routinely awakens between 330 and 4:00 a m  And has a difficult time falling back to sleep  He takes Ambien 10 mg on a regular basis  His daytime sleepiness has been worsening over the last two years  He denies any cardiovascular disease  He is a restless sleeper due to musculoskeletal pain  He denies restless legs  He denies that his breathing awakens him from sleep        Review of Systems      Genitourinary none   Cardiology none   Gastrointestinal none   Neurology frequent headaches, awaken with headache, numbness/tingling of an extremity, forgetfulness, poor concentration or confusion,  and difficulty with memory   Constitutional none   Integumentary none   Psychiatry mood change   Musculoskeletal joint pain and legs twitching/jerking   Pulmonary snoring   ENT throat clearing and ringing in ears   Endocrine none   Hematological none         I have reviewed and updated the review of systems as necessary    Historical Information    Past Medical History:  Radiculopathy, spondylosis, anxiety    Family History: non-contributory    Social History     Socioeconomic History    Marital status: /Civil Union     Spouse name: None    Number of children: None    Years of education: None    Highest education level: None   Occupational History    None   Social Needs    Financial resource strain: None    Food insecurity:     Worry: None Inability: None    Transportation needs:     Medical: None     Non-medical: None   Tobacco Use    Smoking status: Former Smoker     Last attempt to quit: 1988     Years since quittin 4    Smokeless tobacco: Former User   Substance and Sexual Activity    Alcohol use:  Yes     Alcohol/week: 1 0 standard drinks     Types: 1 Cans of beer per week     Comment: social    Drug use: No    Sexual activity: None     Comment: no comment   Lifestyle    Physical activity:     Days per week: None     Minutes per session: None    Stress: None   Relationships    Social connections:     Talks on phone: None     Gets together: None     Attends Yazidism service: None     Active member of club or organization: None     Attends meetings of clubs or organizations: None     Relationship status: None    Intimate partner violence:     Fear of current or ex partner: None     Emotionally abused: None     Physically abused: None     Forced sexual activity: None   Other Topics Concern    None   Social History Narrative    Daily caffeine consumption         Sleep Schedule: unremarkable    Snoring:  Yes    Witnessed Apnea:  Yes    Medications/Allergies:    Current Outpatient Medications:     Adapalene-Benzoyl Peroxide (EPIDUO FORTE) 0 3-2 5 % GEL, Apply 1 application topically daily, Disp: 60 g, Rfl: 0    ALPRAZolam (XANAX) 1 mg tablet, Take 30mins before flying and dental visits, Disp: 15 tablet, Rfl: 0    levothyroxine 25 mcg tablet, Take 1 tablet (25 mcg total) by mouth daily, Disp: 30 tablet, Rfl: 2    tadalafil (CIALIS) 5 MG tablet, Take 1 tablet by mouth daily for symptoms of BPH, Disp: 90 tablet, Rfl: 3    zolpidem (AMBIEN) 10 mg tablet, Take 1 tablet (10 mg total) by mouth daily at bedtime as needed for sleep, Disp: 30 tablet, Rfl: 0    diclofenac potassium (CATAFLAM) 50 mg tablet, Take by mouth daily as needed  , Disp: , Rfl:     gabapentin (NEURONTIN) 300 mg capsule, Take 1 capsule (300 mg total) by mouth daily at bedtime (Patient not taking: Reported on 11/13/2019), Disp: 30 capsule, Rfl: 11    Na Sulfate-K Sulfate-Mg Sulf 17 5-3 13-1 6 GM/177ML SOLN, Take 1 kit by mouth once for 1 dose (Patient not taking: Reported on 11/13/2019), Disp: 2 Bottle, Rfl: 0        No notes on file                  Objective:    Vital Signs:   Vitals:    11/13/19 1200   BP: 122/74   Pulse: 72   Weight: 108 kg (237 lb)   Height: 6' (1 829 m)     Neck Circumference: 16      [place] Sleepiness Scale: Total score: 7    Physical Exam:    General: Alert, appropriate, cooperative, overweight    Head: NC/AT, moderate retrognathia    Nose:  Moderate septal deviation, nares partially obstructed, mucosa normal    Throat: Airway diminished, tongue base slightly thickened, no tonsils visualized    Neck: Normal, no thyromegaly or lymphadenopathy, no JVD    Heart: RR, normal S1 and S2, no murmurs, PMI on the right    Chest: Clear bilaterally    Extremity: No clubbing, cyanosis, no edema    Skin: Warm, dry    Neuro: No motor abnormalities, cranial nerves appear intact    Sleep Study Results:   AHI = 12 1  PAP Pressure: Auto PAP: 4 to 20 cm  DME Provider: [employer] [facility]    Counseling / Coordination of Care  A description of the counseling / coordination of care: We discussed the pathophysiology of obstructive sleep apnea as well as the possible treatment options  We also discussed the rationale for positive airway pressure therapy  Board Certified Sleep Specialist    Portions of the record may have been created with voice recognition software  Occasional wrong word or "sound a like" substitutions may have occurred due to the inherent limitations of voice recognition software  Read the chart carefully and recognize, using context, where substitutions have occurred

## 2019-11-27 DIAGNOSIS — E03.9 HYPOTHYROIDISM, UNSPECIFIED TYPE: ICD-10-CM

## 2019-11-27 RX ORDER — LEVOTHYROXINE SODIUM 0.03 MG/1
25 TABLET ORAL DAILY
Qty: 30 TABLET | Refills: 2 | Status: SHIPPED | OUTPATIENT
Start: 2019-11-27 | End: 2020-01-21 | Stop reason: SDUPTHER

## 2019-11-27 RX ORDER — LEVOTHYROXINE SODIUM 0.03 MG/1
TABLET ORAL
Qty: 30 TABLET | Refills: 2 | Status: SHIPPED | OUTPATIENT
Start: 2019-11-27 | End: 2019-11-27 | Stop reason: SDUPTHER

## 2019-11-27 NOTE — TELEPHONE ENCOUNTER
Can you please authorize? Dr Chayito Connell sent the script but the pharmacy can not accept it  She is not yet credentialed to do [employer]  Thank you!

## 2019-12-31 LAB
ALBUMIN SERPL-MCNC: 4.3 G/DL (ref 3.6–5.1)
ALBUMIN/GLOB SERPL: 2 (CALC) (ref 1–2.5)
ALP SERPL-CCNC: 71 U/L (ref 40–115)
ALT SERPL-CCNC: 10 U/L (ref 9–46)
AST SERPL-CCNC: 17 U/L (ref 10–35)
BASOPHILS # BLD AUTO: 19 CELLS/UL (ref 0–200)
BASOPHILS NFR BLD AUTO: 0.3 %
BILIRUB SERPL-MCNC: 0.4 MG/DL (ref 0.2–1.2)
BUN SERPL-MCNC: 16 MG/DL (ref 7–25)
BUN/CREAT SERPL: NORMAL (CALC) (ref 6–22)
CALCIUM SERPL-MCNC: 9.4 MG/DL (ref 8.6–10.3)
CHLORIDE SERPL-SCNC: 103 MMOL/L (ref 98–110)
CHOLEST SERPL-MCNC: 196 MG/DL
CHOLEST/HDLC SERPL: 4.6 (CALC)
CO2 SERPL-SCNC: 28 MMOL/L (ref 20–32)
CREAT SERPL-MCNC: 1.15 MG/DL (ref 0.7–1.25)
EOSINOPHIL # BLD AUTO: 120 CELLS/UL (ref 15–500)
EOSINOPHIL NFR BLD AUTO: 1.9 %
ERYTHROCYTE [DISTWIDTH] IN BLOOD BY AUTOMATED COUNT: 12.8 % (ref 11–15)
GLOBULIN SER CALC-MCNC: 2.2 G/DL (CALC) (ref 1.9–3.7)
GLUCOSE SERPL-MCNC: 84 MG/DL (ref 65–99)
HCT VFR BLD AUTO: 44.3 % (ref 38.5–50)
HDLC SERPL-MCNC: 43 MG/DL
HGB BLD-MCNC: 15 G/DL (ref 13.2–17.1)
LDLC SERPL CALC-MCNC: 128 MG/DL (CALC)
LYMPHOCYTES # BLD AUTO: 2224 CELLS/UL (ref 850–3900)
LYMPHOCYTES NFR BLD AUTO: 35.3 %
MCH RBC QN AUTO: 29.5 PG (ref 27–33)
MCHC RBC AUTO-ENTMCNC: 33.9 G/DL (ref 32–36)
MCV RBC AUTO: 87.2 FL (ref 80–100)
MONOCYTES # BLD AUTO: 756 CELLS/UL (ref 200–950)
MONOCYTES NFR BLD AUTO: 12 %
NEUTROPHILS # BLD AUTO: 3182 CELLS/UL (ref 1500–7800)
NEUTROPHILS NFR BLD AUTO: 50.5 %
NONHDLC SERPL-MCNC: 153 MG/DL (CALC)
PLATELET # BLD AUTO: 241 THOUSAND/UL (ref 140–400)
PMV BLD REES-ECKER: 9.5 FL (ref 7.5–12.5)
POTASSIUM SERPL-SCNC: 4.3 MMOL/L (ref 3.5–5.3)
PROT SERPL-MCNC: 6.5 G/DL (ref 6.1–8.1)
PSA SERPL-MCNC: 1.3 NG/ML
RBC # BLD AUTO: 5.08 MILLION/UL (ref 4.2–5.8)
SL AMB EGFR AFRICAN AMERICAN: 78 ML/MIN/1.73M2
SL AMB EGFR NON AFRICAN AMERICAN: 67 ML/MIN/1.73M2
SODIUM SERPL-SCNC: 138 MMOL/L (ref 135–146)
TRIGL SERPL-MCNC: 133 MG/DL
WBC # BLD AUTO: 6.3 THOUSAND/UL (ref 3.8–10.8)

## 2020-01-07 DIAGNOSIS — G47.33 OSA (OBSTRUCTIVE SLEEP APNEA): Primary | ICD-10-CM

## 2020-01-08 ENCOUNTER — TELEPHONE (OUTPATIENT)
Dept: SLEEP CENTER | Facility: CLINIC | Age: 65
End: 2020-01-08

## 2020-01-08 NOTE — TELEPHONE ENCOUNTER
Received a script for a pressure change  Changed accordingly  Patient's now set to a pressure of 7-20cm  Patient is aware of the changes

## 2020-01-21 ENCOUNTER — OFFICE VISIT (OUTPATIENT)
Dept: INTERNAL MEDICINE CLINIC | Facility: CLINIC | Age: 65
End: 2020-01-21
Payer: COMMERCIAL

## 2020-01-21 VITALS
BODY MASS INDEX: 31.69 KG/M2 | HEART RATE: 84 BPM | HEIGHT: 72 IN | RESPIRATION RATE: 16 BRPM | DIASTOLIC BLOOD PRESSURE: 74 MMHG | OXYGEN SATURATION: 99 % | WEIGHT: 234 LBS | SYSTOLIC BLOOD PRESSURE: 120 MMHG

## 2020-01-21 DIAGNOSIS — G47.33 OBSTRUCTIVE SLEEP APNEA: ICD-10-CM

## 2020-01-21 DIAGNOSIS — L71.9 ROSACEA: ICD-10-CM

## 2020-01-21 DIAGNOSIS — G47.00 INSOMNIA, UNSPECIFIED TYPE: ICD-10-CM

## 2020-01-21 DIAGNOSIS — Z00.00 WELLNESS EXAMINATION: Primary | ICD-10-CM

## 2020-01-21 DIAGNOSIS — E78.2 MIXED HYPERLIPIDEMIA: ICD-10-CM

## 2020-01-21 DIAGNOSIS — E03.9 HYPOTHYROIDISM, UNSPECIFIED TYPE: ICD-10-CM

## 2020-01-21 DIAGNOSIS — F41.9 ANXIETY: ICD-10-CM

## 2020-01-21 PROBLEM — R42 LIGHTHEADEDNESS: Status: RESOLVED | Noted: 2019-07-01 | Resolved: 2020-01-21

## 2020-01-21 PROBLEM — G47.19 EXCESSIVE DAYTIME SLEEPINESS: Status: RESOLVED | Noted: 2019-07-01 | Resolved: 2020-01-21

## 2020-01-21 PROBLEM — Z12.11 SCREEN FOR COLON CANCER: Status: RESOLVED | Noted: 2019-04-10 | Resolved: 2020-01-21

## 2020-01-21 PROCEDURE — 99396 PREV VISIT EST AGE 40-64: CPT | Performed by: INTERNAL MEDICINE

## 2020-01-21 RX ORDER — LEVOTHYROXINE SODIUM 0.03 MG/1
25 TABLET ORAL DAILY
Qty: 90 TABLET | Refills: 3 | Status: SHIPPED | OUTPATIENT
Start: 2020-01-21 | End: 2021-01-26

## 2020-01-21 RX ORDER — ZOLPIDEM TARTRATE 10 MG/1
10 TABLET ORAL
Qty: 30 TABLET | Refills: 0 | Status: SHIPPED | OUTPATIENT
Start: 2020-01-21 | End: 2021-01-26

## 2020-01-21 RX ORDER — METRONIDAZOLE 10 MG/G
GEL TOPICAL DAILY
Qty: 60 G | Refills: 0 | Status: SHIPPED | OUTPATIENT
Start: 2020-01-21 | End: 2022-05-20

## 2020-01-21 RX ORDER — ALPRAZOLAM 1 MG/1
TABLET ORAL
Qty: 15 TABLET | Refills: 0 | Status: SHIPPED | OUTPATIENT
Start: 2020-01-21 | End: 2020-04-20

## 2020-01-21 NOTE — PROGRESS NOTES
Assessment/Plan:    Hyperlipidemia  Declining statin therapy  Agreeable to CT calcium score    Obstructive sleep apnea  Compliant with CPAP    Anxiety  Very limited use of Xanax    Hypothyroidism  Continue low dose levothyroxine  Normal in October    Rosacea  He recalls trying metronidazole before and was effective  Epiduo too expensive but effective    Insomnia  Limited use of Ambien    BMI Counseling: Body mass index is 31 74 kg/m²  The BMI is above normal  Nutrition recommendations include reducing intake of cholesterol  Exercise recommendations include exercising 3-5 times per week  Problem List Items Addressed This Visit        Endocrine    Hypothyroidism     Continue low dose levothyroxine  Normal in October         Relevant Medications    levothyroxine 25 mcg tablet    Other Relevant Orders    TSH, 3rd generation with Free T4 reflex    CBC and differential    Comprehensive metabolic panel    Lipid panel    TSH, 3rd generation with Free T4 reflex       Respiratory    Obstructive sleep apnea     Compliant with CPAP            Musculoskeletal and Integument    Rosacea     He recalls trying metronidazole before and was effective  Epiduo too expensive but effective         Relevant Medications    metroNIDAZOLE (METROGEL) 1 % gel       Other    Hyperlipidemia     Declining statin therapy  Agreeable to CT calcium score         Relevant Orders    CT coronary calcium score    CBC and differential    Comprehensive metabolic panel    Lipid panel    TSH, 3rd generation with Free T4 reflex    Anxiety     Very limited use of Xanax         Relevant Medications    ALPRAZolam (XANAX) 1 mg tablet    Insomnia     Limited use of Ambien         Relevant Medications    zolpidem (AMBIEN) 10 mg tablet      Other Visit Diagnoses     Wellness examination    -  Primary            Subjective:      Patient ID: Martha Allen is a 62 y o  male      HPI  Here for a wellness  Up to date with metabolic screening  Up to date with PSA screen  Up to date with colon cancer screening-colonoscopy in June and 5 year repeat recommended  Regular diet  Now exercising regularly    The following portions of the patient's history were reviewed and updated as appropriate: allergies, past family history, past medical history, past social history, past surgical history and problem list     Review of Systems   Constitutional: Negative for fatigue, fever and unexpected weight change  HENT: Negative for congestion, sinus pressure, sinus pain and sore throat  Respiratory: Negative for cough, shortness of breath and wheezing  Cardiovascular: Negative for chest pain, palpitations and leg swelling  Gastrointestinal: Negative for abdominal pain, constipation, diarrhea, nausea and vomiting  Genitourinary: Positive for difficulty urinating (slow stream)  Musculoskeletal: Positive for back pain (chronic) and neck pain (chronic)  Negative for arthralgias and myalgias  Skin:        Redness, acne on nose   Neurological: Positive for light-headedness (when he gets up quickly)  Negative for dizziness and headaches  Objective:      /74 (BP Location: Left arm, Patient Position: Sitting, Cuff Size: Large)   Pulse 84   Resp 16   Ht 6' (1 829 m)   Wt 106 kg (234 lb)   SpO2 99%   BMI 31 74 kg/m²          Physical Exam   Constitutional: He is oriented to person, place, and time  He appears well-developed and well-nourished  HENT:   Head: Normocephalic and atraumatic  Right Ear: External ear normal    Left Ear: External ear normal    Mouth/Throat: Oropharynx is clear and moist    Eyes: Conjunctivae are normal    Neck: Neck supple  Cardiovascular: Normal rate, regular rhythm and normal heart sounds  No murmur heard  Pulmonary/Chest: Effort normal and breath sounds normal  No respiratory distress  He has no wheezes  He has no rales  Abdominal: Soft  He exhibits no distension and no mass  There is no tenderness   There is no rebound and no guarding  Neurological: He is alert and oriented to person, place, and time  Skin: Skin is warm and dry  There is erythema (nose)  Psychiatric: He has a normal mood and affect   His behavior is normal  Judgment and thought content normal

## 2020-01-22 PROBLEM — L71.9 ROSACEA: Status: ACTIVE | Noted: 2020-01-22

## 2020-01-22 PROBLEM — E03.9 HYPOTHYROIDISM: Status: ACTIVE | Noted: 2020-01-22

## 2020-01-22 PROBLEM — G47.00 INSOMNIA: Status: ACTIVE | Noted: 2020-01-22

## 2020-01-25 ENCOUNTER — HOSPITAL ENCOUNTER (OUTPATIENT)
Dept: CT IMAGING | Facility: HOSPITAL | Age: 65
Discharge: HOME | End: 2020-01-25
Payer: COMMERCIAL

## 2020-01-25 DIAGNOSIS — E78.2 MIXED HYPERLIPIDEMIA: ICD-10-CM

## 2020-01-29 ENCOUNTER — OFFICE VISIT (OUTPATIENT)
Dept: SLEEP CENTER | Facility: CLINIC | Age: 65
End: 2020-01-29
Payer: COMMERCIAL

## 2020-01-29 VITALS
BODY MASS INDEX: 32.1 KG/M2 | HEIGHT: 72 IN | HEART RATE: 80 BPM | SYSTOLIC BLOOD PRESSURE: 124 MMHG | DIASTOLIC BLOOD PRESSURE: 64 MMHG | WEIGHT: 237 LBS

## 2020-01-29 DIAGNOSIS — G47.33 OSA (OBSTRUCTIVE SLEEP APNEA): Primary | ICD-10-CM

## 2020-01-29 PROCEDURE — 99213 OFFICE O/P EST LOW 20 MIN: CPT | Performed by: INTERNAL MEDICINE

## 2020-01-29 NOTE — PROGRESS NOTES
Progress Note - [address]  Jose Dugan AAS:[birth year] MRN: [id number]      Reason for Visit:  62 y o male here for PAP compliance check    Assessment:  Doing well with new PAP device  Sleep quality is improved and the patient feels less drowsy  Compliance data show utilization for greater than or equal to 70% of nights, for greater than or equal to 4 hours per night  Plan:  Adequate compliance and successful treatment  Consider changing Ambien to Ambien CR, to address his sleep maintenance insomnia    Follow up: One year    History of Present Illness:  History of MARIA on PAP therapy  Meets adequate compliance          Review of Systems      Genitourinary none   Cardiology none   Gastrointestinal none   Neurology awaken with headache and numbness/tingling of an extremity   Constitutional none   Integumentary none   Psychiatry none   Musculoskeletal none   Pulmonary snoring   ENT ringing in ears   Endocrine none   Hematological none           I have reviewed and updated the review of systems as necessary    Historical Information    Past Medical History:   Diagnosis Date    Anxiety     BPH with obstruction/lower urinary tract symptoms     BPH with obstruction/lower urinary tract symptoms     Hypogonadism in male     Lightheadedness     last assessed-8/20/2015    Nocturia     Nocturia     Partial small bowel obstruction (HCC)     last assessed-4/14/2016    Shoulder injury, left, initial encounter     resolved:5/22/2017    Sixth cranial nerve palsy     last assessed-12/17/2015         Past Surgical History:   Procedure Laterality Date    NASAL SINUS SURGERY Bilateral     TONSILLECTOMY           Social History     Socioeconomic History    Marital status: /Civil Union     Spouse name: None    Number of children: None    Years of education: None    Highest education level: None   Occupational History    None   Social Needs    Financial resource strain: None    Food insecurity:     Worry: None     Inability: None    Transportation needs:     Medical: None     Non-medical: None   Tobacco Use    Smoking status: Former Smoker     Last attempt to quit: 1988     Years since quittin 6    Smokeless tobacco: Former User   Substance and Sexual Activity    Alcohol use:  Yes     Alcohol/week: 1 0 standard drinks     Types: 1 Cans of beer per week     Comment: social    Drug use: No    Sexual activity: None     Comment: no comment   Lifestyle    Physical activity:     Days per week: None     Minutes per session: None    Stress: None   Relationships    Social connections:     Talks on phone: None     Gets together: None     Attends Advent service: None     Active member of club or organization: None     Attends meetings of clubs or organizations: None     Relationship status: None    Intimate partner violence:     Fear of current or ex partner: None     Emotionally abused: None     Physically abused: None     Forced sexual activity: None   Other Topics Concern    None   Social History Narrative    Daily caffeine consumption         Family History   Problem Relation Age of Onset    Cancer Other     Diabetes Family     Other Other         back disorder       Medications/Allergies:      Current Outpatient Medications:     ALPRAZolam (XANAX) 1 mg tablet, Take 30mins before flying and dental visits, Disp: 15 tablet, Rfl: 0    diclofenac potassium (CATAFLAM) 50 mg tablet, Take by mouth daily as needed  , Disp: , Rfl:     levothyroxine 25 mcg tablet, Take 1 tablet (25 mcg total) by mouth daily, Disp: 90 tablet, Rfl: 3    metroNIDAZOLE (METROGEL) 1 % gel, Apply topically daily, Disp: 60 g, Rfl: 0    tadalafil (CIALIS) 5 MG tablet, Take 1 tablet by mouth daily for symptoms of BPH, Disp: 90 tablet, Rfl: 3    zolpidem (AMBIEN) 10 mg tablet, Take 1 tablet (10 mg total) by mouth daily at bedtime as needed for sleep, Disp: 30 tablet, Rfl: 0      Objective    Vital Signs:   Vitals:    20 1300   BP: 124/64   Pulse: 80     [place] Sleepiness Scale: Total score: 4        Physical Exam:    General: Alert, appropriate, cooperative, overweight    Head: NC/AT    Skin: Warm, dry    Neuro: No motor abnormalities, cranial nerves appear intact    Extremity: No clubbing, cyanosis    PAP setting:   APAP 7 to 20 cm  DME Provider: [facility]      Counseling / Coordination of Care  Total clinic time spent today 20 minutes  A description of the counseling / coordination of care: Maintain compliance  Discussed equipment  JAZZMINE Lewis    Board Certified Sleep Specialist

## 2020-01-30 ENCOUNTER — TELEPHONE (OUTPATIENT)
Dept: INTERNAL MEDICINE CLINIC | Facility: CLINIC | Age: 65
End: 2020-01-30

## 2020-02-04 ENCOUNTER — TELEPHONE (OUTPATIENT)
Dept: SLEEP CENTER | Facility: CLINIC | Age: 65
End: 2020-02-04

## 2020-03-10 ENCOUNTER — TELEPHONE (OUTPATIENT)
Dept: INTERNAL MEDICINE CLINIC | Facility: CLINIC | Age: 65
End: 2020-03-10

## 2020-03-10 NOTE — TELEPHONE ENCOUNTER
The patient still wants the vaccine  He wants to pay for it out of pocket  Can he have the vaccine? Please advise  Thank you

## 2020-03-10 NOTE — TELEPHONE ENCOUNTER
Pt wonders if he should get the pneumo shot  He said that if he would get the corona virus and it progressed to pneumonia, would that help  Please, advise

## 2020-03-10 NOTE — TELEPHONE ENCOUNTER
I understand his concern BUT there is no indication for him though to get it at this time even if he can pay for it

## 2020-03-10 NOTE — TELEPHONE ENCOUNTER
I left the message on VM but I am not sure he received the message as there was a lot of static on the line  If he calls back just give him the message

## 2020-03-13 DIAGNOSIS — N40.0 ENLARGED PROSTATE WITHOUT LOWER URINARY TRACT SYMPTOMS (LUTS): ICD-10-CM

## 2020-03-13 RX ORDER — TADALAFIL 5 MG/1
TABLET ORAL
Qty: 90 TABLET | Refills: 3 | Status: SHIPPED | OUTPATIENT
Start: 2020-03-13 | End: 2021-01-29

## 2020-03-14 ENCOUNTER — NURSE TRIAGE (OUTPATIENT)
Dept: OTHER | Facility: OTHER | Age: 65
End: 2020-03-14

## 2020-03-14 DIAGNOSIS — H00.011 HORDEOLUM EXTERNUM OF RIGHT UPPER EYELID: Primary | ICD-10-CM

## 2020-03-14 RX ORDER — ERYTHROMYCIN 5 MG/G
0.5 OINTMENT OPHTHALMIC
Qty: 3.5 G | Refills: 0 | Status: SHIPPED | OUTPATIENT
Start: 2020-03-14 | End: 2021-09-01 | Stop reason: ALTCHOICE

## 2020-03-14 NOTE — TELEPHONE ENCOUNTER
rec'd page with patient request as below    He is having rigth eye lid swelling and discomfort    No vision changes, eye redness, fever, cough or SOB    He declines to be seen at [facility] or walk-in care due to coronavirus transmission ongoing in community    He is using warm compresses and eyelid is doing a bit better today    Plan - erythromycin ointment sent to pharmacy          Healthcall team will confirm with patient to not wear contacts(if they use these) until they are better & c/w warm compresses

## 2020-03-14 NOTE — TELEPHONE ENCOUNTER
Regarding: Possible Stye  ----- Message from Aruna Valenzuela sent at 3/14/2020  9:22 AM EDT -----  "I have a bubble on the inside of my right eyelid and it hurts very bad "

## 2020-03-14 NOTE — TELEPHONE ENCOUNTER
Reason for Disposition   [1] Eye pain/discomfort AND [2] more than mild    Answer Assessment - Initial Assessment Questions  1  ONSET: "When did the pain start?" (e g , minutes, hours, days)      Yesterday     2  TIMING: "Does the pain come and go, or has it been constant since it started?" (e g , constant, intermittent, fleeting)      Constant    3  SEVERITY: "How bad is the pain?"   (Scale 1-10; mild, moderate or severe)    - MILD (1-3): doesn't interfere with normal activities     - MODERATE (4-7): interferes with normal activities or awakens from sleep     - SEVERE (8-10): excruciating pain and patient unable to do normal activities      4/10     4  LOCATION: "Where does it hurt?"  (e g , eyelid, eye, cheekbone)      Right upper inner eye lid    5  CAUSE: "What do you think is causing the pain?"      Possible stye     6  VISION: "Do you have blurred vision or changes in your vision?"       Vision not changed, states has a constant watery eye      7  EYE DISCHARGE: "Is there any discharge (pus) from the eye(s)?"  If yes, ask: "What color is it?"       Denies     8  FEVER: "Do you have a fever?" If so, ask: "What is it, how was it measured, and when did it start?"       Denies     9   OTHER SYMPTOMS: "Do you have any other symptoms?" (e g , headache, nasal discharge, facial rash)      Possible runny nose    Protocols used: EYE PAIN-ADULT-

## 2020-04-20 DIAGNOSIS — F41.9 ANXIETY: ICD-10-CM

## 2020-04-20 RX ORDER — ALPRAZOLAM 1 MG/1
TABLET ORAL
Qty: 15 TABLET | Refills: 0 | Status: SHIPPED | OUTPATIENT
Start: 2020-04-20 | End: 2021-01-26

## 2020-05-20 DIAGNOSIS — L70.9 ACNE, UNSPECIFIED ACNE TYPE: Primary | ICD-10-CM

## 2020-05-22 RX ORDER — ADAPALENE AND BENZOYL PEROXIDE 3; 25 MG/G; MG/G
1 GEL TOPICAL DAILY
Qty: 60 G | Refills: 1 | Status: SHIPPED | OUTPATIENT
Start: 2020-05-22 | End: 2022-05-20

## 2020-07-06 ENCOUNTER — TELEPHONE (OUTPATIENT)
Dept: UROLOGY | Facility: MEDICAL CENTER | Age: 65
End: 2020-07-06

## 2020-07-06 NOTE — TELEPHONE ENCOUNTER
FYI    Patient did not want to get triaged just wanted appointment with Zahra Mccormack  Found opening for 07/07/20  Patient scheduled  Patient is coming in for Left testicle pain for last 10 days upon self examination patient states he does see a purple vein sticking out

## 2020-07-07 ENCOUNTER — TELEPHONE (OUTPATIENT)
Dept: UROLOGY | Facility: MEDICAL CENTER | Age: 65
End: 2020-07-07

## 2020-07-07 ENCOUNTER — OFFICE VISIT (OUTPATIENT)
Dept: UROLOGY | Facility: MEDICAL CENTER | Age: 65
End: 2020-07-07
Payer: COMMERCIAL

## 2020-07-07 VITALS — TEMPERATURE: 98.4 F | WEIGHT: 232 LBS | HEIGHT: 73 IN | BODY MASS INDEX: 30.75 KG/M2

## 2020-07-07 DIAGNOSIS — N50.812 TESTICULAR PAIN, LEFT: ICD-10-CM

## 2020-07-07 DIAGNOSIS — N40.0 ENLARGED PROSTATE WITHOUT LOWER URINARY TRACT SYMPTOMS (LUTS): Primary | ICD-10-CM

## 2020-07-07 LAB
SL AMB  POCT GLUCOSE, UA: NORMAL
SL AMB LEUKOCYTE ESTERASE,UA: NORMAL
SL AMB POCT BILIRUBIN,UA: NORMAL
SL AMB POCT BLOOD,UA: NORMAL
SL AMB POCT CLARITY,UA: CLEAR
SL AMB POCT COLOR,UA: YELLOW
SL AMB POCT KETONES,UA: NORMAL
SL AMB POCT NITRITE,UA: NORMAL
SL AMB POCT PH,UA: 6
SL AMB POCT SPECIFIC GRAVITY,UA: 1.02
SL AMB POCT URINE PROTEIN: NORMAL
SL AMB POCT UROBILINOGEN: 0.2

## 2020-07-07 PROCEDURE — 3008F BODY MASS INDEX DOCD: CPT | Performed by: UROLOGY

## 2020-07-07 PROCEDURE — 1036F TOBACCO NON-USER: CPT | Performed by: UROLOGY

## 2020-07-07 PROCEDURE — 99214 OFFICE O/P EST MOD 30 MIN: CPT | Performed by: UROLOGY

## 2020-07-07 PROCEDURE — 81003 URINALYSIS AUTO W/O SCOPE: CPT | Performed by: UROLOGY

## 2020-07-07 NOTE — ASSESSMENT & PLAN NOTE
There does not appear to be any acute scrotal pathology  I recommended a trial of anti-inflammatory such as ibuprofen and heating pad  We will also obtain a scrotal ultrasound  He will call if symptoms worsen

## 2020-07-07 NOTE — TELEPHONE ENCOUNTER
Prior Authorization for Tadalafil (CIALIS) 5mg was requested and initiated via [employer] - Key: [id number] - [place] Case ID:  [id number]  Response questions answered and submitted for consideration  Final determination is expected within 24-72 hours

## 2020-07-07 NOTE — TELEPHONE ENCOUNTER
Prior Authorization for Tadalafil (CIALIS) 5mg was APPROVED and valid from 6/7/20 until 7/7/21 (90 tablets for 90 days)  Pharmacy notified of same

## 2020-07-07 NOTE — PATIENT INSTRUCTIONS
Scrotal Pain   WHAT YOU NEED TO KNOW:   Scrotal pain can happen at any age  The cause of scrotal pain can range from a minor injury to a serious medical condition  It is very important to seek immediate care if you have scrotal pain  The pain may be a warning sign of a serious condition that will need treatment  Without immediate care, you may be at increased risk for losing a testicle or being sterile (not having children)  DISCHARGE INSTRUCTIONS:   Return to the emergency department if:   · You have any warning signs of a serious problem  · You have pain or swelling that starts or gets worse quickly  · You have skin changes in your scrotum, such as a dark patch  · You have a fever  Contact your healthcare provider if:   · Your pain does not get better, even after you take pain medicine  · You have new or worsening pain  · You have questions or concerns about your condition or care  Medicines: You may need any of the following:  · Prescription pain medicine  may be given  Ask your healthcare provider how to take this medicine safely  Some prescription pain medicines contain acetaminophen  Do not take other medicines that contain acetaminophen without talking to your healthcare provider  Too much acetaminophen may cause liver damage  Prescription pain medicine may cause constipation  Ask your healthcare provider how to prevent or treat constipation  · NSAIDs , such as ibuprofen, help decrease swelling, pain, and fever  This medicine is available with or without a doctor's order  NSAIDs can cause stomach bleeding or kidney problems in certain people  If you take blood thinner medicine, always ask your healthcare provider if NSAIDs are safe for you  Always read the medicine label and follow directions  · Antibiotics  are used to treat a bacterial infection  · Take your medicine as directed    Contact your healthcare provider if you think your medicine is not helping or if you have side effects  Tell him or her if you are allergic to any medicine  Keep a list of the medicines, vitamins, and herbs you take  Include the amounts, and when and why you take them  Bring the list or the pill bottles to follow-up visits  Carry your medicine list with you in case of an emergency  Manage your symptoms:   · Wear a support device, if directed  A support device, such as a jock strap, can help keep your scrotum lifted and supported  This can help decrease pain  · Apply ice to your scrotum  Ice helps decrease pain and swelling  Use an ice pack, or put crushed ice in a plastic bag  Cover the pack or bag with a towel before you apply it to your scrotum  Apply ice for 15 to 20 minutes every hour, or as directed  Follow up with your healthcare provider as directed:  Write down your questions so you remember to ask them during your visits  © 2017 [employer] Information is for End User's use only and may not be sold, redistributed or otherwise used for commercial purposes  All illustrations and images included in CareNotes® are the copyrighted property of [employer]  or [employer]  The above information is an  only  It is not intended as medical advice for individual conditions or treatments  Talk to your doctor, nurse or pharmacist before following any medical regimen to see if it is safe and effective for you

## 2020-07-07 NOTE — PROGRESS NOTES
Assessment/Plan:    Testicular pain, left  There does not appear to be any acute scrotal pathology  I recommended a trial of anti-inflammatory such as ibuprofen and heating pad  We will also obtain a scrotal ultrasound  He will call if symptoms worsen  Enlarged prostate without lower urinary tract symptoms (luts)  He is satisfied his voiding pattern  Urinalysis is negative  PSA in December was normal at 1 3  We will continue to follow his voiding pattern watchful waiting  He will return in 1 year  We will plan to recheck a PSA and urinalysis prior to that visit  Diagnoses and all orders for this visit:    Enlarged prostate without lower urinary tract symptoms (luts)  -     PSA Total, Diagnostic; Future  -     Urinalysis with microscopic; Future  -     POCT urine dip auto non-scope    Testicular pain, left  -     US scrotum and testicles; Future          Subjective:      Patient ID: Tanika Henderson is a 62 y o  male  Chief complaint:  Testicular pain    HPI:  Patient has noted approximately 2 weeks of left testicular discomfort  The pain began slowly  It occurs with movement of his leg  The testicles also sensitive to touch  There is no fever or dysuria  He has no flank pain  There is no gross hematuria or symptoms of infection  He has not noted any testicular swelling  The symptoms described is annoying  Benign Prostatic Hypertrophy   This is a chronic problem  The current episode started more than 1 year ago  The problem is unchanged  Irritative symptoms do not include frequency, nocturia (NOcturia 0-1) or urgency  Obstructive symptoms include dribbling and a slower stream  Obstructive symptoms do not include incomplete emptying, an intermittent stream, straining or a weak stream  Pertinent negatives include no chills, dysuria, genital pain, hematuria, hesitancy, nausea or vomiting  His sexual activity is non-contributory to the current illness  Nothing aggravates the symptoms  The following portions of the patient's history were reviewed and updated as appropriate: allergies, current medications, past family history, past medical history, past social history, past surgical history and problem list     Review of Systems   Constitutional: Negative for chills, diaphoresis, fatigue and fever  HENT: Negative  Eyes: Negative  Respiratory: Negative  Cardiovascular: Negative  Gastrointestinal: Negative  Negative for nausea and vomiting  Endocrine: Negative  Genitourinary: Positive for testicular pain  Negative for dysuria, frequency, hematuria, hesitancy, incomplete emptying, nocturia (NOcturia 0-1) and urgency  See HPI   Musculoskeletal: Negative  Skin: Negative  Allergic/Immunologic: Negative  Neurological: Negative  Hematological: Negative  Psychiatric/Behavioral: Negative  Objective:      Ht 6' 1" (1 854 m)   Wt 105 kg (232 lb)   BMI 30 61 kg/m²          Physical Exam   Constitutional: He is oriented to person, place, and time  He appears well-developed and well-nourished  HENT:   Head: Normocephalic and atraumatic  Eyes: Conjunctivae are normal    Neck: Neck supple  Cardiovascular: Normal rate  Pulmonary/Chest: Effort normal    Abdominal: Soft  Bowel sounds are normal  He exhibits no distension and no mass  There is no tenderness  There is no rebound, no guarding and no CVA tenderness  No hernia  Genitourinary: Rectum normal, testes normal and penis normal  Right testis shows no mass  Left testis shows no mass  No phimosis or hypospadias  Genitourinary Comments: Fullness left epididymal head  No testicular mass  Prostate 1+ enlarged and palpably benign   Musculoskeletal: He exhibits no edema  Neurological: He is alert and oriented to person, place, and time  Skin: Skin is warm and dry  Psychiatric: He has a normal mood and affect   His behavior is normal  Judgment and thought content normal

## 2020-07-07 NOTE — ASSESSMENT & PLAN NOTE
He is satisfied his voiding pattern  Urinalysis is negative  PSA in December was normal at 1 3  We will continue to follow his voiding pattern watchful waiting  He will return in 1 year  We will plan to recheck a PSA and urinalysis prior to that visit

## 2020-07-15 ENCOUNTER — HOSPITAL ENCOUNTER (OUTPATIENT)
Dept: ULTRASOUND IMAGING | Facility: HOSPITAL | Age: 65
Discharge: HOME | End: 2020-07-15
Attending: UROLOGY
Payer: COMMERCIAL

## 2020-07-15 DIAGNOSIS — N50.812 TESTICULAR PAIN, LEFT: ICD-10-CM

## 2020-07-15 PROCEDURE — 76870 US EXAM SCROTUM: CPT

## 2020-09-09 ENCOUNTER — CLINICAL SUPPORT (OUTPATIENT)
Dept: INTERNAL MEDICINE CLINIC | Facility: CLINIC | Age: 65
End: 2020-09-09
Payer: COMMERCIAL

## 2020-09-09 DIAGNOSIS — Z23 NEED FOR INFLUENZA VACCINATION: Primary | ICD-10-CM

## 2020-09-09 PROCEDURE — 90471 IMMUNIZATION ADMIN: CPT

## 2020-09-09 PROCEDURE — 90682 RIV4 VACC RECOMBINANT DNA IM: CPT

## 2020-12-22 ENCOUNTER — OFFICE VISIT (OUTPATIENT)
Dept: URGENT CARE | Age: 65
End: 2020-12-22
Payer: COMMERCIAL

## 2020-12-22 VITALS — OXYGEN SATURATION: 99 % | RESPIRATION RATE: 16 BRPM | HEART RATE: 76 BPM | TEMPERATURE: 96.9 F

## 2020-12-22 DIAGNOSIS — J06.9 ACUTE UPPER RESPIRATORY INFECTION: Primary | ICD-10-CM

## 2020-12-22 DIAGNOSIS — Z20.822 CLOSE EXPOSURE TO COVID-19 VIRUS: ICD-10-CM

## 2020-12-22 PROCEDURE — 99213 OFFICE O/P EST LOW 20 MIN: CPT | Performed by: FAMILY MEDICINE

## 2020-12-22 PROCEDURE — U0003 INFECTIOUS AGENT DETECTION BY NUCLEIC ACID (DNA OR RNA); SEVERE ACUTE RESPIRATORY SYNDROME CORONAVIRUS 2 (SARS-COV-2) (CORONAVIRUS DISEASE [COVID-19]), AMPLIFIED PROBE TECHNIQUE, MAKING USE OF HIGH THROUGHPUT TECHNOLOGIES AS DESCRIBED BY CMS-2020-01-R: HCPCS | Performed by: FAMILY MEDICINE

## 2020-12-23 LAB — SARS-COV-2 RNA SPEC QL NAA+PROBE: DETECTED

## 2020-12-24 ENCOUNTER — TELEPHONE (OUTPATIENT)
Dept: URGENT CARE | Age: 65
End: 2020-12-24

## 2021-01-24 DIAGNOSIS — F41.9 ANXIETY: ICD-10-CM

## 2021-01-24 DIAGNOSIS — E03.9 HYPOTHYROIDISM, UNSPECIFIED TYPE: ICD-10-CM

## 2021-01-24 DIAGNOSIS — G47.00 INSOMNIA, UNSPECIFIED TYPE: ICD-10-CM

## 2021-01-26 RX ORDER — ALPRAZOLAM 1 MG/1
TABLET ORAL
Qty: 15 TABLET | Refills: 0 | Status: SHIPPED | OUTPATIENT
Start: 2021-01-26 | End: 2021-09-03 | Stop reason: SDUPTHER

## 2021-01-26 RX ORDER — ZOLPIDEM TARTRATE 10 MG/1
TABLET ORAL
Qty: 30 TABLET | Refills: 0 | Status: SHIPPED | OUTPATIENT
Start: 2021-01-26 | End: 2021-09-03 | Stop reason: SDUPTHER

## 2021-01-26 RX ORDER — LEVOTHYROXINE SODIUM 0.03 MG/1
TABLET ORAL
Qty: 90 TABLET | Refills: 3 | Status: SHIPPED | OUTPATIENT
Start: 2021-01-26 | End: 2022-01-12 | Stop reason: SDUPTHER

## 2021-01-28 DIAGNOSIS — N40.0 ENLARGED PROSTATE WITHOUT LOWER URINARY TRACT SYMPTOMS (LUTS): ICD-10-CM

## 2021-01-29 RX ORDER — TADALAFIL 5 MG/1
TABLET ORAL
Qty: 90 TABLET | Refills: 3 | Status: SHIPPED | OUTPATIENT
Start: 2021-01-29 | End: 2022-04-18

## 2021-03-30 DIAGNOSIS — Z23 ENCOUNTER FOR IMMUNIZATION: ICD-10-CM

## 2021-04-02 ENCOUNTER — IMMUNIZATIONS (OUTPATIENT)
Dept: FAMILY MEDICINE CLINIC | Facility: HOSPITAL | Age: 66
End: 2021-04-02

## 2021-04-02 DIAGNOSIS — Z23 ENCOUNTER FOR IMMUNIZATION: Primary | ICD-10-CM

## 2021-04-02 PROCEDURE — 91300 SARS-COV-2 / COVID-19 MRNA VACCINE (PFIZER-BIONTECH) 30 MCG: CPT

## 2021-04-02 PROCEDURE — 0001A SARS-COV-2 / COVID-19 MRNA VACCINE (PFIZER-BIONTECH) 30 MCG: CPT

## 2021-04-23 ENCOUNTER — IMMUNIZATIONS (OUTPATIENT)
Dept: FAMILY MEDICINE CLINIC | Facility: HOSPITAL | Age: 66
End: 2021-04-23

## 2021-04-23 DIAGNOSIS — Z23 ENCOUNTER FOR IMMUNIZATION: Primary | ICD-10-CM

## 2021-04-23 PROCEDURE — 91300 SARS-COV-2 / COVID-19 MRNA VACCINE (PFIZER-BIONTECH) 30 MCG: CPT

## 2021-04-23 PROCEDURE — 0002A SARS-COV-2 / COVID-19 MRNA VACCINE (PFIZER-BIONTECH) 30 MCG: CPT

## 2021-05-27 ENCOUNTER — TELEPHONE (OUTPATIENT)
Dept: UROLOGY | Facility: MEDICAL CENTER | Age: 66
End: 2021-05-27

## 2021-05-27 ENCOUNTER — TELEPHONE (OUTPATIENT)
Dept: UROLOGY | Facility: AMBULATORY SURGERY CENTER | Age: 66
End: 2021-05-27

## 2021-05-27 NOTE — TELEPHONE ENCOUNTER
Previous patient of Dr Richy Novak, and since Dr Richy Novak isn't in the office as much his pateints are seeing the PA  Patient does not want to see a PA  Patient requests to see Dr Tiffany Gonzalez to establish care  Patient isn't currently experiencing any symptoms  Dr Lissa Angeles schedule looks like it is on hold  Patient would like a call back regarding this  Patient can be reached at [phone]

## 2021-05-27 NOTE — TELEPHONE ENCOUNTER
Patient managed by Sarah Beth Wright is calling to cancel his appointment on 7/21/21 with PA  He stated he does not want to see a female PA  I offered to schedule with Manish Mariano and he declined  He stated he does not want to see someone new in the practice  He wants to find another Urology group   monse

## 2021-05-28 NOTE — TELEPHONE ENCOUNTER
I called pt and started to leave a message in regard to our new practice model and scheduling fu apts with AP  The machine cut me off  If pt calls back, please reiterate the practice model and reschedule pt with an AP at the location of his choice

## 2021-07-08 ENCOUNTER — TELEPHONE (OUTPATIENT)
Dept: INTERNAL MEDICINE CLINIC | Facility: CLINIC | Age: 66
End: 2021-07-08

## 2021-07-08 NOTE — TELEPHONE ENCOUNTER
Spoke to patient  He is requesting new orders for the PSA, TSH, CMP, CBC and Vit D  Orders in chart  Patient will  today

## 2021-07-08 NOTE — TELEPHONE ENCOUNTER
Attn: MA or Nurse    Patient called in would like to get his labs completed but they have  would one of our MA's be able to update this? Patient did not need a call he just requested that we mail to his home address  Confirmed that it is a current address

## 2021-07-12 LAB
25(OH)D3 SERPL-MCNC: 30 NG/ML (ref 30–100)
ALBUMIN SERPL-MCNC: 4.3 G/DL (ref 3.6–5.1)
ALBUMIN/GLOB SERPL: 2 (CALC) (ref 1–2.5)
ALP SERPL-CCNC: 65 U/L (ref 35–144)
ALT SERPL-CCNC: 16 U/L (ref 9–46)
AST SERPL-CCNC: 20 U/L (ref 10–35)
BILIRUB SERPL-MCNC: 0.7 MG/DL (ref 0.2–1.2)
BUN SERPL-MCNC: 17 MG/DL (ref 7–25)
BUN/CREAT SERPL: NORMAL (CALC) (ref 6–22)
CALCIUM SERPL-MCNC: 9.3 MG/DL (ref 8.6–10.3)
CHLORIDE SERPL-SCNC: 105 MMOL/L (ref 98–110)
CHOLEST SERPL-MCNC: 206 MG/DL
CHOLEST/HDLC SERPL: 4.6 (CALC)
CO2 SERPL-SCNC: 30 MMOL/L (ref 20–32)
CREAT SERPL-MCNC: 1.05 MG/DL (ref 0.7–1.25)
GLOBULIN SER CALC-MCNC: 2.1 G/DL (CALC) (ref 1.9–3.7)
GLUCOSE SERPL-MCNC: 89 MG/DL (ref 65–99)
HDLC SERPL-MCNC: 45 MG/DL
LDLC SERPL CALC-MCNC: 130 MG/DL (CALC)
NONHDLC SERPL-MCNC: 161 MG/DL (CALC)
POTASSIUM SERPL-SCNC: 4.3 MMOL/L (ref 3.5–5.3)
PROT SERPL-MCNC: 6.4 G/DL (ref 6.1–8.1)
PSA SERPL-MCNC: 1.8 NG/ML
SL AMB EGFR AFRICAN AMERICAN: 87 ML/MIN/1.73M2
SL AMB EGFR NON AFRICAN AMERICAN: 75 ML/MIN/1.73M2
SODIUM SERPL-SCNC: 140 MMOL/L (ref 135–146)
TRIGL SERPL-MCNC: 168 MG/DL
TSH SERPL-ACNC: 3.37 MIU/L (ref 0.4–4.5)

## 2021-09-03 ENCOUNTER — OFFICE VISIT (OUTPATIENT)
Dept: INTERNAL MEDICINE CLINIC | Facility: CLINIC | Age: 66
End: 2021-09-03
Payer: MEDICARE

## 2021-09-03 VITALS
OXYGEN SATURATION: 98 % | DIASTOLIC BLOOD PRESSURE: 68 MMHG | HEIGHT: 73 IN | SYSTOLIC BLOOD PRESSURE: 104 MMHG | HEART RATE: 88 BPM | BODY MASS INDEX: 31.12 KG/M2 | WEIGHT: 234.8 LBS

## 2021-09-03 DIAGNOSIS — Z13.6 SCREENING FOR CARDIOVASCULAR CONDITION: ICD-10-CM

## 2021-09-03 DIAGNOSIS — G47.33 OBSTRUCTIVE SLEEP APNEA: ICD-10-CM

## 2021-09-03 DIAGNOSIS — E03.9 HYPOTHYROIDISM, UNSPECIFIED TYPE: ICD-10-CM

## 2021-09-03 DIAGNOSIS — F41.9 ANXIETY: ICD-10-CM

## 2021-09-03 DIAGNOSIS — E78.2 MIXED HYPERLIPIDEMIA: ICD-10-CM

## 2021-09-03 DIAGNOSIS — Z11.4 SCREENING FOR HIV (HUMAN IMMUNODEFICIENCY VIRUS): ICD-10-CM

## 2021-09-03 DIAGNOSIS — Z23 ENCOUNTER FOR IMMUNIZATION: ICD-10-CM

## 2021-09-03 DIAGNOSIS — Z12.5 SCREENING PSA (PROSTATE SPECIFIC ANTIGEN): ICD-10-CM

## 2021-09-03 DIAGNOSIS — Z13.6 ENCOUNTER FOR ABDOMINAL AORTIC ANEURYSM (AAA) SCREENING: ICD-10-CM

## 2021-09-03 DIAGNOSIS — L71.9 ROSACEA: ICD-10-CM

## 2021-09-03 DIAGNOSIS — G47.00 INSOMNIA, UNSPECIFIED TYPE: ICD-10-CM

## 2021-09-03 DIAGNOSIS — Z00.00 MEDICARE ANNUAL WELLNESS VISIT, SUBSEQUENT: Primary | ICD-10-CM

## 2021-09-03 DIAGNOSIS — R05.3 CHRONIC COUGH: ICD-10-CM

## 2021-09-03 PROBLEM — N50.812 TESTICULAR PAIN, LEFT: Status: RESOLVED | Noted: 2020-07-07 | Resolved: 2021-09-03

## 2021-09-03 PROCEDURE — G0402 INITIAL PREVENTIVE EXAM: HCPCS | Performed by: INTERNAL MEDICINE

## 2021-09-03 PROCEDURE — 1123F ACP DISCUSS/DSCN MKR DOCD: CPT | Performed by: INTERNAL MEDICINE

## 2021-09-03 PROCEDURE — 90670 PCV13 VACCINE IM: CPT

## 2021-09-03 PROCEDURE — G0403 EKG FOR INITIAL PREVENT EXAM: HCPCS | Performed by: INTERNAL MEDICINE

## 2021-09-03 PROCEDURE — G0009 ADMIN PNEUMOCOCCAL VACCINE: HCPCS

## 2021-09-03 RX ORDER — FLUTICASONE PROPIONATE 50 MCG
1 SPRAY, SUSPENSION (ML) NASAL DAILY
Qty: 16 G | Refills: 2 | Status: SHIPPED | OUTPATIENT
Start: 2021-09-03 | End: 2021-10-15 | Stop reason: SDUPTHER

## 2021-09-03 RX ORDER — ALPRAZOLAM 1 MG/1
1 TABLET ORAL DAILY PRN
Qty: 15 TABLET | Refills: 0 | Status: SHIPPED | OUTPATIENT
Start: 2021-09-03 | End: 2022-01-11 | Stop reason: SDUPTHER

## 2021-09-03 RX ORDER — ZOLPIDEM TARTRATE 10 MG/1
10 TABLET ORAL
Qty: 30 TABLET | Refills: 0 | Status: SHIPPED | OUTPATIENT
Start: 2021-09-03 | End: 2022-01-11 | Stop reason: SDUPTHER

## 2021-09-03 NOTE — PROGRESS NOTES
Assessment and Plan:     Problem List Items Addressed This Visit        Endocrine    Hypothyroidism    Relevant Orders    CBC and differential    Comprehensive metabolic panel    TSH, 3rd generation with Free T4 reflex       Respiratory    Obstructive sleep apnea    Relevant Orders    Ambulatory Referral to Otolaryngology       Musculoskeletal and Integument    Rosacea    Relevant Orders    Ambulatory referral to Dermatology       Other    Hyperlipidemia    Relevant Orders    CT coronary calcium score    Comprehensive metabolic panel    Lipid Panel with Direct LDL reflex    Anxiety    Relevant Medications    ALPRAZolam (XANAX) 1 mg tablet    Insomnia    Relevant Medications    zolpidem (AMBIEN) 10 mg tablet      Other Visit Diagnoses     Medicare annual wellness visit, subsequent    -  Primary    Screening for HIV (human immunodeficiency virus)        Relevant Orders    HIV 1/2 Antigen/Antibody (4th Generation) w Reflex [facility]    Encounter for immunization        Relevant Orders    PNEUMOCOCCAL CONJUGATE VACCINE 13-VALENT GREATER THAN 6 MONTHS (Completed)    Screening for cardiovascular condition        Relevant Orders    POCT ECG (Completed)    Screening PSA (prostate specific antigen)        Relevant Orders    PSA, Total Screen    Encounter for abdominal aortic aneurysm (AAA) screening        Relevant Orders    US abdominal aorta screening aaa    Chronic cough        Suspect this is related to post nasla drip   Try Fluticasone    Relevant Medications    fluticasone (FLONASE) 50 mcg/act nasal spray        BMI Counseling: Body mass index is 30 98 kg/m²  The BMI is above normal  Nutrition recommendations include moderation in carbohydrate intake  Exercise recommendations include exercising 3-5 times per week  Preventive health issues were discussed with patient, and age appropriate screening tests were ordered as noted in patient's After Visit Summary    Personalized health advice and appropriate referrals for health education or preventive services given if needed, as noted in patient's After Visit Summary       History of Present Illness:     Patient presents for Medicare Annual Wellness visit    Patient Care Team:  Denny Stein MD as PCP - General  Euel Mighty, MD Hunter Portal, MD Layvonne Hammans, DO (Pain Medicine)  Rosie Deal PA-C     Problem List:     Patient Active Problem List   Diagnosis    Anxiety    Chronic left-sided low back pain    Spondylosis of cervical region without myelopathy or radiculopathy    Weakness of left leg    Radiculopathy, lumbar region    Hyperlipidemia    Enlarged prostate without lower urinary tract symptoms (luts)    Cervical radiculitis    Obstructive sleep apnea    Insomnia    Hypothyroidism    Rosacea      Past Medical and Surgical History:     Past Medical History:   Diagnosis Date    Anxiety     BPH with obstruction/lower urinary tract symptoms     BPH with obstruction/lower urinary tract symptoms     Hypogonadism in male     Lightheadedness     last assessed-2015    Nocturia     Nocturia     Partial small bowel obstruction ([address] )     last assessed-2016    Shoulder injury, left, initial encounter     resolved:2017    Sixth cranial nerve palsy     last assessed-2015    Testicular pain, left 2020     Past Surgical History:   Procedure Laterality Date    NASAL SINUS SURGERY Bilateral     TONSILLECTOMY        Family History:     Family History   Problem Relation Age of Onset    Cancer Other     Diabetes Family     Other Other         back disorder    Heart disease Brother       Social History:     Social History     Socioeconomic History    Marital status: /Civil Union     Spouse name: None    Number of children: None    Years of education: None    Highest education level: None   Occupational History    None   Tobacco Use    Smoking status: Former Smoker     Quit date: 1988     Years since quittin 2    Smokeless tobacco: Former User   Vaping Use    Vaping Use: Never used   Substance and Sexual Activity    Alcohol use: Yes     Alcohol/week: 1 0 standard drinks     Types: 1 Cans of beer per week     Comment: social    Drug use: No    Sexual activity: Yes     Partners: Female     Comment: no comment   Other Topics Concern    None   Social History Narrative    Daily caffeine consumption     Social Determinants of Health     Financial Resource Strain:     Difficulty of Paying Living Expenses:    Food Insecurity:     Worried About Running Out of Food in the Last Year:     Ran Out of Food in the Last Year:    Transportation Needs:     Lack of Transportation (Medical):      Lack of Transportation (Non-Medical):    Physical Activity:     Days of Exercise per Week:     Minutes of Exercise per Session:    Stress:     Feeling of Stress :    Social Connections:     Frequency of Communication with Friends and Family:     Frequency of Social Gatherings with Friends and Family:     Attends Scientologist Services:     Active Member of Clubs or Organizations:     Attends Club or Organization Meetings:     Marital Status:    Intimate Partner Violence:     Fear of Current or Ex-Partner:     Emotionally Abused:     Physically Abused:     Sexually Abused:       Medications and Allergies:     Current Outpatient Medications   Medication Sig Dispense Refill    Adapalene-Benzoyl Peroxide (Epiduo Forte) 0 3-2 5 % GEL Apply 1 application topically daily 60 g 1    ALPRAZolam (XANAX) 1 mg tablet Take 1 tablet (1 mg total) by mouth daily as needed for anxiety 15 tablet 0    levothyroxine 25 mcg tablet TAKE 1 TABLET BY MOUTH EVERY DAY 90 tablet 3    tadalafil (CIALIS) 5 MG tablet TAKE 1 TABLET BY MOUTH DAILY FOR SYMPTOMS OF BPH 90 tablet 3    zolpidem (AMBIEN) 10 mg tablet Take 1 tablet (10 mg total) by mouth daily at bedtime as needed for sleep 30 tablet 0    fluticasone (FLONASE) 50 mcg/act nasal spray 1 spray into each nostril daily 16 g 2    metroNIDAZOLE (METROGEL) 1 % gel Apply topically daily (Patient not taking: Reported on 9/3/2021) 60 g 0     No current facility-administered medications for this visit  Allergies   Allergen Reactions    Codeine GI Intolerance    Pine Allergic Rhinitis    Pollen Extract Allergic Rhinitis      Immunizations:     Immunization History   Administered Date(s) Administered    Influenza Quadrivalent Preservative Free 3 years and older IM 12/17/2015, 10/20/2016, 10/19/2017    Influenza, recombinant, quadrivalent,injectable, preservative free 10/18/2018, 09/05/2019, 09/09/2020    Pneumococcal Conjugate 13-Valent 09/03/2021    SARS-CoV-2 / COVID-19 mRNA IM ([employer] [employer]) 04/02/2021, 04/23/2021    Tdap 09/01/2014    Zoster 09/01/2014    Zoster Vaccine Recombinant 02/01/2019, 04/17/2019      Health Maintenance:         Topic Date Due    HIV Screening  Never done    Colorectal Cancer Screening  06/05/2024    Hepatitis C Screening  Completed         Topic Date Due    Influenza Vaccine (1) 09/01/2021      Medicare Health Risk Assessment:     /68   Pulse 88   Ht 6' 1" (1 854 m)   Wt 107 kg (234 lb 12 8 oz)   SpO2 98%   BMI 30 98 kg/m²      Poncho Dawn is here for his Welcome to Medicare visit  Health Risk Assessment:   Patient rates overall health as very good  Patient feels that their physical health rating is same  Patient is satisfied with their life  Eyesight was rated as same  Hearing was rated as slightly worse  Patient feels that their emotional and mental health rating is same  Patients states they are sometimes angry  Patient states they are often unusually tired/fatigued  Pain experienced in the last 7 days has been none  Patient states that he has experienced no weight loss or gain in last 6 months  Depression Screening:   PHQ-2 Score: 0      Fall Risk Screening:    In the past year, patient has experienced: no history of falling in past year      Home Safety:  Patient does not have trouble with stairs inside or outside of their home  Patient has working smoke alarms and has working carbon monoxide detector  Home safety hazards include: loose rugs on the floor and not having non-slip bath and/or shower mats  Nutrition:   Current diet is Regular  Medications:   Patient is not currently taking any over-the-counter supplements  Patient is able to manage medications  Activities of Daily Living (ADLs)/Instrumental Activities of Daily Living (IADLs):   Walk and transfer into and out of bed and chair?: Yes  Dress and groom yourself?: Yes    Bathe or shower yourself?: Yes    Feed yourself?  Yes  Do your laundry/housekeeping?: Yes  Manage your money, pay your bills and track your expenses?: Yes  Make your own meals?: Yes    Do your own shopping?: Yes    Durable Medical Equipment Suppliers  n/a    Previous Hospitalizations:   Any hospitalizations or ED visits within the last 12 months?: No      Advance Care Planning:   Living will: No    Durable POA for healthcare: No    Advanced directive: No    Five wishes given: Yes      Cognitive Screening:   Provider or family/friend/caregiver concerned regarding cognition?: No    PREVENTIVE SCREENINGS      Cardiovascular Screening:    General: Screening Not Indicated and History Lipid Disorder      Diabetes Screening:     General: Screening Current      Colorectal Cancer Screening:     General: Screening Current      Prostate Cancer Screening:    General: Screening Current      Osteoporosis Screening:    General: Screening Not Indicated      Abdominal Aortic Aneurysm (AAA) Screening:    Risk factors include: age between 73-69 yo and tobacco use        General: Risks and Benefits Discussed    Due for: Screening AAA Ultrasound      Lung Cancer Screening:     General: Screening Not Indicated      Hepatitis C Screening:    General: Screening Current    Screening, Brief Intervention, and Referral to Treatment (SBIRT)    Screening  Typical number of drinks in a day: 0  Typical number of drinks in a week: 3  Interpretation: Low risk drinking behavior  Single Item Drug Screening:  How often have you used an illegal drug (including marijuana) or a prescription medication for non-medical reasons in the past year? never    Single Item Drug Screen Score: 0  Interpretation: Negative screen for possible drug use disorder    Other Counseling Topics:   Calcium and vitamin D intake  Review of Systems   Constitutional: Positive for fatigue  Negative for chills, fever and unexpected weight change  HENT: Negative for postnasal drip  Respiratory: Positive for cough (moist; coughs to expectorate phlegm; unclear when it started; he did have COVID in January)  Negative for shortness of breath and wheezing  Cardiovascular: Negative for chest pain and palpitations  Gastrointestinal: Negative for abdominal pain, constipation, diarrhea, nausea and vomiting  Genitourinary: Negative for difficulty urinating  Musculoskeletal: Positive for neck pain (chronic)  Negative for arthralgias and myalgias  Skin:        Rosacea of the nose and would like to see derm   Neurological: Negative for dizziness and headaches  Physical Exam  Vitals and nursing note reviewed  Constitutional:       General: He is not in acute distress  Appearance: He is well-developed  He is not ill-appearing, toxic-appearing or diaphoretic  HENT:      Head: Normocephalic and atraumatic  Right Ear: External ear normal  There is no impacted cerumen  Left Ear: External ear normal  There is no impacted cerumen  Eyes:      Conjunctiva/sclera: Conjunctivae normal    Cardiovascular:      Rate and Rhythm: Normal rate and regular rhythm  Heart sounds: No murmur heard  Pulmonary:      Effort: Pulmonary effort is normal  No respiratory distress  Breath sounds: Normal breath sounds  Abdominal:      Palpations: Abdomen is soft  Tenderness: There is no abdominal tenderness  Musculoskeletal:      Cervical back: Neck supple  Right lower leg: No edema  Left lower leg: No edema  Skin:     General: Skin is warm and dry  Neurological:      Mental Status: He is alert and oriented to person, place, and time  Psychiatric:         Mood and Affect: Mood normal          Behavior: Behavior normal          Thought Content:  Thought content normal          Judgment: Judgment normal            Coco Velasco MD

## 2021-09-03 NOTE — PATIENT INSTRUCTIONS

## 2021-10-10 ENCOUNTER — HOSPITAL ENCOUNTER (OUTPATIENT)
Dept: CT IMAGING | Facility: HOSPITAL | Age: 66
Discharge: HOME | End: 2021-10-10
Payer: MEDICARE

## 2021-10-10 ENCOUNTER — HOSPITAL ENCOUNTER (OUTPATIENT)
Dept: ULTRASOUND IMAGING | Facility: HOSPITAL | Age: 66
Discharge: HOME | End: 2021-10-10
Payer: MEDICARE

## 2021-10-10 DIAGNOSIS — E78.2 MIXED HYPERLIPIDEMIA: ICD-10-CM

## 2021-10-10 DIAGNOSIS — Z13.6 ENCOUNTER FOR ABDOMINAL AORTIC ANEURYSM (AAA) SCREENING: ICD-10-CM

## 2021-10-10 PROCEDURE — 76706 US ABDL AORTA SCREEN AAA: CPT

## 2021-10-10 PROCEDURE — G1004 CDSM NDSC: HCPCS

## 2021-10-10 PROCEDURE — 75571 CT HRT W/O DYE W/CA TEST: CPT

## 2021-10-15 DIAGNOSIS — R05.3 CHRONIC COUGH: ICD-10-CM

## 2021-10-15 RX ORDER — FLUTICASONE PROPIONATE 50 MCG
1 SPRAY, SUSPENSION (ML) NASAL 2 TIMES DAILY
Qty: 16 G | Refills: 2 | Status: SHIPPED | OUTPATIENT
Start: 2021-10-15 | End: 2022-03-04

## 2021-11-02 ENCOUNTER — CLINICAL SUPPORT (OUTPATIENT)
Dept: INTERNAL MEDICINE CLINIC | Facility: CLINIC | Age: 66
End: 2021-11-02
Payer: MEDICARE

## 2021-11-02 DIAGNOSIS — Z23 NEED FOR VACCINATION: Primary | ICD-10-CM

## 2021-11-02 PROCEDURE — G0008 ADMIN INFLUENZA VIRUS VAC: HCPCS

## 2021-11-02 PROCEDURE — 90662 IIV NO PRSV INCREASED AG IM: CPT

## 2021-11-18 ENCOUNTER — OFFICE VISIT (OUTPATIENT)
Dept: FAMILY MEDICINE CLINIC | Facility: CLINIC | Age: 66
End: 2021-11-18
Payer: MEDICARE

## 2021-11-18 VITALS
DIASTOLIC BLOOD PRESSURE: 70 MMHG | HEART RATE: 86 BPM | WEIGHT: 228.8 LBS | BODY MASS INDEX: 30.32 KG/M2 | SYSTOLIC BLOOD PRESSURE: 110 MMHG | RESPIRATION RATE: 16 BRPM | HEIGHT: 73 IN | OXYGEN SATURATION: 98 % | TEMPERATURE: 97.3 F

## 2021-11-18 DIAGNOSIS — R10.30 LOWER ABDOMINAL PAIN: Primary | ICD-10-CM

## 2021-11-18 DIAGNOSIS — R14.3 EXCESSIVE FLATUS: ICD-10-CM

## 2021-11-18 DIAGNOSIS — K59.09 OTHER CONSTIPATION: ICD-10-CM

## 2021-11-18 PROCEDURE — 99214 OFFICE O/P EST MOD 30 MIN: CPT | Performed by: INTERNAL MEDICINE

## 2021-11-18 RX ORDER — AMOXICILLIN 500 MG/1
CAPSULE ORAL
COMMUNITY
Start: 2021-11-15 | End: 2022-04-18

## 2021-11-18 RX ORDER — OMEPRAZOLE 20 MG/1
20 CAPSULE, DELAYED RELEASE ORAL
Qty: 30 CAPSULE | Refills: 0 | Status: SHIPPED | OUTPATIENT
Start: 2021-11-18 | End: 2022-05-20

## 2021-11-19 ENCOUNTER — APPOINTMENT (OUTPATIENT)
Dept: LAB | Facility: CLINIC | Age: 66
End: 2021-11-19
Payer: MEDICARE

## 2021-11-19 ENCOUNTER — HOSPITAL ENCOUNTER (OUTPATIENT)
Dept: RADIOLOGY | Facility: HOSPITAL | Age: 66
Discharge: HOME | End: 2021-11-19
Attending: INTERNAL MEDICINE
Payer: MEDICARE

## 2021-11-19 ENCOUNTER — TELEPHONE (OUTPATIENT)
Dept: FAMILY MEDICINE CLINIC | Facility: CLINIC | Age: 66
End: 2021-11-19

## 2021-11-19 DIAGNOSIS — K59.09 OTHER CONSTIPATION: ICD-10-CM

## 2021-11-19 DIAGNOSIS — R10.30 LOWER ABDOMINAL PAIN: ICD-10-CM

## 2021-11-19 DIAGNOSIS — R14.3 EXCESSIVE FLATUS: ICD-10-CM

## 2021-11-19 LAB
ALBUMIN SERPL BCP-MCNC: 3.8 G/DL (ref 3.5–5)
ALP SERPL-CCNC: 71 U/L (ref 46–116)
ALT SERPL W P-5'-P-CCNC: 22 U/L (ref 12–78)
ANION GAP SERPL CALCULATED.3IONS-SCNC: 7 MMOL/L (ref 4–13)
AST SERPL W P-5'-P-CCNC: 25 U/L (ref 5–45)
BASOPHILS # BLD AUTO: 0.03 THOUSANDS/ΜL (ref 0–0.1)
BASOPHILS NFR BLD AUTO: 1 % (ref 0–1)
BILIRUB SERPL-MCNC: 0.5 MG/DL (ref 0.2–1)
BUN SERPL-MCNC: 15 MG/DL (ref 5–25)
CALCIUM SERPL-MCNC: 9.1 MG/DL (ref 8.3–10.1)
CHLORIDE SERPL-SCNC: 106 MMOL/L (ref 100–108)
CO2 SERPL-SCNC: 28 MMOL/L (ref 21–32)
CREAT SERPL-MCNC: 1.39 MG/DL (ref 0.6–1.3)
EOSINOPHIL # BLD AUTO: 0.06 THOUSAND/ΜL (ref 0–0.61)
EOSINOPHIL NFR BLD AUTO: 1 % (ref 0–6)
ERYTHROCYTE [DISTWIDTH] IN BLOOD BY AUTOMATED COUNT: 12.6 % (ref 11.6–15.1)
GFR SERPL CREATININE-BSD FRML MDRD: 53 ML/MIN/1.73SQ M
GLUCOSE SERPL-MCNC: 98 MG/DL (ref 65–140)
HCT VFR BLD AUTO: 43.6 % (ref 36.5–49.3)
HGB BLD-MCNC: 14.7 G/DL (ref 12–17)
IMM GRANULOCYTES # BLD AUTO: 0.02 THOUSAND/UL (ref 0–0.2)
IMM GRANULOCYTES NFR BLD AUTO: 0 % (ref 0–2)
LYMPHOCYTES # BLD AUTO: 2.24 THOUSANDS/ΜL (ref 0.6–4.47)
LYMPHOCYTES NFR BLD AUTO: 40 % (ref 14–44)
MCH RBC QN AUTO: 29.7 PG (ref 26.8–34.3)
MCHC RBC AUTO-ENTMCNC: 33.7 G/DL (ref 31.4–37.4)
MCV RBC AUTO: 88 FL (ref 82–98)
MONOCYTES # BLD AUTO: 0.7 THOUSAND/ΜL (ref 0.17–1.22)
MONOCYTES NFR BLD AUTO: 12 % (ref 4–12)
NEUTROPHILS # BLD AUTO: 2.58 THOUSANDS/ΜL (ref 1.85–7.62)
NEUTS SEG NFR BLD AUTO: 46 % (ref 43–75)
NRBC BLD AUTO-RTO: 0 /100 WBCS
PLATELET # BLD AUTO: 260 THOUSANDS/UL (ref 149–390)
PMV BLD AUTO: 9.1 FL (ref 8.9–12.7)
POTASSIUM SERPL-SCNC: 4.2 MMOL/L (ref 3.5–5.3)
PROT SERPL-MCNC: 7 G/DL (ref 6.4–8.2)
RBC # BLD AUTO: 4.95 MILLION/UL (ref 3.88–5.62)
SODIUM SERPL-SCNC: 141 MMOL/L (ref 136–145)
TSH SERPL DL<=0.05 MIU/L-ACNC: 1.79 UIU/ML (ref 0.36–3.74)
WBC # BLD AUTO: 5.63 THOUSAND/UL (ref 4.31–10.16)

## 2021-11-19 PROCEDURE — 80053 COMPREHEN METABOLIC PANEL: CPT

## 2021-11-19 PROCEDURE — 85025 COMPLETE CBC W/AUTO DIFF WBC: CPT

## 2021-11-19 PROCEDURE — 36415 COLL VENOUS BLD VENIPUNCTURE: CPT

## 2021-11-19 PROCEDURE — 84443 ASSAY THYROID STIM HORMONE: CPT

## 2021-11-19 PROCEDURE — 74022 RADEX COMPL AQT ABD SERIES: CPT

## 2021-11-22 ENCOUNTER — TELEPHONE (OUTPATIENT)
Dept: FAMILY MEDICINE CLINIC | Facility: CLINIC | Age: 66
End: 2021-11-22

## 2021-11-22 ENCOUNTER — APPOINTMENT (OUTPATIENT)
Dept: LAB | Facility: CLINIC | Age: 66
End: 2021-11-22
Payer: MEDICARE

## 2021-11-22 DIAGNOSIS — R10.30 LOWER ABDOMINAL PAIN: ICD-10-CM

## 2021-11-22 DIAGNOSIS — R14.3 EXCESSIVE FLATUS: ICD-10-CM

## 2021-11-22 PROCEDURE — 87505 NFCT AGENT DETECTION GI: CPT

## 2021-11-22 PROCEDURE — 87338 HPYLORI STOOL AG IA: CPT

## 2021-11-23 LAB
CAMPYLOBACTER DNA SPEC NAA+PROBE: NORMAL
G LAMBLIA AG STL QL IA: NEGATIVE
H PYLORI AG STL QL IA: NEGATIVE
SALMONELLA DNA SPEC QL NAA+PROBE: NORMAL
SHIGA TOXIN STX GENE SPEC NAA+PROBE: NORMAL
SHIGELLA DNA SPEC QL NAA+PROBE: NORMAL

## 2021-12-02 ENCOUNTER — OFFICE VISIT (OUTPATIENT)
Dept: FAMILY MEDICINE CLINIC | Facility: CLINIC | Age: 66
End: 2021-12-02
Payer: MEDICARE

## 2021-12-02 VITALS
HEART RATE: 97 BPM | HEIGHT: 73 IN | TEMPERATURE: 98.2 F | WEIGHT: 231.4 LBS | SYSTOLIC BLOOD PRESSURE: 130 MMHG | OXYGEN SATURATION: 98 % | BODY MASS INDEX: 30.67 KG/M2 | DIASTOLIC BLOOD PRESSURE: 70 MMHG | RESPIRATION RATE: 16 BRPM

## 2021-12-02 DIAGNOSIS — R14.3 EXCESSIVE FLATUS: Primary | ICD-10-CM

## 2021-12-02 DIAGNOSIS — N18.31 STAGE 3A CHRONIC KIDNEY DISEASE (HCC): ICD-10-CM

## 2021-12-02 DIAGNOSIS — N17.9 ACUTE RENAL FAILURE, UNSPECIFIED ACUTE RENAL FAILURE TYPE (HCC): ICD-10-CM

## 2021-12-02 DIAGNOSIS — R10.30 LOWER ABDOMINAL PAIN: ICD-10-CM

## 2021-12-02 PROCEDURE — 99213 OFFICE O/P EST LOW 20 MIN: CPT | Performed by: INTERNAL MEDICINE

## 2021-12-30 ENCOUNTER — APPOINTMENT (OUTPATIENT)
Dept: LAB | Facility: CLINIC | Age: 66
End: 2021-12-30
Payer: MEDICARE

## 2021-12-30 DIAGNOSIS — N17.9 ACUTE RENAL FAILURE, UNSPECIFIED ACUTE RENAL FAILURE TYPE (HCC): ICD-10-CM

## 2021-12-30 LAB
ANION GAP SERPL CALCULATED.3IONS-SCNC: 8 MMOL/L (ref 4–13)
BUN SERPL-MCNC: 14 MG/DL (ref 5–25)
CALCIUM SERPL-MCNC: 8.8 MG/DL (ref 8.3–10.1)
CHLORIDE SERPL-SCNC: 102 MMOL/L (ref 100–108)
CO2 SERPL-SCNC: 29 MMOL/L (ref 21–32)
CREAT SERPL-MCNC: 1.25 MG/DL (ref 0.6–1.3)
GFR SERPL CREATININE-BSD FRML MDRD: 60 ML/MIN/1.73SQ M
GLUCOSE P FAST SERPL-MCNC: 92 MG/DL (ref 65–99)
POTASSIUM SERPL-SCNC: 4.1 MMOL/L (ref 3.5–5.3)
SODIUM SERPL-SCNC: 139 MMOL/L (ref 136–145)

## 2021-12-30 PROCEDURE — 80048 BASIC METABOLIC PNL TOTAL CA: CPT

## 2021-12-30 PROCEDURE — 36415 COLL VENOUS BLD VENIPUNCTURE: CPT

## 2022-01-06 ENCOUNTER — OFFICE VISIT (OUTPATIENT)
Dept: FAMILY MEDICINE CLINIC | Facility: CLINIC | Age: 67
End: 2022-01-06
Payer: MEDICARE

## 2022-01-06 VITALS
TEMPERATURE: 97.6 F | SYSTOLIC BLOOD PRESSURE: 130 MMHG | DIASTOLIC BLOOD PRESSURE: 90 MMHG | HEART RATE: 80 BPM | HEIGHT: 73 IN | WEIGHT: 231.6 LBS | RESPIRATION RATE: 16 BRPM | BODY MASS INDEX: 30.69 KG/M2 | OXYGEN SATURATION: 97 %

## 2022-01-06 DIAGNOSIS — R14.3 EXCESSIVE FLATUS: ICD-10-CM

## 2022-01-06 DIAGNOSIS — K59.09 OTHER CONSTIPATION: ICD-10-CM

## 2022-01-06 DIAGNOSIS — N17.9 ACUTE RENAL FAILURE, UNSPECIFIED ACUTE RENAL FAILURE TYPE (HCC): Primary | ICD-10-CM

## 2022-01-06 PROCEDURE — 99214 OFFICE O/P EST MOD 30 MIN: CPT | Performed by: INTERNAL MEDICINE

## 2022-01-06 NOTE — ASSESSMENT & PLAN NOTE
Better now, but colon reflex is weak  Refer to GI   patient will return from [place] in April  Consider linzess

## 2022-01-06 NOTE — ASSESSMENT & PLAN NOTE
Now better since stoped regular milk  Still some gas  Stopped omeprazole    Refer patient to his gastroenterologist

## 2022-01-06 NOTE — PROGRESS NOTES
Assessment/Plan:         Problem List Items Addressed This Visit        Genitourinary    Acute renal failure ([facility] [address] ) - Primary     gfr 60 from 48 from 67  Bmp reviewed  Improved  He did have kidney stone in the past   Consider CT scan if needed  Recheck in 3 months  Relevant Orders    Basic metabolic panel       Other    Excessive flatus     Now better since stoped regular milk  Still some gas  Stopped omeprazole  Refer patient to his gastroenterologist          Relevant Orders    Ambulatory referral to Gastroenterology    Other constipation     Better now, but colon reflex is weak  Refer to GI   patient will return from [place] in April  Consider linzess  Relevant Orders    Ambulatory referral to Gastroenterology            Subjective:      Patient ID: Montana Sorto is a 73 y o  male  1  Constipation/gas  2  ARF      The following portions of the patient's history were reviewed and updated as appropriate:   Past Medical History:  He has a past medical history of Anxiety, BPH with obstruction/lower urinary tract symptoms, BPH with obstruction/lower urinary tract symptoms, Hypogonadism in male, Lightheadedness, Nocturia, Nocturia, Partial small bowel obstruction ([facility] [address] ), Shoulder injury, left, initial encounter, Sixth cranial nerve palsy, and Testicular pain, left (7/7/2020)  ,  _______________________________________________________________________  Medical Problems:  does not have any pertinent problems on file ,  _______________________________________________________________________  Past Surgical History:   has a past surgical history that includes Tonsillectomy and Nasal sinus surgery (Bilateral)  ,  _______________________________________________________________________  Family History:  family history includes Cancer in his other; Diabetes in his family; Heart disease in his brother;  Other in his other ,  _______________________________________________________________________  Social History:   reports that he quit smoking about 33 years ago  He has quit using smokeless tobacco  He reports current alcohol use of about 1 0 standard drink of alcohol per week  He reports that he does not use drugs  ,  _______________________________________________________________________  Allergies:  is allergic to codeine, pine, and pollen extract     _______________________________________________________________________  Current Outpatient Medications   Medication Sig Dispense Refill    Adapalene-Benzoyl Peroxide (Epiduo Forte) 0 3-2 5 % GEL Apply 1 application topically daily 60 g 1    ALPRAZolam (XANAX) 1 mg tablet Take 1 tablet (1 mg total) by mouth daily as needed for anxiety 15 tablet 0    fluticasone (FLONASE) 50 mcg/act nasal spray 1 spray into each nostril 2 (two) times a day 16 g 2    levothyroxine 25 mcg tablet TAKE 1 TABLET BY MOUTH EVERY DAY 90 tablet 3    tadalafil (CIALIS) 5 MG tablet TAKE 1 TABLET BY MOUTH DAILY FOR SYMPTOMS OF BPH 90 tablet 3    zolpidem (AMBIEN) 10 mg tablet Take 1 tablet (10 mg total) by mouth daily at bedtime as needed for sleep 30 tablet 0    amoxicillin (AMOXIL) 500 mg capsule  (Patient not taking: Reported on 12/2/2021 )      metroNIDAZOLE (METROGEL) 1 % gel Apply topically daily (Patient not taking: Reported on 9/3/2021) 60 g 0    omeprazole (PriLOSEC) 20 mg delayed release capsule Take 1 capsule (20 mg total) by mouth daily before breakfast (Patient not taking: Reported on 1/6/2022 ) 30 capsule 0     No current facility-administered medications for this visit      _______________________________________________________________________  Review of Systems      Objective:  Vitals:    01/06/22 1036   BP: 130/90   BP Location: Left arm   Patient Position: Sitting   Cuff Size: Large   Pulse: 80   Resp: 16   Temp: 97 6 °F (36 4 °C)   TempSrc: Temporal   SpO2: 97%   Weight: 105 kg (231 lb 9 6 oz)   Height: 6' 1" (1 854 m)     Body mass index is 30 56 kg/m²       Physical Exam  Vitals and nursing note reviewed  Constitutional:       General: He is not in acute distress  Appearance: Normal appearance  He is well-developed  HENT:      Head: Normocephalic and atraumatic  Eyes:      General:         Right eye: No discharge  Left eye: No discharge  Neck:      Thyroid: No thyromegaly  Cardiovascular:      Rate and Rhythm: Normal rate and regular rhythm  Pulses: Normal pulses  Heart sounds: Normal heart sounds  No murmur heard  Pulmonary:      Effort: Pulmonary effort is normal       Breath sounds: Normal breath sounds  No wheezing or rhonchi  Abdominal:      General: Bowel sounds are normal  There is no distension  Palpations: Abdomen is soft  Tenderness: There is no abdominal tenderness  Musculoskeletal:      Cervical back: Neck supple  Right lower leg: No edema  Left lower leg: No edema  Lymphadenopathy:      Cervical: No cervical adenopathy  Skin:     General: Skin is warm  Findings: No erythema  Neurological:      Mental Status: He is alert and oriented to person, place, and time  Psychiatric:         Mood and Affect: Mood normal          Behavior: Behavior normal          Thought Content:  Thought content normal

## 2022-01-06 NOTE — ASSESSMENT & PLAN NOTE
gfr 60 from 53 from 72  Bmp reviewed  Improved  He did have kidney stone in the past   Consider CT scan if needed  Recheck in 3 months

## 2022-01-11 ENCOUNTER — TELEPHONE (OUTPATIENT)
Dept: FAMILY MEDICINE CLINIC | Facility: CLINIC | Age: 67
End: 2022-01-11

## 2022-01-11 DIAGNOSIS — G47.00 INSOMNIA, UNSPECIFIED TYPE: ICD-10-CM

## 2022-01-11 DIAGNOSIS — F41.9 ANXIETY: ICD-10-CM

## 2022-01-11 RX ORDER — ZOLPIDEM TARTRATE 10 MG/1
10 TABLET ORAL
Qty: 30 TABLET | Refills: 2 | Status: SHIPPED | OUTPATIENT
Start: 2022-01-11 | End: 2022-05-20

## 2022-01-11 RX ORDER — ALPRAZOLAM 1 MG/1
1 TABLET ORAL DAILY PRN
Qty: 15 TABLET | Refills: 1 | Status: SHIPPED | OUTPATIENT
Start: 2022-01-11 | End: 2022-05-20

## 2022-01-11 NOTE — TELEPHONE ENCOUNTER
[facility] called and stated that pt called the pharmacy requesting the following medication to be refilled , she stated to pt that he needed to call the office , she stated that stated he was not calling the office and that the pharmacy needed to,  She  Is calling to see if we can refill the following medication ( pharmacy change)     ALPRAZolam (XANAX) 1 mg tablet    zolpidem (AMBIEN) 10 mg tablet

## 2022-01-12 DIAGNOSIS — E03.9 HYPOTHYROIDISM, UNSPECIFIED TYPE: ICD-10-CM

## 2022-01-12 RX ORDER — LEVOTHYROXINE SODIUM 0.03 MG/1
25 TABLET ORAL DAILY
Qty: 90 TABLET | Refills: 2 | Status: SHIPPED | OUTPATIENT
Start: 2022-01-12 | End: 2022-07-13

## 2022-02-11 ENCOUNTER — TELEPHONE (OUTPATIENT)
Dept: UROLOGY | Facility: MEDICAL CENTER | Age: 67
End: 2022-02-11

## 2022-02-11 DIAGNOSIS — Z12.5 SCREENING PSA (PROSTATE SPECIFIC ANTIGEN): Primary | ICD-10-CM

## 2022-02-11 DIAGNOSIS — N40.0 ENLARGED PROSTATE WITHOUT LOWER URINARY TRACT SYMPTOMS (LUTS): ICD-10-CM

## 2022-02-11 NOTE — TELEPHONE ENCOUNTER
Patient of Dr Tesha Bruce at [facility]    Patient called stating he needs a psa order to be put into system for him to do prior to appointment in April  He wants to know if he only needs a psa level   Does he get a psa free or so? Please let patient know

## 2022-03-04 DIAGNOSIS — R05.3 CHRONIC COUGH: ICD-10-CM

## 2022-03-04 RX ORDER — FLUTICASONE PROPIONATE 50 MCG
SPRAY, SUSPENSION (ML) NASAL
Qty: 16 G | Refills: 0 | Status: SHIPPED | OUTPATIENT
Start: 2022-03-04 | End: 2022-04-18

## 2022-04-19 ENCOUNTER — OFFICE VISIT (OUTPATIENT)
Dept: GASTROENTEROLOGY | Facility: AMBULARY SURGERY CENTER | Age: 67
End: 2022-04-19
Payer: MEDICARE

## 2022-04-19 ENCOUNTER — APPOINTMENT (OUTPATIENT)
Dept: LAB | Facility: AMBULARY SURGERY CENTER | Age: 67
End: 2022-04-19
Attending: INTERNAL MEDICINE
Payer: MEDICARE

## 2022-04-19 VITALS
WEIGHT: 228 LBS | DIASTOLIC BLOOD PRESSURE: 80 MMHG | HEART RATE: 81 BPM | BODY MASS INDEX: 30.22 KG/M2 | HEIGHT: 73 IN | OXYGEN SATURATION: 97 % | SYSTOLIC BLOOD PRESSURE: 124 MMHG

## 2022-04-19 DIAGNOSIS — R19.4 CHANGE IN BOWEL HABITS: Primary | ICD-10-CM

## 2022-04-19 DIAGNOSIS — R14.0 BLOATING: ICD-10-CM

## 2022-04-19 DIAGNOSIS — L20.84 INTRINSIC (ALLERGIC) ECZEMA: ICD-10-CM

## 2022-04-19 DIAGNOSIS — Z86.010 HISTORY OF COLON POLYPS: ICD-10-CM

## 2022-04-19 DIAGNOSIS — N18.31 STAGE 3A CHRONIC KIDNEY DISEASE (HCC): ICD-10-CM

## 2022-04-19 PROBLEM — Z86.0100 HISTORY OF COLON POLYPS: Status: ACTIVE | Noted: 2022-04-19

## 2022-04-19 PROCEDURE — 86364 TISS TRNSGLTMNASE EA IG CLAS: CPT

## 2022-04-19 PROCEDURE — 99204 OFFICE O/P NEW MOD 45 MIN: CPT | Performed by: INTERNAL MEDICINE

## 2022-04-19 PROCEDURE — 80048 BASIC METABOLIC PNL TOTAL CA: CPT

## 2022-04-19 PROCEDURE — 82784 ASSAY IGA/IGD/IGG/IGM EACH: CPT

## 2022-04-19 PROCEDURE — 36415 COLL VENOUS BLD VENIPUNCTURE: CPT

## 2022-04-19 PROCEDURE — 86231 EMA EACH IG CLASS: CPT

## 2022-04-19 PROCEDURE — 86258 DGP ANTIBODY EACH IG CLASS: CPT

## 2022-04-19 PROCEDURE — 86003 ALLG SPEC IGE CRUDE XTRC EA: CPT

## 2022-04-19 PROCEDURE — 82785 ASSAY OF IGE: CPT

## 2022-04-19 RX ORDER — SODIUM PICOSULFATE, MAGNESIUM OXIDE, AND ANHYDROUS CITRIC ACID 10; 3.5; 12 MG/160ML; G/160ML; G/160ML
1 LIQUID ORAL SEE ADMIN INSTRUCTIONS
Qty: 320 ML | Refills: 0 | Status: SHIPPED | OUTPATIENT
Start: 2022-04-19 | End: 2022-05-20

## 2022-04-19 NOTE — PATIENT INSTRUCTIONS
Scheduled date of EGD/colonoscopy (as of today):7/13/2022  Physician performing EGD/colonoscopy:DR GARCIA  Location of EGD/colonoscopy:[facility]  Desired bowel prep reviewed with patient:ORALIA PER DR Joel Parada  Instructions reviewed with patient by:INDY DOVER  Clearances:

## 2022-04-19 NOTE — ASSESSMENT & PLAN NOTE
Appear to be functional from irritable bowel syndrome but should rule out colorectal lesions including polyps or malignancy  Probable diet related     -check celiac disease panel and food allergy profile    -advised about low FODMAP diet    -Schedule for colonoscopy  -Patient was given instructions about the colonoscopy prep     -Patient was explained about  the risks and benefits of the procedure  Risks including but not limited to bleeding, infection, perforation were explained in detail  Also explained about less than 100% sensitivity with the exam and other alternatives

## 2022-04-19 NOTE — PROGRESS NOTES
Consultation - [facility]) [facility]  Janel Bhargavmaddy 1955 male         Chief Complaint:  Change in bowel habits    HPI:  27-year-old male with no significant past medical history reports having symptoms of constipation started few months ago  He took laxatives in the beginning with help  Complaining about some discomfort in the stomach with gurgling and episodes of constipation with straining and also loose bowels at times  Denies any blood or mucus in the stool  Complaining about bloating with gassiness  He stopped drinking milk without any significant help  He reports having had symptoms of itching with feeling of bugs crawling under the skin  He had colonoscopy 3 years ago and had polyps removed    REVIEW OF SYSTEMS: Review of Systems   Constitutional: Negative for activity change, appetite change, chills, diaphoresis, fatigue, fever and unexpected weight change  HENT: Negative for ear discharge, ear pain, facial swelling, hearing loss, nosebleeds, sore throat, tinnitus and voice change  Eyes: Negative for pain, discharge, redness, itching and visual disturbance  Respiratory: Negative for apnea, cough, chest tightness, shortness of breath and wheezing  Cardiovascular: Negative for chest pain and palpitations  Gastrointestinal:        As noted in HPI   Endocrine: Negative for cold intolerance, heat intolerance and polyuria  Genitourinary: Negative for difficulty urinating, dysuria, flank pain, hematuria and urgency  Musculoskeletal: Negative for arthralgias, back pain, gait problem, joint swelling and myalgias  Skin: Negative for rash and wound  Neurological: Negative for dizziness, tremors, seizures, speech difficulty, light-headedness, numbness and headaches  Hematological: Negative for adenopathy  Does not bruise/bleed easily  Psychiatric/Behavioral: Negative for agitation, behavioral problems and confusion  The patient is not nervous/anxious           Past Medical History:   Diagnosis Date    Anxiety     BPH with obstruction/lower urinary tract symptoms     BPH with obstruction/lower urinary tract symptoms     Hypogonadism in male     Lightheadedness     last assessed-2015    Nocturia     Nocturia     Partial small bowel obstruction (HCC)     last assessed-2016    Shoulder injury, left, initial encounter     resolved:2017    Sixth cranial nerve palsy     last assessed-2015    Testicular pain, left 2020      Past Surgical History:   Procedure Laterality Date    NASAL SINUS SURGERY Bilateral     TONSILLECTOMY       Social History     Socioeconomic History    Marital status: /Civil Union     Spouse name: Not on file    Number of children: Not on file    Years of education: Not on file    Highest education level: Not on file   Occupational History    Not on file   Tobacco Use    Smoking status: Former Smoker     Quit date: 1988     Years since quittin 8    Smokeless tobacco: Former User   Vaping Use    Vaping Use: Never used   Substance and Sexual Activity    Alcohol use:  Yes     Alcohol/week: 1 0 standard drink     Types: 1 Cans of beer per week     Comment: social    Drug use: No    Sexual activity: Yes     Partners: Female     Comment: no comment   Other Topics Concern    Not on file   Social History Narrative    Daily caffeine consumption     Social Determinants of Health     Financial Resource Strain: Not on file   Food Insecurity: Not on file   Transportation Needs: Not on file   Physical Activity: Not on file   Stress: Not on file   Social Connections: Not on file   Intimate Partner Violence: Not on file   Housing Stability: Not on file     Family History   Problem Relation Age of Onset    Cancer Other     Diabetes Family     Other Other         back disorder    Heart disease Brother      Codeine, Pine, and Pollen extract  Current Outpatient Medications   Medication Sig Dispense Refill    levothyroxine 25 mcg tablet Take 1 tablet (25 mcg total) by mouth daily 90 tablet 2    Adapalene-Benzoyl Peroxide (Epiduo Forte) 0 3-2 5 % GEL Apply 1 application topically daily (Patient not taking: Reported on 4/19/2022 ) 60 g 1    ALPRAZolam (XANAX) 1 mg tablet Take 1 tablet (1 mg total) by mouth daily as needed for anxiety (Patient not taking: Reported on 4/19/2022 ) 15 tablet 1    metroNIDAZOLE (METROGEL) 1 % gel Apply topically daily (Patient not taking: Reported on 9/3/2021) 60 g 0    omeprazole (PriLOSEC) 20 mg delayed release capsule Take 1 capsule (20 mg total) by mouth daily before breakfast (Patient not taking: Reported on 1/6/2022 ) 30 capsule 0    Sod Picosulfate-Mag Ox-Cit Acd (Clenpiq) 10-3 5-12 MG-GM -GM/160ML SOLN Take 1 Package by mouth see administration instructions 320 mL 0    zolpidem (AMBIEN) 10 mg tablet Take 1 tablet (10 mg total) by mouth daily at bedtime as needed for sleep (Patient not taking: Reported on 4/19/2022 ) 30 tablet 2     No current facility-administered medications for this visit  Blood pressure 124/80, pulse 81, height 6' 1" (1 854 m), weight 103 kg (228 lb), SpO2 97 %  PHYSICAL EXAM: Physical Exam  Constitutional:       Appearance: He is well-developed  HENT:      Head: Normocephalic and atraumatic  Eyes:      General: No scleral icterus  Right eye: No discharge  Left eye: No discharge  Conjunctiva/sclera: Conjunctivae normal       Pupils: Pupils are equal, round, and reactive to light  Neck:      Thyroid: No thyromegaly  Vascular: No JVD  Trachea: No tracheal deviation  Cardiovascular:      Rate and Rhythm: Normal rate and regular rhythm  Heart sounds: Normal heart sounds  No murmur heard  No friction rub  No gallop  Pulmonary:      Effort: Pulmonary effort is normal  No respiratory distress  Breath sounds: Normal breath sounds  No wheezing or rales  Chest:      Chest wall: No tenderness     Abdominal: General: Bowel sounds are normal  There is no distension  Palpations: Abdomen is soft  There is no mass  Tenderness: There is no abdominal tenderness  There is no guarding or rebound  Hernia: No hernia is present  Musculoskeletal:      Cervical back: Neck supple  Lymphadenopathy:      Cervical: No cervical adenopathy  Skin:     General: Skin is warm and dry  Findings: No erythema or rash  Neurological:      Mental Status: He is alert and oriented to person, place, and time  Psychiatric:         Behavior: Behavior normal          Thought Content: Thought content normal           Lab Results   Component Value Date    WBC 5 63 11/19/2021    HGB 14 7 11/19/2021    HCT 43 6 11/19/2021    MCV 88 11/19/2021     11/19/2021     Lab Results   Component Value Date    GLUCOSE 95 08/11/2015    CALCIUM 8 8 12/30/2021     12/07/2017    K 4 1 12/30/2021    CO2 29 12/30/2021     12/30/2021    BUN 14 12/30/2021    CREATININE 1 25 12/30/2021     Lab Results   Component Value Date    ALT 22 11/19/2021    AST 25 11/19/2021    ALKPHOS 71 11/19/2021    BILITOT 0 4 12/07/2017     Lab Results   Component Value Date    INR 0 94 08/11/2015    PROTIME 12 4 08/11/2015       XR abdomen obstruction series    Result Date: 11/19/2021  Impression: No acute cardiopulmonary disease Nonobstructive bowel gas pattern  Workstation performed: [id number]       ASSESSMENT & PLAN:    Change in bowel habits  Appear to be functional from irritable bowel syndrome but should rule out colorectal lesions including polyps or malignancy  Probable diet related     -check celiac disease panel and food allergy profile    -advised about low FODMAP diet    -Schedule for colonoscopy  -Patient was given instructions about the colonoscopy prep     -Patient was explained about  the risks and benefits of the procedure  Risks including but not limited to bleeding, infection, perforation were explained in detail   Also explained about less than 100% sensitivity with the exam and other alternatives  Bloating    -treatment as described above    -schedule for EGD    History of colon polyps  Personal history of colon polyps- patient is at increased risk for colon cancer screening    Rule out colorectal lesions including polyps or malignancy     -colonoscopy

## 2022-04-19 NOTE — ASSESSMENT & PLAN NOTE
Personal history of colon polyps- patient is at increased risk for colon cancer screening    Rule out colorectal lesions including polyps or malignancy     -colonoscopy

## 2022-04-20 ENCOUNTER — OFFICE VISIT (OUTPATIENT)
Dept: FAMILY MEDICINE CLINIC | Facility: CLINIC | Age: 67
End: 2022-04-20
Payer: MEDICARE

## 2022-04-20 VITALS
HEIGHT: 73 IN | SYSTOLIC BLOOD PRESSURE: 110 MMHG | BODY MASS INDEX: 30.03 KG/M2 | WEIGHT: 226.6 LBS | DIASTOLIC BLOOD PRESSURE: 70 MMHG | HEART RATE: 78 BPM | OXYGEN SATURATION: 98 % | RESPIRATION RATE: 16 BRPM | TEMPERATURE: 97.1 F

## 2022-04-20 DIAGNOSIS — R14.3 EXCESSIVE FLATUS: ICD-10-CM

## 2022-04-20 DIAGNOSIS — E03.9 HYPOTHYROIDISM, UNSPECIFIED TYPE: Primary | ICD-10-CM

## 2022-04-20 DIAGNOSIS — N18.31 STAGE 3A CHRONIC KIDNEY DISEASE (HCC): ICD-10-CM

## 2022-04-20 LAB
ALMOND IGE QN: <0.1 KUA/I
ANION GAP SERPL CALCULATED.3IONS-SCNC: 8 MMOL/L (ref 4–13)
BUN SERPL-MCNC: 10 MG/DL (ref 5–25)
CALCIUM SERPL-MCNC: 10.2 MG/DL (ref 8.3–10.1)
CASHEW NUT IGE QN: <0.1 KUA/I
CHLORIDE SERPL-SCNC: 107 MMOL/L (ref 100–108)
CO2 SERPL-SCNC: 27 MMOL/L (ref 21–32)
CODFISH IGE QN: <0.1 KUA/I
CREAT SERPL-MCNC: 1.18 MG/DL (ref 0.6–1.3)
EGG WHITE IGE QN: <0.1 KUA/I
ENDOMYSIUM IGA SER QL: NEGATIVE
GFR SERPL CREATININE-BSD FRML MDRD: 64 ML/MIN/1.73SQ M
GLIADIN PEPTIDE IGA SER-ACNC: 6 UNITS (ref 0–19)
GLIADIN PEPTIDE IGG SER-ACNC: 2 UNITS (ref 0–19)
GLUCOSE SERPL-MCNC: 102 MG/DL (ref 65–140)
GLUTEN IGE QN: <0.1 KUA/I
HAZELNUT IGE QN: <0.1 KUA/L
IGA SERPL-MCNC: 234 MG/DL (ref 61–437)
MILK IGE QN: <0.1 KUA/I
PEANUT IGE QN: <0.1 KUA/I
POTASSIUM SERPL-SCNC: 4.5 MMOL/L (ref 3.5–5.3)
SALMON IGE QN: <0.1 KUA/I
SCALLOP IGE QN: <0.1 KUA/L
SESAME SEED IGE QN: <0.1 KUA/I
SHRIMP IGE QN: <0.1 KUA/L
SODIUM SERPL-SCNC: 142 MMOL/L (ref 136–145)
SOYBEAN IGE QN: <0.1 KUA/I
TOTAL IGE SMQN RAST: 5.97 KU/L (ref 0–113)
TTG IGA SER-ACNC: <2 U/ML (ref 0–3)
TTG IGG SER-ACNC: <2 U/ML (ref 0–5)
TUNA IGE QN: <0.1 KUA/I
WALNUT IGE QN: <0.1 KUA/I
WHEAT IGE QN: <0.1 KUA/I

## 2022-04-20 PROCEDURE — 99214 OFFICE O/P EST MOD 30 MIN: CPT | Performed by: INTERNAL MEDICINE

## 2022-04-20 NOTE — PROGRESS NOTES
BMI Counseling: Body mass index is 29 9 kg/m²  The BMI is above normal  Nutrition recommendations include decreasing portion sizes, decreasing fast food intake, consuming healthier snacks, limiting drinks that contain sugar, moderation in carbohydrate intake and reducing intake of cholesterol  Exercise recommendations include exercising 3-5 times per week  Rationale for BMI follow-up plan is due to patient being overweight or obese  Depression Screening and Follow-up Plan: Patient was screened for depression during today's encounter  They screened negative with a PHQ-2 score of 0  Falls Plan of Care: balance, strength, and gait training instructions were provided  Assessment/Plan:         Problem List Items Addressed This Visit        Endocrine    Hypothyroidism - Primary       Genitourinary    Stage 3a chronic kidney disease ([facility] [address] )     Lab Results   Component Value Date    EGFR 60 12/30/2021    EGFR 53 11/19/2021    EGFR >60 0 04/06/2016    CREATININE 1 25 12/30/2021    CREATININE 1 39 (H) 11/19/2021    CREATININE 1 05 07/12/2021   recheck bmp         Relevant Orders    Basic metabolic panel       Other    Excessive flatus            Subjective:      Patient ID: Erika Lantigua is a 73 y o  male  1  ckd- I will add BNP to his labs blood drawn yesterday  No trouble urinating  No recent stone passed  No abdominal pain  No hematuria  2  Hypothyroidism- no increased fatigue  No cold or heat intolerance  No hair loss  No diarrhea  Sometimes loose bowel movement    3  Gas- seen by gastroenterologist   Workup in progress      The following portions of the patient's history were reviewed and updated as appropriate:   Past Medical History:  He has a past medical history of Anxiety, BPH with obstruction/lower urinary tract symptoms, BPH with obstruction/lower urinary tract symptoms, Hypogonadism in male, Lightheadedness, Nocturia, Nocturia, Partial small bowel obstruction ([address] ), Shoulder injury, left, initial encounter, Sixth cranial nerve palsy, and Testicular pain, left (7/7/2020)  ,  _______________________________________________________________________  Medical Problems:  does not have any pertinent problems on file ,  _______________________________________________________________________  Past Surgical History:   has a past surgical history that includes Tonsillectomy and Nasal sinus surgery (Bilateral)  ,  _______________________________________________________________________  Family History:  family history includes Cancer in his other; Diabetes in his family; Heart disease in his brother; Other in his other ,  _______________________________________________________________________  Social History:   reports that he quit smoking about 33 years ago  He has quit using smokeless tobacco  He reports current alcohol use of about 1 0 standard drink of alcohol per week  He reports that he does not use drugs  ,  _______________________________________________________________________  Allergies:  is allergic to codeine, pine, and pollen extract     _______________________________________________________________________  Current Outpatient Medications   Medication Sig Dispense Refill    levothyroxine 25 mcg tablet Take 1 tablet (25 mcg total) by mouth daily 90 tablet 2    Adapalene-Benzoyl Peroxide (Epiduo Forte) 0 3-2 5 % GEL Apply 1 application topically daily (Patient not taking: Reported on 4/19/2022 ) 60 g 1    ALPRAZolam (XANAX) 1 mg tablet Take 1 tablet (1 mg total) by mouth daily as needed for anxiety (Patient not taking: Reported on 4/19/2022 ) 15 tablet 1    metroNIDAZOLE (METROGEL) 1 % gel Apply topically daily (Patient not taking: Reported on 9/3/2021) 60 g 0    omeprazole (PriLOSEC) 20 mg delayed release capsule Take 1 capsule (20 mg total) by mouth daily before breakfast (Patient not taking: Reported on 1/6/2022 ) 30 capsule 0    Sod Picosulfate-Mag Ox-Cit Acd (Clenpiq) 10-3 5-12 MG-GM -GM/160ML SOLN Take 1 Package by mouth see administration instructions 320 mL 0    zolpidem (AMBIEN) 10 mg tablet Take 1 tablet (10 mg total) by mouth daily at bedtime as needed for sleep (Patient not taking: Reported on 4/19/2022 ) 30 tablet 2     No current facility-administered medications for this visit      _______________________________________________________________________  Review of Systems      Objective:  Vitals:    04/20/22 1031   BP: 110/70   BP Location: Left arm   Patient Position: Sitting   Cuff Size: Large   Pulse: 78   Resp: 16   Temp: (!) 97 1 °F (36 2 °C)   TempSrc: Temporal   SpO2: 98%   Weight: 103 kg (226 lb 9 6 oz)   Height: 6' 1" (1 854 m)     Body mass index is 29 9 kg/m²  Physical Exam  Vitals and nursing note reviewed  Constitutional:       General: He is not in acute distress  Appearance: Normal appearance  He is well-developed  HENT:      Head: Normocephalic and atraumatic  Eyes:      General:         Right eye: No discharge  Left eye: No discharge  Neck:      Thyroid: No thyromegaly  Cardiovascular:      Rate and Rhythm: Normal rate and regular rhythm  Pulses: Normal pulses  Heart sounds: Normal heart sounds  No murmur heard  Pulmonary:      Effort: Pulmonary effort is normal       Breath sounds: Normal breath sounds  No wheezing or rhonchi  Abdominal:      General: Bowel sounds are normal  There is no distension  Palpations: Abdomen is soft  Tenderness: There is no abdominal tenderness  Musculoskeletal:         General: No swelling or tenderness  Cervical back: Neck supple  Right lower leg: No edema  Left lower leg: No edema  Lymphadenopathy:      Cervical: No cervical adenopathy  Skin:     General: Skin is warm  Capillary Refill: Capillary refill takes less than 2 seconds  Findings: No erythema  Neurological:      General: No focal deficit present        Mental Status: He is alert and oriented to person, place, and time  Psychiatric:         Mood and Affect: Mood normal          Behavior: Behavior normal          Thought Content:  Thought content normal

## 2022-04-20 NOTE — ASSESSMENT & PLAN NOTE
Seen by gastroenterologist   An endoscopy and colonoscopy scheduling  Celiac panel with food allergy test were normal   Stopping omeprazole did not help much  He is drinking almond milk now  Still having similar symptoms    Considered gluten free diet

## 2022-04-20 NOTE — ASSESSMENT & PLAN NOTE
Lab Results   Component Value Date    EGFR 60 12/30/2021    EGFR 53 11/19/2021    EGFR >60 0 04/06/2016    CREATININE 1 25 12/30/2021    CREATININE 1 39 (H) 11/19/2021    CREATININE 1 05 07/12/2021   recheck bmp

## 2022-05-11 ENCOUNTER — APPOINTMENT (OUTPATIENT)
Dept: LAB | Facility: CLINIC | Age: 67
End: 2022-05-11
Payer: MEDICARE

## 2022-05-11 DIAGNOSIS — Z12.5 SCREENING PSA (PROSTATE SPECIFIC ANTIGEN): ICD-10-CM

## 2022-05-11 DIAGNOSIS — N40.0 ENLARGED PROSTATE WITHOUT LOWER URINARY TRACT SYMPTOMS (LUTS): ICD-10-CM

## 2022-05-11 LAB — PSA SERPL-MCNC: 1.8 NG/ML (ref 0–4)

## 2022-05-11 PROCEDURE — 36415 COLL VENOUS BLD VENIPUNCTURE: CPT

## 2022-05-11 PROCEDURE — G0103 PSA SCREENING: HCPCS

## 2022-05-20 ENCOUNTER — OFFICE VISIT (OUTPATIENT)
Dept: UROLOGY | Facility: MEDICAL CENTER | Age: 67
End: 2022-05-20
Payer: MEDICARE

## 2022-05-20 VITALS
HEART RATE: 80 BPM | WEIGHT: 225 LBS | BODY MASS INDEX: 29.82 KG/M2 | HEIGHT: 73 IN | SYSTOLIC BLOOD PRESSURE: 128 MMHG | DIASTOLIC BLOOD PRESSURE: 84 MMHG | OXYGEN SATURATION: 98 %

## 2022-05-20 DIAGNOSIS — N13.8 BPH WITH OBSTRUCTION/LOWER URINARY TRACT SYMPTOMS: Primary | ICD-10-CM

## 2022-05-20 DIAGNOSIS — N40.1 BPH WITH OBSTRUCTION/LOWER URINARY TRACT SYMPTOMS: Primary | ICD-10-CM

## 2022-05-20 PROCEDURE — 99214 OFFICE O/P EST MOD 30 MIN: CPT | Performed by: UROLOGY

## 2022-05-20 NOTE — ASSESSMENT & PLAN NOTE
AUA symptom score is 12  He is satisfied his voiding pattern  Most recent PSA was stable at 1 8 (May 11, 2022)  Options were discussed and will continue follow his voiding pattern watchful waiting  He will return in 1 year and we will plan to recheck a PSA and obtain a urinalysis prior to that visit

## 2022-05-20 NOTE — PROGRESS NOTES
Assessment/Plan:    BPH with obstruction/lower urinary tract symptoms  AUA symptom score is 12  He is satisfied his voiding pattern  Most recent PSA was stable at 1 8 (May 11, 2022)  Options were discussed and will continue follow his voiding pattern watchful waiting  He will return in 1 year and we will plan to recheck a PSA and obtain a urinalysis prior to that visit  Diagnoses and all orders for this visit:    BPH with obstruction/lower urinary tract symptoms  -     PSA Total, Diagnostic; Future  -     Urinalysis with microscopic; Future          Subjective:      Patient ID: Karli Hawkins is a 73 y o  male  Benign Prostatic Hypertrophy  This is a chronic problem  The current episode started more than 1 year ago  The problem has been gradually worsening since onset  Irritative symptoms include nocturia (NOcturia X 2)  Irritative symptoms do not include frequency or urgency  Obstructive symptoms include dribbling and a slower stream  Obstructive symptoms do not include incomplete emptying, an intermittent stream, straining or a weak stream  Pertinent negatives include no chills, dysuria, genital pain, hematuria, hesitancy, nausea or vomiting  AUA score is 8-19  His sexual activity is non-contributory to the current illness  Nothing aggravates the symptoms  Past treatments include nothing  The following portions of the patient's history were reviewed and updated as appropriate: allergies, current medications, past family history, past medical history, past social history, past surgical history and problem list     Review of Systems   Constitutional: Negative for chills, diaphoresis, fatigue and fever  HENT: Negative  Eyes: Negative  Respiratory: Negative  Cardiovascular: Negative  Gastrointestinal: Positive for constipation  Negative for blood in stool, nausea and vomiting  Seeing GI for change in Bowel habits   Endocrine: Negative      Genitourinary: Positive for nocturia (NOcturia X 2)  Negative for dysuria, frequency, hematuria, hesitancy, incomplete emptying, testicular pain and urgency  See HPI   Musculoskeletal: Negative  Skin: Negative  Allergic/Immunologic: Negative  Neurological: Negative  Hematological: Negative  Psychiatric/Behavioral: Negative  AUA SYMPTOM SCORE    Flowsheet Row Most Recent Value   AUA SYMPTOM SCORE    How often have you had a sensation of not emptying your bladder completely after you finished urinating? 1   How often have you had to urinate again less than two hours after you finished urinating? 2   How often have you found you stopped and started again several times when you urinate? 3   How often have you found it difficult to postpone urination? 1   How often have you had a weak urinary stream? 3   How often have you had to push or strain to begin urination? 0   How many times did you most typically get up to urinate from the time you went to bed at night until the time you got up in the morning? 2   Quality of Life: If you were to spend the rest of your life with your urinary condition just the way it is now, how would you feel about that? 2   AUA SYMPTOM SCORE 12        Objective:      /84   Pulse 80   Ht 6' 1" (1 854 m)   Wt 102 kg (225 lb)   SpO2 98%   BMI 29 69 kg/m²          Physical Exam  Constitutional:       General: He is not in acute distress  Appearance: Normal appearance  He is well-developed and normal weight  He is not ill-appearing, toxic-appearing or diaphoretic  HENT:      Head: Normocephalic and atraumatic  Eyes:      General: No scleral icterus  Conjunctiva/sclera: Conjunctivae normal    Cardiovascular:      Rate and Rhythm: Normal rate  Pulmonary:      Effort: Pulmonary effort is normal    Abdominal:      General: Bowel sounds are normal  There is no distension  Palpations: Abdomen is soft  There is no mass  Tenderness: There is no abdominal tenderness   There is no right CVA tenderness, left CVA tenderness, guarding or rebound  Hernia: No hernia is present  Genitourinary:     Penis: Normal  No phimosis or hypospadias  Testes: Normal          Right: Mass not present  Left: Mass not present  Rectum: Normal       Comments: Fullness left epididymal head/? spermatocele  No testicular mass  Prostate 1 5 + enlarged and palpably benign  Musculoskeletal:      Cervical back: Neck supple  Skin:     General: Skin is warm and dry  Neurological:      General: No focal deficit present  Mental Status: He is alert and oriented to person, place, and time  Psychiatric:         Mood and Affect: Mood normal          Behavior: Behavior normal          Thought Content:  Thought content normal          Judgment: Judgment normal

## 2022-07-13 ENCOUNTER — ANESTHESIA (OUTPATIENT)
Dept: GASTROENTEROLOGY | Facility: AMBULARY SURGERY CENTER | Age: 67
End: 2022-07-13

## 2022-07-13 ENCOUNTER — ANESTHESIA EVENT (OUTPATIENT)
Dept: GASTROENTEROLOGY | Facility: AMBULARY SURGERY CENTER | Age: 67
End: 2022-07-13

## 2022-07-13 ENCOUNTER — HOSPITAL ENCOUNTER (OUTPATIENT)
Dept: GASTROENTEROLOGY | Facility: AMBULARY SURGERY CENTER | Age: 67
Setting detail: OUTPATIENT SURGERY
Discharge: HOME | End: 2022-07-13
Attending: INTERNAL MEDICINE | Admitting: INTERNAL MEDICINE
Payer: MEDICARE

## 2022-07-13 VITALS
WEIGHT: 220 LBS | SYSTOLIC BLOOD PRESSURE: 113 MMHG | TEMPERATURE: 97.2 F | HEART RATE: 79 BPM | DIASTOLIC BLOOD PRESSURE: 56 MMHG | RESPIRATION RATE: 26 BRPM | OXYGEN SATURATION: 98 % | HEIGHT: 73 IN | BODY MASS INDEX: 29.16 KG/M2

## 2022-07-13 DIAGNOSIS — R19.4 CHANGE IN BOWEL HABITS: ICD-10-CM

## 2022-07-13 DIAGNOSIS — R14.0 BLOATING: ICD-10-CM

## 2022-07-13 PROCEDURE — 43239 EGD BIOPSY SINGLE/MULTIPLE: CPT | Performed by: INTERNAL MEDICINE

## 2022-07-13 PROCEDURE — 88305 TISSUE EXAM BY PATHOLOGIST: CPT | Performed by: PATHOLOGY

## 2022-07-13 PROCEDURE — 45378 DIAGNOSTIC COLONOSCOPY: CPT | Performed by: INTERNAL MEDICINE

## 2022-07-13 RX ORDER — PROPOFOL 10 MG/ML
INJECTION, EMULSION INTRAVENOUS AS NEEDED
Status: DISCONTINUED | OUTPATIENT
Start: 2022-07-13 | End: 2022-07-13

## 2022-07-13 RX ORDER — SODIUM CHLORIDE, SODIUM LACTATE, POTASSIUM CHLORIDE, CALCIUM CHLORIDE 600; 310; 30; 20 MG/100ML; MG/100ML; MG/100ML; MG/100ML
INJECTION, SOLUTION INTRAVENOUS CONTINUOUS PRN
Status: DISCONTINUED | OUTPATIENT
Start: 2022-07-13 | End: 2022-07-13

## 2022-07-13 RX ORDER — LIDOCAINE HYDROCHLORIDE 10 MG/ML
INJECTION, SOLUTION EPIDURAL; INFILTRATION; INTRACAUDAL; PERINEURAL AS NEEDED
Status: DISCONTINUED | OUTPATIENT
Start: 2022-07-13 | End: 2022-07-13

## 2022-07-13 RX ADMIN — PROPOFOL 50 MG: 10 INJECTION, EMULSION INTRAVENOUS at 09:31

## 2022-07-13 RX ADMIN — PROPOFOL 20 MG: 10 INJECTION, EMULSION INTRAVENOUS at 09:37

## 2022-07-13 RX ADMIN — SODIUM CHLORIDE, SODIUM LACTATE, POTASSIUM CHLORIDE, AND CALCIUM CHLORIDE: .6; .31; .03; .02 INJECTION, SOLUTION INTRAVENOUS at 09:18

## 2022-07-13 RX ADMIN — PROPOFOL 20 MG: 10 INJECTION, EMULSION INTRAVENOUS at 09:34

## 2022-07-13 RX ADMIN — PROPOFOL 20 MG: 10 INJECTION, EMULSION INTRAVENOUS at 09:35

## 2022-07-13 RX ADMIN — PROPOFOL 20 MG: 10 INJECTION, EMULSION INTRAVENOUS at 09:33

## 2022-07-13 RX ADMIN — PROPOFOL 20 MG: 10 INJECTION, EMULSION INTRAVENOUS at 09:39

## 2022-07-13 RX ADMIN — PROPOFOL 200 MG: 10 INJECTION, EMULSION INTRAVENOUS at 09:29

## 2022-07-13 RX ADMIN — LIDOCAINE HYDROCHLORIDE 50 MG: 10 INJECTION, SOLUTION EPIDURAL; INFILTRATION; INTRACAUDAL at 09:29

## 2022-07-13 RX ADMIN — PROPOFOL 20 MG: 10 INJECTION, EMULSION INTRAVENOUS at 09:36

## 2022-07-13 NOTE — ANESTHESIA PREPROCEDURE EVALUATION
Procedure:  COLONOSCOPY  EGD    Relevant Problems   CARDIO   (+) Hyperlipidemia      ENDO   (+) Hypothyroidism      /RENAL   (+) Acute renal failure (HCC)   (+) BPH with obstruction/lower urinary tract symptoms   (+) Stage 3a chronic kidney disease (HCC)      MUSCULOSKELETAL   (+) Chronic left-sided low back pain   (+) Spondylosis of cervical region without myelopathy or radiculopathy      NEURO/PSYCH   (+) Anxiety   (+) Chronic left-sided low back pain   (+) History of colon polyps      PULMONARY   (+) Obstructive sleep apnea      Adequately NPO  No prior anesthesia complications  Physical Exam    Airway  Comment: goatee  Mallampati score: III  TM Distance: >3 FB  Neck ROM: full     Dental   No notable dental hx     Cardiovascular  Rhythm: regular, Rate: normal,     Pulmonary  Pulmonary exam normal     Other Findings        Anesthesia Plan  ASA Score- 3     Anesthesia Type- IV sedation with anesthesia with ASA Monitors  Additional Monitors:   Airway Plan:     Comment: Spontaneous with supplemental O2  Plan Factors-Exercise tolerance (METS): >4 METS  Chart reviewed  EKG reviewed  Existing labs reviewed  Patient summary reviewed  Patient is not a current smoker  Obstructive sleep apnea risk education given perioperatively  Induction- intravenous  Postoperative Plan-     Informed Consent- Anesthetic plan and risks discussed with patient  I personally reviewed this patient with the CRNA  Discussed and agreed on the Anesthesia Plan with the CRNA  Gerhardt Sep

## 2022-07-13 NOTE — ANESTHESIA POSTPROCEDURE EVALUATION
Post-Op Assessment Note    CV Status:  Stable  Pain Score: 0    Pain management: adequate     Mental Status:  Alert and awake   Hydration Status:  Euvolemic   PONV Controlled:  Controlled   Airway Patency:  Patent      Post Op Vitals Reviewed: Yes      Staff: CRNA         No complications documented      BP   116/67   Temp 97   Pulse 73   Resp 12   SpO2 98

## 2022-07-13 NOTE — H&P
History and Physical - [facility]  Brittney Ayala 73 y o  male MRN: [id number]        HPI:  27-year-old male with history colon polyps was seen in the office because of change in bowel habits and bloating  Historical Information   Past Medical History:   Diagnosis Date    Anxiety     BPH with obstruction/lower urinary tract symptoms     BPH with obstruction/lower urinary tract symptoms     Hypogonadism in male     Lightheadedness     last assessed-2015    Nocturia     Nocturia     Partial small bowel obstruction (HCC)     last assessed-2016    Shoulder injury, left, initial encounter     resolved:2017    Sixth cranial nerve palsy     last assessed-2015    Testicular pain, left 2020     Past Surgical History:   Procedure Laterality Date    NASAL SINUS SURGERY Bilateral     TONSILLECTOMY       Social History   Social History     Substance and Sexual Activity   Alcohol Use Yes    Alcohol/week: 1 0 standard drink    Types: 1 Cans of beer per week    Comment: social     Social History     Substance and Sexual Activity   Drug Use No     Social History     Tobacco Use   Smoking Status Former Smoker    Quit date: 1988    Years since quittin 1   Smokeless Tobacco Former User     Family History   Problem Relation Age of Onset    Cancer Other     Diabetes Family     Other Other         back disorder    Heart disease Brother        Meds/Allergies     (Not in a hospital admission)      Allergies   Allergen Reactions    Codeine GI Intolerance    Pine Allergic Rhinitis    Pollen Extract Allergic Rhinitis       Objective     There were no vitals taken for this visit      PHYSICAL EXAM:    Gen: NAD  CV: S1 & S2 normal, RRR  CHEST: Clear to auscultate  ABD: soft, NT/ND, good bowel sounds  EXT: no edema    ASSESSMENT:     Dyspepsia, history of colon polyps, change in bowel habits    PLAN:    EGD and colonoscopy

## 2022-07-19 ENCOUNTER — TELEPHONE (OUTPATIENT)
Dept: DERMATOLOGY | Facility: CLINIC | Age: 67
End: 2022-07-19

## 2022-08-11 ENCOUNTER — TELEPHONE (OUTPATIENT)
Dept: FAMILY MEDICINE CLINIC | Facility: CLINIC | Age: 67
End: 2022-08-11

## 2022-08-11 ENCOUNTER — OFFICE VISIT (OUTPATIENT)
Dept: FAMILY MEDICINE CLINIC | Facility: CLINIC | Age: 67
End: 2022-08-11
Payer: MEDICARE

## 2022-08-11 VITALS
DIASTOLIC BLOOD PRESSURE: 76 MMHG | BODY MASS INDEX: 29.03 KG/M2 | WEIGHT: 219 LBS | OXYGEN SATURATION: 97 % | HEIGHT: 73 IN | TEMPERATURE: 97.2 F | SYSTOLIC BLOOD PRESSURE: 104 MMHG | HEART RATE: 76 BPM

## 2022-08-11 DIAGNOSIS — L70.0 ACNE VULGARIS: ICD-10-CM

## 2022-08-11 DIAGNOSIS — E55.9 VITAMIN D DEFICIENCY: ICD-10-CM

## 2022-08-11 DIAGNOSIS — E51.9 THIAMINE DEFICIENCY: ICD-10-CM

## 2022-08-11 DIAGNOSIS — E56.0 VITAMIN E DEFICIENCY: ICD-10-CM

## 2022-08-11 DIAGNOSIS — E54 ASCORBIC ACID DEFICIENCY: ICD-10-CM

## 2022-08-11 DIAGNOSIS — E50.9 VITAMIN A DEFICIENCY: ICD-10-CM

## 2022-08-11 DIAGNOSIS — L71.9 ROSACEA: ICD-10-CM

## 2022-08-11 DIAGNOSIS — E53.8 CYANOCOBALAMIN DEFICIENCY: ICD-10-CM

## 2022-08-11 DIAGNOSIS — L71.9 ROSACEA: Primary | ICD-10-CM

## 2022-08-11 DIAGNOSIS — E03.9 HYPOTHYROIDISM, UNSPECIFIED TYPE: ICD-10-CM

## 2022-08-11 DIAGNOSIS — N18.9 CHRONIC KIDNEY DISEASE, UNSPECIFIED CKD STAGE: ICD-10-CM

## 2022-08-11 DIAGNOSIS — E60 ZINC DEFICIENCY: ICD-10-CM

## 2022-08-11 DIAGNOSIS — E53.1 PYRIDOXINE DEFICIENCY: ICD-10-CM

## 2022-08-11 DIAGNOSIS — R53.83 OTHER FATIGUE: ICD-10-CM

## 2022-08-11 PROCEDURE — 99215 OFFICE O/P EST HI 40 MIN: CPT | Performed by: FAMILY MEDICINE

## 2022-08-11 RX ORDER — DOXYCYCLINE HYCLATE 100 MG
100 TABLET ORAL 2 TIMES DAILY
Qty: 60 TABLET | Refills: 1 | Status: SHIPPED | OUTPATIENT
Start: 2022-08-11 | End: 2022-10-03

## 2022-08-11 RX ORDER — DOXYCYCLINE HYCLATE 100 MG
100 TABLET ORAL 2 TIMES DAILY
Qty: 60 TABLET | Refills: 1 | Status: SHIPPED | OUTPATIENT
Start: 2022-08-11 | End: 2022-08-11 | Stop reason: SDUPTHER

## 2022-08-11 RX ORDER — CLINDAMYCIN PHOSPHATE 10 MG/G
GEL TOPICAL 2 TIMES DAILY
Qty: 60 G | Refills: 1 | Status: SHIPPED | OUTPATIENT
Start: 2022-08-11 | End: 2022-10-19 | Stop reason: SDUPTHER

## 2022-08-11 RX ORDER — CLINDAMYCIN PHOSPHATE 10 MG/G
GEL TOPICAL 2 TIMES DAILY
Qty: 60 G | Refills: 1 | Status: SHIPPED | OUTPATIENT
Start: 2022-08-11 | End: 2022-08-11 | Stop reason: SDUPTHER

## 2022-08-11 NOTE — PROGRESS NOTES
Assessment/Plan:  Concern for multiple cystic acne on his back and rosacea acne on his nose with disfigurement noted will need baseline blood work to check for vitamins level  Specially with thyroid condition put him at risk for vitamin deficiency  will start him on doxycycline twice a day to help with decreased inflammation clindamycin topical to help with antibiotic component  cerave face wash sample given to him along with moisturizer at night and sunscreen with moisturizer in the morning to use   stepwise regimen right now for patient w clear detail  Will see him back in 4 weeks follow-up his skin  Advised to stop voltaren gel on his nose, it will exascerbate his skin condition    I have spent 40 minutes with Patient  today in which greater than 50% of this time was spent in counseling/coordination of care regarding Diagnostic results, Prognosis, Risks and benefits of tx options and Intructions for management           Problem List Items Addressed This Visit        Endocrine    Hypothyroidism       Musculoskeletal and Integument    Rosacea - Primary    Relevant Medications    clindamycin (CLINDAGEL) 1 % gel    doxycycline hyclate (VIBRA-TABS) 100 mg tablet    Other Relevant Orders    Niacin (vitamin B3)    Acne vulgaris    Relevant Medications    clindamycin (CLINDAGEL) 1 % gel    doxycycline hyclate (VIBRA-TABS) 100 mg tablet      Other Visit Diagnoses     Cyanocobalamin deficiency        Relevant Orders    Vitamin B12    Vitamin D deficiency        Relevant Orders    Vitamin D 25 hydroxy    Other fatigue        Relevant Orders    CBC and differential    Comprehensive metabolic panel    Iron Panel (Includes Ferritin, Iron Sat%, Iron, and TIBC)    Testosterone, free, total    Cortisol Level, AM Specimen    Sedimentation rate, automated    C-reactive protein    Vitamin A deficiency        Relevant Orders    Vitamin A    Thiamine deficiency        Relevant Orders    Vitamin B1 (Thiamine), Serum/Plasma, LC/MS/MS    Pyridoxine deficiency        Relevant Orders    Vitamin B6    Ascorbic acid deficiency        Relevant Orders    Vitamin C    Vitamin E deficiency        Relevant Orders    Vitamin E    Zinc deficiency        Relevant Orders    Zinc    Chronic kidney disease, unspecified CKD stage         Relevant Orders    Iron Panel (Includes Ferritin, Iron Sat%, Iron, and TIBC)            Subjective:      Patient ID: Amalia Mcdonnell is a 78 y o  male  80-year-old male presenting complain skin  He has been diagnosed with rosacea is in  when he was under a very stressful situation his wife   Since then he has been suffer with acne on his body rosacea acne on his nose comes and goes and is very disfiguring and affect his self-esteem  At that time he would have bleeding pus draining from his nose  He used to get tanning in the summer to help with the redness of his nose cannot camouflage with his skin  Recently he went to visit his brother at the rehab center and a staff member refused at that him see his brother because of his red nose they thought he has been drinking alcohol which he has not been doing the past 20 years  He has metronidazole gel on his nose he use he was prescribed epidural and also he has been using Voltaren gel for his nose  He has an appoint with dermatologist in November his step daughter is getting  in September he would like to get his skin looks better for picture  He uses ivory soap to wash he does not use shampoo or conditioner for his hair  His skin very oily  No history of skin cancer  He has hypothyroidism was diagnosed 6 years ago he is on levothyroxine 25 mg daily  He used to work in [place] out to [employer]  He is a retired government worker  He does not drink alcohol does not smoke  He does not use moisturizer for his face does not use sunscreen every day        The following portions of the patient's history were reviewed and updated as appropriate:   Past Medical History:  He has a past medical history of Anxiety, BPH with obstruction/lower urinary tract symptoms, BPH with obstruction/lower urinary tract symptoms, Colon polyp, Disease of thyroid gland, Hypogonadism in male, Lightheadedness, Nocturia, Nocturia, Partial small bowel obstruction ([address] ), Shoulder injury, left, initial encounter, Sixth cranial nerve palsy, Sleep apnea, and Testicular pain, left (07/07/2020)  ,  _______________________________________________________________________  Medical Problems:  does not have any pertinent problems on file ,  _______________________________________________________________________  Past Surgical History:   has a past surgical history that includes Tonsillectomy; Nasal sinus surgery (Bilateral); and Colonoscopy  ,  _______________________________________________________________________  Family History:  family history includes Cancer in his other; Diabetes in his family; Heart disease in his brother; Other in his other ,  _______________________________________________________________________  Social History:   reports that he quit smoking about 34 years ago  His smoking use included cigarettes  He has quit using smokeless tobacco   His smokeless tobacco use included chew  He reports current alcohol use of about 1 0 standard drink of alcohol per week  He reports that he does not use drugs  ,  _______________________________________________________________________  Allergies:  is allergic to codeine, pine, and pollen extract     _______________________________________________________________________  Current Outpatient Medications   Medication Sig Dispense Refill    clindamycin (CLINDAGEL) 1 % gel Apply topically 2 (two) times a day Apply to nose/back 60 g 1    doxycycline hyclate (VIBRA-TABS) 100 mg tablet Take 1 tablet (100 mg total) by mouth 2 (two) times a day Take with food 60 tablet 1    levothyroxine 25 mcg tablet Take 1 tablet (25 mcg total) by mouth daily 90 tablet 2     No current facility-administered medications for this visit      _______________________________________________________________________  Review of Systems   Constitutional: Negative for activity change, appetite change, fatigue, fever and unexpected weight change  HENT: Negative for dental problem and trouble swallowing  Eyes: Negative for photophobia and visual disturbance  Respiratory: Negative for cough and chest tightness  Cardiovascular: Negative for chest pain, palpitations and leg swelling  Gastrointestinal: Negative for abdominal pain, constipation and vomiting  Endocrine: Negative for cold intolerance, polydipsia and polyuria  Genitourinary: Negative for difficulty urinating, frequency and urgency  Musculoskeletal: Negative for arthralgias, joint swelling, myalgias and neck pain  Skin: Positive for color change  Negative for rash and wound  Allergic/Immunologic: Negative for environmental allergies  Neurological: Negative for dizziness, weakness and numbness  Hematological: Does not bruise/bleed easily  Psychiatric/Behavioral: Negative for decreased concentration, dysphoric mood, self-injury, sleep disturbance and suicidal ideas  Objective:  Vitals:    08/11/22 1050   BP: 104/76   BP Location: Left arm   Patient Position: Sitting   Cuff Size: Large   Pulse: 76   Temp: (!) 97 2 °F (36 2 °C)   TempSrc: Tympanic   SpO2: 97%   Weight: 99 3 kg (219 lb)   Height: 6' 1" (1 854 m)     Body mass index is 28 89 kg/m²  Physical Exam  Vitals and nursing note reviewed  Constitutional:       Appearance: Normal appearance  He is well-developed  HENT:      Head: Normocephalic and atraumatic  Eyes:      Pupils: Pupils are equal, round, and reactive to light  Cardiovascular:      Rate and Rhythm: Normal rate and regular rhythm  Heart sounds: Normal heart sounds  Pulmonary:      Effort: Pulmonary effort is normal       Breath sounds: Normal breath sounds  Abdominal:      General: Bowel sounds are normal       Palpations: Abdomen is soft  Musculoskeletal:         General: Normal range of motion  Cervical back: Normal range of motion and neck supple  Skin:     General: Skin is warm and dry  Findings: Lesion present  Comments: Multiple cystic acne on his back with pitted scars lesion from previous acne on his face his neck  His nose hyphema noted telengectasia  blood vessel in the nose with cystic acne noted  Neurological:      General: No focal deficit present  Mental Status: He is alert and oriented to person, place, and time  Mental status is at baseline  Psychiatric:         Mood and Affect: Mood normal          Behavior: Behavior normal          Thought Content:  Thought content normal          Judgment: Judgment normal

## 2022-08-11 NOTE — TELEPHONE ENCOUNTER
Patient called he was seen today the medication should of been called to [facility] [address]

## 2022-08-25 ENCOUNTER — APPOINTMENT (OUTPATIENT)
Dept: LAB | Facility: CLINIC | Age: 67
End: 2022-08-25
Payer: MEDICARE

## 2022-08-25 DIAGNOSIS — E53.8 CYANOCOBALAMIN DEFICIENCY: Primary | ICD-10-CM

## 2022-08-25 DIAGNOSIS — E53.1 PYRIDOXINE DEFICIENCY: ICD-10-CM

## 2022-08-25 DIAGNOSIS — N18.9 CHRONIC KIDNEY DISEASE, UNSPECIFIED CKD STAGE: ICD-10-CM

## 2022-08-25 DIAGNOSIS — Z11.4 SCREENING FOR HIV (HUMAN IMMUNODEFICIENCY VIRUS): ICD-10-CM

## 2022-08-25 DIAGNOSIS — E51.9 THIAMINE DEFICIENCY: ICD-10-CM

## 2022-08-25 DIAGNOSIS — E55.9 VITAMIN D DEFICIENCY: ICD-10-CM

## 2022-08-25 DIAGNOSIS — R53.83 OTHER FATIGUE: ICD-10-CM

## 2022-08-25 DIAGNOSIS — L71.9 ROSACEA: ICD-10-CM

## 2022-08-25 DIAGNOSIS — E60 ZINC DEFICIENCY: ICD-10-CM

## 2022-08-25 DIAGNOSIS — E54 ASCORBIC ACID DEFICIENCY: ICD-10-CM

## 2022-08-25 DIAGNOSIS — E03.9 HYPOTHYROIDISM, UNSPECIFIED TYPE: ICD-10-CM

## 2022-08-25 DIAGNOSIS — Z12.5 SCREENING PSA (PROSTATE SPECIFIC ANTIGEN): ICD-10-CM

## 2022-08-25 DIAGNOSIS — E50.9 VITAMIN A DEFICIENCY: ICD-10-CM

## 2022-08-25 DIAGNOSIS — E78.2 MIXED HYPERLIPIDEMIA: ICD-10-CM

## 2022-08-25 DIAGNOSIS — E53.8 CYANOCOBALAMIN DEFICIENCY: ICD-10-CM

## 2022-08-25 DIAGNOSIS — E56.0 VITAMIN E DEFICIENCY: ICD-10-CM

## 2022-08-25 LAB
25(OH)D3 SERPL-MCNC: 27.4 NG/ML (ref 30–100)
ALBUMIN SERPL BCP-MCNC: 4.1 G/DL (ref 3.5–5)
ALP SERPL-CCNC: 66 U/L (ref 34–104)
ALT SERPL W P-5'-P-CCNC: 9 U/L (ref 7–52)
ANION GAP SERPL CALCULATED.3IONS-SCNC: 6 MMOL/L (ref 4–13)
AST SERPL W P-5'-P-CCNC: 16 U/L (ref 13–39)
BASOPHILS # BLD AUTO: 0.03 THOUSANDS/ΜL (ref 0–0.1)
BASOPHILS NFR BLD AUTO: 1 % (ref 0–1)
BILIRUB SERPL-MCNC: 0.58 MG/DL (ref 0.2–1)
BUN SERPL-MCNC: 12 MG/DL (ref 5–25)
CALCIUM SERPL-MCNC: 9.4 MG/DL (ref 8.4–10.2)
CHLORIDE SERPL-SCNC: 105 MMOL/L (ref 96–108)
CHOLEST SERPL-MCNC: 193 MG/DL
CO2 SERPL-SCNC: 29 MMOL/L (ref 21–32)
CORTIS AM PEAK SERPL-MCNC: 17.6 UG/DL (ref 4.2–22.4)
CREAT SERPL-MCNC: 1.02 MG/DL (ref 0.6–1.3)
CRP SERPL QL: 2.8 MG/L
EOSINOPHIL # BLD AUTO: 0.14 THOUSAND/ΜL (ref 0–0.61)
EOSINOPHIL NFR BLD AUTO: 2 % (ref 0–6)
ERYTHROCYTE [DISTWIDTH] IN BLOOD BY AUTOMATED COUNT: 12.7 % (ref 11.6–15.1)
ERYTHROCYTE [SEDIMENTATION RATE] IN BLOOD: 6 MM/HOUR (ref 0–19)
FERRITIN SERPL-MCNC: 242 NG/ML (ref 8–388)
GFR SERPL CREATININE-BSD FRML MDRD: 76 ML/MIN/1.73SQ M
GLUCOSE P FAST SERPL-MCNC: 85 MG/DL (ref 65–99)
HCT VFR BLD AUTO: 44.9 % (ref 36.5–49.3)
HDLC SERPL-MCNC: 47 MG/DL
HGB BLD-MCNC: 14.8 G/DL (ref 12–17)
IMM GRANULOCYTES # BLD AUTO: 0.02 THOUSAND/UL (ref 0–0.2)
IMM GRANULOCYTES NFR BLD AUTO: 0 % (ref 0–2)
IRON SATN MFR SERPL: 38 % (ref 20–50)
IRON SERPL-MCNC: 116 UG/DL (ref 65–175)
LDLC SERPL CALC-MCNC: 106 MG/DL (ref 0–100)
LYMPHOCYTES # BLD AUTO: 2.5 THOUSANDS/ΜL (ref 0.6–4.47)
LYMPHOCYTES NFR BLD AUTO: 40 % (ref 14–44)
MCH RBC QN AUTO: 29.1 PG (ref 26.8–34.3)
MCHC RBC AUTO-ENTMCNC: 33 G/DL (ref 31.4–37.4)
MCV RBC AUTO: 88 FL (ref 82–98)
MONOCYTES # BLD AUTO: 0.73 THOUSAND/ΜL (ref 0.17–1.22)
MONOCYTES NFR BLD AUTO: 12 % (ref 4–12)
NEUTROPHILS # BLD AUTO: 2.78 THOUSANDS/ΜL (ref 1.85–7.62)
NEUTS SEG NFR BLD AUTO: 45 % (ref 43–75)
NRBC BLD AUTO-RTO: 0 /100 WBCS
PLATELET # BLD AUTO: 282 THOUSANDS/UL (ref 149–390)
PMV BLD AUTO: 9.3 FL (ref 8.9–12.7)
POTASSIUM SERPL-SCNC: 4.2 MMOL/L (ref 3.5–5.3)
PROT SERPL-MCNC: 6.7 G/DL (ref 6.4–8.4)
PSA SERPL-MCNC: 1.5 NG/ML (ref 0–4)
RBC # BLD AUTO: 5.09 MILLION/UL (ref 3.88–5.62)
SODIUM SERPL-SCNC: 140 MMOL/L (ref 135–147)
TIBC SERPL-MCNC: 302 UG/DL (ref 250–450)
TRIGL SERPL-MCNC: 199 MG/DL
TSH SERPL DL<=0.05 MIU/L-ACNC: 3.28 UIU/ML (ref 0.45–4.5)
VIT B12 SERPL-MCNC: 189 PG/ML (ref 100–900)
WBC # BLD AUTO: 6.2 THOUSAND/UL (ref 4.31–10.16)

## 2022-08-25 PROCEDURE — 84403 ASSAY OF TOTAL TESTOSTERONE: CPT

## 2022-08-25 PROCEDURE — 83540 ASSAY OF IRON: CPT

## 2022-08-25 PROCEDURE — 86140 C-REACTIVE PROTEIN: CPT

## 2022-08-25 PROCEDURE — 84630 ASSAY OF ZINC: CPT

## 2022-08-25 PROCEDURE — 85652 RBC SED RATE AUTOMATED: CPT

## 2022-08-25 PROCEDURE — G0103 PSA SCREENING: HCPCS

## 2022-08-25 PROCEDURE — 84207 ASSAY OF VITAMIN B-6: CPT

## 2022-08-25 PROCEDURE — 84443 ASSAY THYROID STIM HORMONE: CPT

## 2022-08-25 PROCEDURE — 82306 VITAMIN D 25 HYDROXY: CPT

## 2022-08-25 PROCEDURE — 80061 LIPID PANEL: CPT

## 2022-08-25 PROCEDURE — 84590 ASSAY OF VITAMIN A: CPT

## 2022-08-25 PROCEDURE — 83550 IRON BINDING TEST: CPT

## 2022-08-25 PROCEDURE — 82180 ASSAY OF ASCORBIC ACID: CPT

## 2022-08-25 PROCEDURE — 87389 HIV-1 AG W/HIV-1&-2 AB AG IA: CPT

## 2022-08-25 PROCEDURE — 82607 VITAMIN B-12: CPT

## 2022-08-25 PROCEDURE — 84446 ASSAY OF VITAMIN E: CPT

## 2022-08-25 PROCEDURE — 84402 ASSAY OF FREE TESTOSTERONE: CPT

## 2022-08-25 PROCEDURE — 84425 ASSAY OF VITAMIN B-1: CPT

## 2022-08-25 PROCEDURE — 80053 COMPREHEN METABOLIC PANEL: CPT

## 2022-08-25 PROCEDURE — 82533 TOTAL CORTISOL: CPT

## 2022-08-25 PROCEDURE — 82728 ASSAY OF FERRITIN: CPT

## 2022-08-25 PROCEDURE — 84591 ASSAY OF NOS VITAMIN: CPT

## 2022-08-25 PROCEDURE — 85025 COMPLETE CBC W/AUTO DIFF WBC: CPT

## 2022-08-25 PROCEDURE — 36415 COLL VENOUS BLD VENIPUNCTURE: CPT

## 2022-08-26 LAB — HIV 1+2 AB+HIV1 P24 AG SERPL QL IA: NORMAL

## 2022-08-27 LAB
TESTOST FREE SERPL-MCNC: 11.6 PG/ML (ref 6.6–18.1)
TESTOST SERPL-MCNC: 281 NG/DL (ref 264–916)

## 2022-08-28 LAB — ZINC SERPL-MCNC: 112 UG/DL (ref 44–115)

## 2022-08-29 LAB
VIT B1 BLD-SCNC: 133.2 NMOL/L (ref 66.5–200)
VIT B6 SERPL-MCNC: 3.7 UG/L (ref 3.4–65.2)

## 2022-08-30 LAB — VIT C SERPL-MCNC: 0.6 MG/DL (ref 0.4–2)

## 2022-09-02 LAB
A-TOCOPHEROL VIT E SERPL-MCNC: 12.1 MG/L (ref 9–29)
GAMMA TOCOPHEROL SERPL-MCNC: 1.8 MG/L (ref 0.5–4.9)
NIACIN SERPL-MCNC: <5 NG/ML (ref 0–5)
NICOTINAMIDE SERPL-MCNC: 18.8 NG/ML (ref 5.2–72.1)
VIT A SERPL-MCNC: 54.6 UG/DL (ref 22–69.5)

## 2022-09-16 ENCOUNTER — APPOINTMENT (OUTPATIENT)
Dept: LAB | Facility: CLINIC | Age: 67
End: 2022-09-16
Payer: MEDICARE

## 2022-09-16 DIAGNOSIS — E53.8 CYANOCOBALAMIN DEFICIENCY: ICD-10-CM

## 2022-09-16 PROCEDURE — 86340 INTRINSIC FACTOR ANTIBODY: CPT

## 2022-09-16 PROCEDURE — 36415 COLL VENOUS BLD VENIPUNCTURE: CPT

## 2022-09-16 PROCEDURE — 83516 IMMUNOASSAY NONANTIBODY: CPT

## 2022-09-17 LAB — PCA AB SER-ACNC: 1.5 UNITS (ref 0–20)

## 2022-09-18 LAB — IF BLOCK AB SER QL RIA: 1 AU/ML (ref 0–1.1)

## 2022-10-03 DIAGNOSIS — L70.0 ACNE VULGARIS: ICD-10-CM

## 2022-10-03 DIAGNOSIS — L71.9 ROSACEA: ICD-10-CM

## 2022-10-03 RX ORDER — DOXYCYCLINE HYCLATE 100 MG
TABLET ORAL
Qty: 60 TABLET | Refills: 1 | Status: SHIPPED | OUTPATIENT
Start: 2022-10-03 | End: 2022-10-19 | Stop reason: SDUPTHER

## 2022-10-12 ENCOUNTER — RA CDI HCC (OUTPATIENT)
Dept: OTHER | Facility: HOSPITAL | Age: 67
End: 2022-10-12

## 2022-10-12 NOTE — PROGRESS NOTES
[address]  coding opportunities       Chart reviewed, no opportunity found: CHART REVIEWED, NO OPPORTUNITY FOUND        Patients Insurance     Medicare Insurance: Medicare

## 2022-10-19 ENCOUNTER — OFFICE VISIT (OUTPATIENT)
Dept: FAMILY MEDICINE CLINIC | Facility: CLINIC | Age: 67
End: 2022-10-19
Payer: MEDICARE

## 2022-10-19 VITALS
HEART RATE: 74 BPM | SYSTOLIC BLOOD PRESSURE: 120 MMHG | WEIGHT: 228.2 LBS | TEMPERATURE: 98.6 F | RESPIRATION RATE: 16 BRPM | HEIGHT: 73 IN | BODY MASS INDEX: 30.24 KG/M2 | DIASTOLIC BLOOD PRESSURE: 70 MMHG | OXYGEN SATURATION: 98 %

## 2022-10-19 DIAGNOSIS — E53.1 PYRIDOXINE DEFICIENCY: ICD-10-CM

## 2022-10-19 DIAGNOSIS — E53.8 CYANOCOBALAMIN DEFICIENCY: ICD-10-CM

## 2022-10-19 DIAGNOSIS — Z87.891 HISTORY OF TOBACCO USE: ICD-10-CM

## 2022-10-19 DIAGNOSIS — N40.0 ENLARGED PROSTATE WITHOUT LOWER URINARY TRACT SYMPTOMS (LUTS): ICD-10-CM

## 2022-10-19 DIAGNOSIS — E55.9 VITAMIN D DEFICIENCY: ICD-10-CM

## 2022-10-19 DIAGNOSIS — L71.9 ROSACEA: ICD-10-CM

## 2022-10-19 DIAGNOSIS — Z87.898 HISTORY OF ALCOHOL USE: ICD-10-CM

## 2022-10-19 DIAGNOSIS — E78.2 MIXED HYPERLIPIDEMIA: ICD-10-CM

## 2022-10-19 DIAGNOSIS — E78.1 HYPERTRIGLYCERIDEMIA: ICD-10-CM

## 2022-10-19 DIAGNOSIS — E03.9 HYPOTHYROIDISM, UNSPECIFIED TYPE: ICD-10-CM

## 2022-10-19 DIAGNOSIS — Z23 ENCOUNTER FOR IMMUNIZATION: Primary | ICD-10-CM

## 2022-10-19 DIAGNOSIS — E54 ASCORBIC ACID DEFICIENCY: ICD-10-CM

## 2022-10-19 DIAGNOSIS — L70.0 ACNE VULGARIS: ICD-10-CM

## 2022-10-19 PROCEDURE — G0008 ADMIN INFLUENZA VIRUS VAC: HCPCS

## 2022-10-19 PROCEDURE — G0009 ADMIN PNEUMOCOCCAL VACCINE: HCPCS

## 2022-10-19 PROCEDURE — G0439 PPPS, SUBSEQ VISIT: HCPCS

## 2022-10-19 PROCEDURE — 99214 OFFICE O/P EST MOD 30 MIN: CPT

## 2022-10-19 PROCEDURE — 90677 PCV20 VACCINE IM: CPT

## 2022-10-19 PROCEDURE — 90662 IIV NO PRSV INCREASED AG IM: CPT

## 2022-10-19 PROCEDURE — 96372 THER/PROPH/DIAG INJ SC/IM: CPT

## 2022-10-19 RX ORDER — DOXYCYCLINE HYCLATE 100 MG
100 TABLET ORAL 2 TIMES DAILY
Qty: 180 TABLET | Refills: 1 | Status: SHIPPED | OUTPATIENT
Start: 2022-10-19 | End: 2023-01-17

## 2022-10-19 RX ORDER — AZELAIC ACID 0.15 G/G
GEL TOPICAL
Qty: 50 G | Refills: 2 | Status: SHIPPED | OUTPATIENT
Start: 2022-10-19

## 2022-10-19 RX ORDER — CLINDAMYCIN PHOSPHATE 10 MG/G
GEL TOPICAL 2 TIMES DAILY
Qty: 60 G | Refills: 2 | Status: SHIPPED | OUTPATIENT
Start: 2022-10-19

## 2022-10-19 RX ORDER — CYANOCOBALAMIN 1000 UG/ML
1000 INJECTION, SOLUTION INTRAMUSCULAR; SUBCUTANEOUS
Status: SHIPPED | OUTPATIENT
Start: 2022-10-19

## 2022-10-19 RX ADMIN — CYANOCOBALAMIN 1000 MCG: 1000 INJECTION, SOLUTION INTRAMUSCULAR; SUBCUTANEOUS at 10:57

## 2022-10-19 NOTE — PROGRESS NOTES
Name: Cheryl Phillips      : 1955      MRN: [id number]  Encounter Provider: Delores Kaye MD  Encounter Date: 10/19/2022   Encounter department: [facility]    Assessment & Plan       Discussed with patient results in detail  He test negative for pernicious anemia however his B12 level is very low in the 100  B6 vitamin-C vitamin-D also very low  He used to drink alcohol heavily when his 1st wife   He stop drinking that couple years now  Triglycerides high 199 LDL is 106  This point advised to get replacement vitamin B6 B12 vitamin-C vitamin-D  Need to get B12 injection weekly for 1 month and every 2 weeks for 1 month then monthly also need to start B12 oral supplementation  Will check for celiac disease  H pylori was tested by EGD was negative  Advised Omega 3 to lower his triglyceride  Flu and pneumonia 20 given to patient today  Thyroid level is normal PSA testosterone all normal   Advised follow-up with his PCP for medical condition otherwise see him back in 3 months for skin checked this point advised for doxycycline twice a day for 2 weeks the other 2 weeks in the month and do daily  Continue clindamycin  Add on finacea to help with redness in his nose and salicylic acid wipes to open up the pores for follicle  I have spent 40 minutes with Patient  today in which greater than 50% of this time was spent in counseling/coordination of care regarding Diagnostic results, Prognosis, Risks and benefits of tx options and Intructions for management  1  Encounter for immunization  -     influenza vaccine, high-dose, PF 0 7 mL (FLUZONE HIGH-DOSE)  -     Pneumococcal Conjugate Vaccine 20-valent (Pcv20)    2  Hypothyroidism, unspecified type  -     TSH, 3rd generation with Free T4 reflex; Future    3  Rosacea  -     doxycycline hyclate (VIBRA-TABS) 100 mg tablet; Take 1 tablet (100 mg total) by mouth 2 (two) times a day  -     clindamycin (CLINDAGEL) 1 % gel;  Apply topically 2 (two) times a day Apply to nose/back  -     Azelaic Acid (Finacea) 15 % cream; Use on face twice daily    4  Acne vulgaris  -     doxycycline hyclate (VIBRA-TABS) 100 mg tablet; Take 1 tablet (100 mg total) by mouth 2 (two) times a day  -     clindamycin (CLINDAGEL) 1 % gel; Apply topically 2 (two) times a day Apply to nose/back  -     Azelaic Acid (Finacea) 15 % cream; Use on face twice daily    5  Enlarged prostate without lower urinary tract symptoms (luts)    6  Mixed hyperlipidemia  -     CBC and differential; Future  -     Comprehensive metabolic panel; Future    7  Cyanocobalamin deficiency  -     Vitamin B12; Future  -     Celiac Disease Panel; Future  -     cyanocobalamin injection 1,000 mcg    8  Vitamin D deficiency  -     Vitamin D 25 hydroxy; Future  -     Celiac Disease Panel; Future    9  Ascorbic acid deficiency  -     Vitamin C; Future  -     Celiac Disease Panel; Future    10  Pyridoxine deficiency  -     Vitamin B6; Future  -     Celiac Disease Panel; Future    11  Hypertriglyceridemia    12  History of alcohol use    13  History of tobacco use      Depression Screening and Follow-up Plan: Patient was screened for depression during today's encounter  They screened negative with a PHQ-2 score of 0  Subjective      71-year-old male follow-up rosacea acne  He is on doxycycline twice a day finished 6 weeks now he is on daily  Since then he noticed his skin breaking out with more however there is tremendous improvement since he is using clindamycin topical his nose last rest less breaking out and less bleeding  He also had blood work done to check for thyroid and vitamin deficiency  He had his EGD colonoscopy done 6 months ago is on normal   He follow-up his PCP for thyroid cholesterol mood and insomnia  Review of Systems   Constitutional: Negative for activity change, appetite change, fatigue, fever and unexpected weight change     HENT: Negative for dental problem and trouble swallowing  Eyes: Negative for photophobia and visual disturbance  Respiratory: Negative for cough and chest tightness  Cardiovascular: Negative for chest pain, palpitations and leg swelling  Gastrointestinal: Negative for abdominal pain, constipation and vomiting  Endocrine: Negative for cold intolerance, polydipsia and polyuria  Genitourinary: Negative for difficulty urinating, frequency and urgency  Musculoskeletal: Negative for arthralgias, joint swelling, myalgias and neck pain  Skin: Negative for color change, rash and wound  Allergic/Immunologic: Negative for environmental allergies  Neurological: Negative for dizziness, weakness and numbness  Hematological: Does not bruise/bleed easily  Psychiatric/Behavioral: Negative for decreased concentration, dysphoric mood, self-injury, sleep disturbance and suicidal ideas  Current Outpatient Medications on File Prior to Visit   Medication Sig   • ALPRAZolam (XANAX) 1 mg tablet Take 1 tablet (1 mg total) by mouth daily as needed for anxiety   • levothyroxine 25 mcg tablet Take 1 tablet (25 mcg total) by mouth daily   • zolpidem (AMBIEN) 10 mg tablet Take 1 tablet (10 mg total) by mouth daily at bedtime as needed for sleep  • [DISCONTINUED] clindamycin (CLINDAGEL) 1 % gel Apply topically 2 (two) times a day Apply to nose/back   • [DISCONTINUED] doxycycline hyclate (VIBRA-TABS) 100 mg tablet TAKE ONE TABLET BY MOUTH TWICE DAILY, take with food (Patient not taking: Reported on 10/19/2022)       Objective     /70 (BP Location: Left arm, Patient Position: Sitting, Cuff Size: Large)   Pulse 74   Temp 98 6 °F (37 °C) (Temporal)   Resp 16   Ht 6' 1" (1 854 m)   Wt 104 kg (228 lb 3 2 oz)   SpO2 98%   BMI 30 11 kg/m²     Physical Exam  Vitals and nursing note reviewed  Constitutional:       Appearance: Normal appearance  He is well-developed  HENT:      Head: Normocephalic and atraumatic        Right Ear: Tympanic membrane, ear canal and external ear normal       Left Ear: Tympanic membrane, ear canal and external ear normal       Nose: Nose normal       Mouth/Throat:      Mouth: Mucous membranes are moist       Pharynx: Oropharynx is clear  Eyes:      Extraocular Movements: Extraocular movements intact  Pupils: Pupils are equal, round, and reactive to light  Cardiovascular:      Rate and Rhythm: Normal rate and regular rhythm  Pulses: Normal pulses  Heart sounds: Normal heart sounds  Pulmonary:      Effort: Pulmonary effort is normal       Breath sounds: Normal breath sounds  Abdominal:      General: Bowel sounds are normal       Palpations: Abdomen is soft  Musculoskeletal:         General: Normal range of motion  Cervical back: Normal range of motion and neck supple  Skin:     General: Skin is warm and dry  Capillary Refill: Capillary refill takes less than 2 seconds  Neurological:      General: No focal deficit present  Mental Status: He is alert and oriented to person, place, and time  Mental status is at baseline  Psychiatric:         Mood and Affect: Mood normal          Behavior: Behavior normal          Thought Content:  Thought content normal          Judgment: Judgment normal        Leyla Diop MD

## 2022-10-19 NOTE — PROGRESS NOTES
Sarah Aldana is here for his Subsequent Wellness visit  Health Risk Assessment:   Patient rates overall health as very good  Patient feels that their physical health rating is same  Patient is satisfied with their life  Eyesight was rated as slightly worse  Hearing was rated as same  Patient feels that their emotional and mental health rating is same  Patients states they are sometimes angry  Patient states they are never, rarely unusually tired/fatigued  Pain experienced in the last 7 days has been none  Patient states that he has experienced no weight loss or gain in last 6 months  Depression Screening:   PHQ-2 Score: 0      Fall Risk Screening: In the past year, patient has experienced: no history of falling in past year      Home Safety:  Patient does not have trouble with stairs inside or outside of their home  Patient has working smoke alarms and has working carbon monoxide detector  Home safety hazards include: none  Nutrition:   Current diet is Regular and Limited junk food  Medications:   Patient is currently taking over-the-counter supplements  OTC medications include: see medication list  Patient is able to manage medications  Activities of Daily Living (ADLs)/Instrumental Activities of Daily Living (IADLs):   Walk and transfer into and out of bed and chair?: Yes  Dress and groom yourself?: Yes    Bathe or shower yourself?: Yes    Feed yourself?  Yes  Do your laundry/housekeeping?: Yes  Manage your money, pay your bills and track your expenses?: Yes  Make your own meals?: Yes    Do your own shopping?: Yes    Previous Hospitalizations:   Any hospitalizations or ED visits within the last 12 months?: No      Advance Care Planning:   Living will: No    Durable POA for healthcare: No      Cognitive Screening:   Provider or family/friend/caregiver concerned regarding cognition?: No    PREVENTIVE SCREENINGS      Cardiovascular Screening:    General: History Lipid Disorder, Risks and Benefits Discussed and Screening Current      Diabetes Screening:     General: Screening Current and Risks and Benefits Discussed      Colorectal Cancer Screening:     General: Screening Current      Prostate Cancer Screening:    General: Screening Current and Risks and Benefits Discussed      Osteoporosis Screening:    General: Risks and Benefits Discussed      Abdominal Aortic Aneurysm (AAA) Screening:    Risk factors include: age between 73-67 yo and tobacco use        General: Risks and Benefits Discussed and Screening Current      Lung Cancer Screening:     General: Screening Not Indicated and Risks and Benefits Discussed      Hepatitis C Screening:    General: Screening Current and Risks and Benefits Discussed    Screening, Brief Intervention, and Referral to Treatment (SBIRT)    Screening  Typical number of drinks in a day: 0  Typical number of drinks in a week: 0  Interpretation: Low risk drinking behavior  AUDIT-C Screenin) How often did you have a drink containing alcohol in the past year? never  2) How many drinks did you have on a typical day when you were drinking in the past year? 0  3) How often did you have 6 or more drinks on one occasion in the past year? never    AUDIT-C Score: 0  Interpretation: Score 0-3 (male): Negative screen for alcohol misuse    Single Item Drug Screening:  How often have you used an illegal drug (including marijuana) or a prescription medication for non-medical reasons in the past year? never    Single Item Drug Screen Score: 0  Interpretation: Negative screen for possible drug use disorder    Brief Intervention  Alcohol & drug use screenings were reviewed  No concerns regarding substance use disorder identified  Healthy alcohol use/limits discussed  Other Counseling Topics:   Car/seat belt/driving safety, skin self-exam, sunscreen and calcium and vitamin D intake and regular weightbearing exercise

## 2022-10-20 ENCOUNTER — OFFICE VISIT (OUTPATIENT)
Dept: DERMATOLOGY | Facility: CLINIC | Age: 67
End: 2022-10-20
Payer: MEDICARE

## 2022-10-20 VITALS — WEIGHT: 228 LBS | HEIGHT: 73 IN | BODY MASS INDEX: 30.22 KG/M2

## 2022-10-20 DIAGNOSIS — L71.9 ROSACEA: Primary | ICD-10-CM

## 2022-10-20 PROCEDURE — 99203 OFFICE O/P NEW LOW 30 MIN: CPT | Performed by: DERMATOLOGY

## 2022-10-20 NOTE — PROGRESS NOTES
[facility] Note     Patient Name: Delonte Bruner  Encounter Date: 10/20/22    • Have you been cared for by a [place] [address] Dermatologist in the last 3 years and, if so, which one? No    · Have you traveled outside of the [facility] in the past 3 months? No    • May we call your Preferred Phone number to discuss your specific medical information? Yes    • May we leave a detailed message that includes your specific medical information? Yes      Today's Chief Concerns:  • Concern #1:  rosacea      Past Medical History:  Have you personally ever had or currently have any of the following? · Skin cancer (such as Melanoma, Basal Cell Carcinoma, Squamous Cell Carcinoma? (If Yes, please provide more detail)- No  · Eczema: No  · Psoriasis: No  · HIV/AIDS: No  · Hepatitis B or C: No  · Tuberculosis: No  · Systemic Immunosuppression such as Diabetes, Biologic or Immunotherapy, Chemotherapy, Organ Transplantation, Bone Marrow Transplantation (If YES, please provide more detail): No  · Radiation Treatment (If YES, please provide more detail): No  · Any other major medical conditions/concerns? (If Yes, which types)- No    Social History:    What is/was your primary occupation? What are your hobbies/past-times? Family history:    Have any of your "first degree relatives" (parent, brother, sister, or child) had any of the following       · Skin cancer such as Melanoma or Merkel Cell Carcinoma or Pancreatic Cancer? No  · Eczema, Asthma, Hay Fever or Seasonal Allergies: No  · Psoriasis or Psoriatic Arthritis: No  · Do any other medical conditions seem to run in your family? If Yes, what condition and which relatives?   No    Current Medications :    Current Outpatient Medications:   •  ALPRAZolam (XANAX) 1 mg tablet, Take 1 tablet (1 mg total) by mouth daily as needed for anxiety, Disp: 15 tablet, Rfl: 0  •  Azelaic Acid (Finacea) 15 % cream, Use on face twice daily, Disp: 50 g, Rfl: 2  •  clindamycin (CLINDAGEL) 1 % gel, Apply topically 2 (two) times a day Apply to nose/back, Disp: 60 g, Rfl: 2  •  doxycycline hyclate (VIBRA-TABS) 100 mg tablet, Take 1 tablet (100 mg total) by mouth 2 (two) times a day, Disp: 180 tablet, Rfl: 1  •  zolpidem (AMBIEN) 10 mg tablet, Take 1 tablet (10 mg total) by mouth daily at bedtime as needed for sleep , Disp: 30 tablet, Rfl: 0  •  levothyroxine 25 mcg tablet, Take 1 tablet (25 mcg total) by mouth daily, Disp: 90 tablet, Rfl: 2    Current Facility-Administered Medications:   •  cyanocobalamin injection 1,000 mcg, 1,000 mcg, Intramuscular, Q30 Days, Leyla Roberto MD, 1,000 mcg at 10/19/22 1057      Review of Systems/System Alerts:  Have you recently had or currently have any of the following? If YES, what are you doing for the problem? · Fever, chills or unintended weight loss: No  · Sudden loss or change in your vision: No  · Nausea, vomiting or blood in your stool: No  · Painful or swollen joints: No  · Wheezing or cough: No  · Changing mole or non-healing wound: No  · Nosebleeds: No  · Excessive sweating: No  · Easy or prolonged bleeding? No  · Over the last 2 weeks, how often have you been bothered by the following problems? · Taking little interest or pleasure in doing things: 1 - Not at All  · Feeling down, depressed, or hopeless: 1 - Not at All  · Rapid heartbeat with epinephrine:  No  ·   · Any known allergies? YES, see below  Allergies   Allergen Reactions   • Codeine GI Intolerance   • Pine Allergic Rhinitis   • Pollen Extract Allergic Rhinitis   ·       PHYSICAL EXAM:      • Was a chaperone (Derm Clinical Assistant) present throughout the entire Physical Exam? Yes    • Did the Dermatology Team specifically  the patient on the importance of a Full Skin Exam to be sure that nothing is missed clinically?  Yes}  o Did the patient request or accept a Full Skin Exam?  NO  o Did the patient specifically refuse to have the areas "under-the-bra" examined by the Dermatologist? No  o Did the patient specifically refuse to have the areas "under-the-underwear" examined by the Dermatologist? No      CONSTITUTIONAL:   Vitals:    10/20/22 1041   Weight: 103 kg (228 lb)   Height: 6' 1" (1 854 m)           PSYCH: Normal mood and affect  EYES: Normal conjunctiva  ENT: Normal lips and oral mucosa  CARDIOVASCULAR: No edema  RESPIRATORY: Normal respirations  HEME/LYMPH/IMMUNO:  No regional lymphadenopathy except as noted below in ASSESSMENT AND PLAN BY DIAGNOSIS    SKIN:  FULL [address] Normal except as noted below in Assessment   Neck Normal except as noted below in Assessment        ROSACEA    Physical Exam:  • Anatomic Location Affected:  face  • Morphological Description:               Additional History of Present Condition:  Patient states he has been breaking out with pimples and diagnosed with rosacea by the family doctor  Started treatment about 6 weeks ago  Is taking Doxycycline 100 my twice daily and using clindamycin 1% gel twice daily  Washing with CeraVe cleanser  Skin tends to be very oily  Assessment and Plan:  Based on a thorough discussion of this condition and the management approach to it (including a comprehensive discussion of the known risks, side effects and potential benefits of treatment), the patient (family) agrees to implement the following specific plan:    • Continue clindamycin 1% gel twice daily until patient switches to below:  • Continue doxycycline 100 mg twice daily as directed by family physician  Azelaic Acid: 15%  Ivermectin: 1%  Metronidazole: 1%  Vehicle: Cream     [employer]     We use this service to prescribe medications that are often not covered by insurance  Your physician will send your prescription to the pharmacy  You will receive an email from [email or web address] where you will follow the instructions within the email to provide your billing and shipping information  Your medicine will be shipped directly to you  If you have any questions, you can call [phone] or email [email or web address]      Rosacea is a chronic rash affecting the mid-face including the nose, cheeks, chin, forehead, and eyelids  The incidence is usually greatest between the ages of 30-60 years and is more common in people with fair skin  Common characteristics include redness, telangiectasias, papules and pustules over affected areas  Rosacea may look similar to acne, but there is a lack of comedones  Occasionally the eyes may also be involved in ocular rosacea  In advanced disease, enlargement of the sebaceous glands in the nose, termed rhinophyma, may be present  Rosacea results in red spots (papules) and sometimes pustules over the face, but unlike acne there are no blackheads, whiteheads, or cystic nodules  Patients often experience increased facial flushing with prominent blood vessels (erythematotelangiectatic rosacea) and dry, sensitive skin  These symptoms are exacerbated by sun exposure, hot or spicy foods, topical steroids and oil-based facial products  In ocular rosacea, eyelids may be red and sore due to conjunctivitis, keratitis, and episcleritis  If rhinophyma develops due to enlargement of sebaceous glands, the patient may have an enlarged and irregularly shaped nose with prominent pores  In rosacea that is refractory to treatment, patients can develop persistent redness and swelling of the face due to lymphatic obstruction (Morbihan disease)  Distribution around the cheeks may be confused with the malar or “butterfly rash” of lupus  However, the rash of lupus spares the nasal creases and lacks papules and pustules   If signs of photosensitivity, oral ulcers, arthritis, and kidney dysfunction are present then consider referral to a rheumatologist      There are many potential causes of rosacea including genetic, environmental, vascular, and inflammatory factors  These include, but are not limited to:  • Chronic exposure to ultraviolet radiation   • Increased immune responses in the form of cathelicidins that promote vessel dilation and infiltration with white blood cells (neutrophils) into the dermis  • Increased matrix metalloproteinases such as collagen and elastase that remodel normal tissue may contribute to inflammation of the skin making it thicker and harder  • There is some evidence to suggest that increased numbers of demodex mites on patient skin may contribute to rosacea papules     General Treatment Approach   • Avoid exacerbating factors such as heat, spicy foods, and alcohol   • Use daily SPF30+ sunscreen and other methods of coverage for sun protection  • Use water-based make-up   • Avoid applying topical steroids to affected areas as they can cause perioral dermatitis and exacerbate rosacea     Topical Treatment Approach  • Metronidazole cream or gel by itself or in combination with oral antibiotics for more severe cases  • Azelaic acid cream or lotion is effective for mild inflammatory rosacea when applied twice daily to affected areas  • Brimonidine gel and oxymetazoline hydrochloride cream can reduce facial redness temporarily   • Ivermectin cream can treat papulopustular rosacea by controlling demodex mites and inflammation   • Pimecrolimus cream or tacrolimus ointment twice a day for 2-3 months can help reduce inflammation    Oral Treatment Approach  • Antibiotics such as doxycycline, minocycline, or erythromycin for 1-3 months  • Clonidine and carvedilol can help reduce facial flushing and are generally well tolerated  Common side effects include low blood pressure, gastrointestinal upset, dry eyes, blurred vision and low heart rate  • Isotretinoin at low doses can be effective for long term treatment when antibiotics fail  Side effects may make it unsuitable for some patients     • NSAIDs such as diclofenac can help reduce discomfort and redness in the skin  Procedural/Surgical Treatment Approach   • Vascular lasers or intense pulsed light treatment may be used to treat persistent telangiectasia and papulopustular rosacea  • Plastic surgery and carbon dioxide lasers may be used to treat rhinophyma         Waxy tan flat epidermal papule, half scratched off, c/w SK

## 2022-10-21 ENCOUNTER — TELEPHONE (OUTPATIENT)
Dept: DERMATOLOGY | Facility: CLINIC | Age: 67
End: 2022-10-21

## 2022-10-23 DIAGNOSIS — E03.9 HYPOTHYROIDISM, UNSPECIFIED TYPE: ICD-10-CM

## 2022-10-24 RX ORDER — LEVOTHYROXINE SODIUM 0.03 MG/1
TABLET ORAL
Qty: 90 TABLET | Refills: 0 | Status: SHIPPED | OUTPATIENT
Start: 2022-10-24

## 2022-10-25 ENCOUNTER — CLINICAL SUPPORT (OUTPATIENT)
Dept: FAMILY MEDICINE CLINIC | Facility: CLINIC | Age: 67
End: 2022-10-25
Payer: MEDICARE

## 2022-10-25 DIAGNOSIS — E53.8 CYANOCOBALAMIN DEFICIENCY: Primary | ICD-10-CM

## 2022-10-25 RX ADMIN — CYANOCOBALAMIN 1000 MCG: 1000 INJECTION, SOLUTION INTRAMUSCULAR; SUBCUTANEOUS at 08:56

## 2022-10-29 ENCOUNTER — OFFICE VISIT (OUTPATIENT)
Dept: URGENT CARE | Age: 67
End: 2022-10-29

## 2022-10-29 VITALS
TEMPERATURE: 98 F | DIASTOLIC BLOOD PRESSURE: 72 MMHG | HEART RATE: 70 BPM | OXYGEN SATURATION: 98 % | SYSTOLIC BLOOD PRESSURE: 118 MMHG | RESPIRATION RATE: 18 BRPM

## 2022-10-29 DIAGNOSIS — H60.501 ACUTE OTITIS EXTERNA OF RIGHT EAR, UNSPECIFIED TYPE: Primary | ICD-10-CM

## 2022-10-29 RX ORDER — CIPROFLOXACIN AND DEXAMETHASONE 3; 1 MG/ML; MG/ML
4 SUSPENSION/ DROPS AURICULAR (OTIC) 2 TIMES DAILY
Qty: 3 ML | Refills: 0 | Status: SHIPPED | OUTPATIENT
Start: 2022-10-29 | End: 2022-11-05

## 2022-10-29 NOTE — PROGRESS NOTES
[facility]        NAME: Kecia Batres is a 78 y o  male  : 1955    MRN: [id number]  DATE: 2022  TIME: 1:51 PM    Assessment and Plan   Acute otitis externa of right ear, unspecified type [H60 501]  1  Acute otitis externa of right ear, unspecified type  ciprofloxacin-dexamethasone (CIPRODEX) otic suspension   Ciprodex otic solution ordered for right otitis externa  Patient advised to follow-up with primary care provider if symptoms do not resolve within 1-2 weeks  Patient Instructions   Otitis Externa   WHAT YOU NEED TO KNOW:   Otitis externa, or swimmer's ear, is an infection in the outer ear canal  This canal goes from the outside of the ear to the eardrum  DISCHARGE INSTRUCTIONS:   Seek care immediately if:   · You have severe ear pain      · You are suddenly unable to hear at all      · You have new swelling in your face, behind your ears, or in your neck      · You suddenly cannot move part of your face      · Your face suddenly feels numb      Contact your healthcare provider if:   · You have a fever      · Your signs and symptoms do not get better after 2 days of treatment       · Your signs and symptoms go away for a time, but then come back       · You have questions or concerns about your condition or care      Medicines:   · NSAIDs , such as ibuprofen, help decrease swelling, pain, and fever  This medicine is available with or without a doctor's order  NSAIDs can cause stomach bleeding or kidney problems in certain people  If you take blood thinner medicine, always ask if NSAIDs are safe for you  Always read the medicine label and follow directions  Do not give these medicines to children under 10months of age without direction from your child's healthcare provider       · Acetaminophen  decreases pain and fever  It is available without a doctor's order  Ask how much to take and how often to take it  Follow directions   Acetaminophen can cause liver damage if not taken correctly      · Ear drops  that contain an antibiotic may be given  The antibiotic helps treat a bacterial infection  You may also be given steroid medicine  The steroid helps decrease redness, swelling, and pain       · Take your medicine as directed  Contact your healthcare provider if you think your medicine is not helping or if you have side effects  Tell him or her if you are allergic to any medicine  Keep a list of the medicines, vitamins, and herbs you take  Include the amounts, and when and why you take them  Bring the list or the pill bottles to follow-up visits  Carry your medicine list with you in case of an emergency      Follow up with your healthcare provider as directed:  Write down your questions so you remember to ask them during your visits  How to use eardrops:   · Lie down on your side with your infected ear facing up       · Carefully drip the correct number of eardrops into your ear  Have another person help you if possible       · Gently move the outside part of your ear back and forth to help the medicine reach your ear canal       · Stay lying down in the same position (with your ear facing up) for 3 to 5 minutes      Prevent otitis externa:   · Do not put cotton swabs or foreign objects in your ears       · Wrap a clean moist washcloth around your finger, and use it to clean your outer ear and remove extra ear wax       · Use ear plugs when you swim  Dry your outer ears completely after you swim or bathe      © Copyright [address] Information is for End User's use only and may not be sold, redistributed or otherwise used for commercial purposes  All illustrations and images included in CareNotes® are the copyrighted property of A D A M , Inc  or [address] [place], [place] [address]elvis Rodriguez   The above information is an  only  It is not intended as medical advice for individual conditions or treatments   Talk to your doctor, nurse or pharmacist before following any medical regimen to see if it is safe and effective for you  Follow up with PCP in 3-5 days  Proceed to  ER if symptoms worsen  Chief Complaint     Chief Complaint   Patient presents with   • Earache     Patient c/o of right ear pain for the last 2 days c/o of pain when he eats         History of Present Illness       Patient is a 69-year-old male with no significant past medical history, who presents for evaluation of right ear pain and fullness over the past 2 days  The pain is aggravated by eating  He reports that 1-2 weeks ago began with a sensation of itching  He does use Q-tips regularly  He denies drainage from the ear, fever, headache, sore throat  Review of Systems   Review of Systems   Constitutional: Negative for fatigue and fever  HENT: Positive for ear pain  Negative for congestion, ear discharge, postnasal drip, rhinorrhea, sinus pressure, sinus pain, sneezing and sore throat  Eyes: Negative  Negative for pain, discharge, redness and itching  Respiratory: Negative  Negative for apnea, cough, choking, chest tightness, shortness of breath, wheezing and stridor  Cardiovascular: Negative  Negative for chest pain and palpitations  Gastrointestinal: Negative  Negative for diarrhea, nausea and vomiting  Endocrine: Negative  Negative for polydipsia, polyphagia and polyuria  Genitourinary: Negative  Negative for decreased urine volume and flank pain  Musculoskeletal: Negative  Negative for arthralgias, back pain, myalgias, neck pain and neck stiffness  Skin: Negative  Negative for color change and rash  Allergic/Immunologic: Negative  Negative for environmental allergies  Neurological: Negative  Negative for dizziness, facial asymmetry, light-headedness, numbness and headaches  Hematological: Negative  Negative for adenopathy  Psychiatric/Behavioral: Negative            Current Medications       Current Outpatient Medications:   •  ciprofloxacin-dexamethasone (CIPRODEX) otic suspension, Administer 4 drops to the right ear 2 (two) times a day for 7 days, Disp: 3 mL, Rfl: 0  •  ALPRAZolam (XANAX) 1 mg tablet, Take 1 tablet (1 mg total) by mouth daily as needed for anxiety, Disp: 15 tablet, Rfl: 0  •  Azelaic Acid (Finacea) 15 % cream, Use on face twice daily, Disp: 50 g, Rfl: 2  •  clindamycin (CLINDAGEL) 1 % gel, Apply topically 2 (two) times a day Apply to nose/back, Disp: 60 g, Rfl: 2  •  doxycycline hyclate (VIBRA-TABS) 100 mg tablet, Take 1 tablet (100 mg total) by mouth 2 (two) times a day, Disp: 180 tablet, Rfl: 1  •  levothyroxine 25 mcg tablet, TAKE ONE TABLET(25 mcg total) BY MOUTH ONE TIME DAILY, Disp: 90 tablet, Rfl: 0  •  zolpidem (AMBIEN) 10 mg tablet, Take 1 tablet (10 mg total) by mouth daily at bedtime as needed for sleep , Disp: 30 tablet, Rfl: 0    Current Facility-Administered Medications:   •  cyanocobalamin injection 1,000 mcg, 1,000 mcg, Intramuscular, Q30 Days, Leyla Jara MD, 1,000 mcg at 10/25/22 0856    Current Allergies     Allergies as of 10/29/2022 - Reviewed 10/29/2022   Allergen Reaction Noted   • Codeine GI Intolerance 06/19/2017   • Pine Allergic Rhinitis 04/05/2016   • Pollen extract Allergic Rhinitis 08/20/2015            The following portions of the patient's history were reviewed and updated as appropriate: allergies, current medications, past family history, past medical history, past social history, past surgical history and problem list      Past Medical History:   Diagnosis Date   • Anxiety    • BPH with obstruction/lower urinary tract symptoms    • BPH with obstruction/lower urinary tract symptoms    • Colon polyp    • Disease of thyroid gland    • Hypogonadism in male    • Lightheadedness     last assessed-8/20/2015   • Nocturia    • Nocturia    • Partial small bowel obstruction ([facility] [address] )     last assessed-4/14/2016   • Shoulder injury, left, initial encounter     resolved:5/22/2017   • Sixth cranial nerve palsy     last assessed-12/17/2015   • Sleep apnea    • Testicular pain, left 07/07/2020       Past Surgical History:   Procedure Laterality Date   • COLONOSCOPY     • NASAL SINUS SURGERY Bilateral    • TONSILLECTOMY         Family History   Problem Relation Age of Onset   • Cancer Other    • Diabetes Family    • Other Other         back disorder   • Heart disease Brother          Medications have been verified  Objective   /72 (BP Location: Right arm, Patient Position: Sitting, Cuff Size: Standard)   Pulse 70   Temp 98 °F (36 7 °C)   Resp 18   SpO2 98%        Physical Exam     Physical Exam  Vitals reviewed  Constitutional:       General: He is not in acute distress  Appearance: Normal appearance  He is not ill-appearing, toxic-appearing or diaphoretic  Interventions: He is not intubated  HENT:      Head: Normocephalic and atraumatic  Right Ear: Hearing, ear canal and external ear normal  Swelling and tenderness present  There is no impacted cerumen  Tympanic membrane is not erythematous or bulging  Left Ear: Hearing, tympanic membrane, ear canal and external ear normal  There is no impacted cerumen  Ears:      Comments: There is swelling of the right auditory canal, whitish exudate noted along the tympanic membrane and auditory canal      Nose: Nose normal  No congestion or rhinorrhea  Mouth/Throat:      Mouth: Mucous membranes are moist       Pharynx: Oropharynx is clear  Uvula midline  No pharyngeal swelling, oropharyngeal exudate, posterior oropharyngeal erythema or uvula swelling  Tonsils: No tonsillar exudate or tonsillar abscesses  1+ on the right  1+ on the left  Eyes:      Extraocular Movements: Extraocular movements intact  Conjunctiva/sclera: Conjunctivae normal       Pupils: Pupils are equal, round, and reactive to light  Cardiovascular:      Rate and Rhythm: Normal rate and regular rhythm  Pulses: Normal pulses        Heart sounds: Normal heart sounds, S1 normal and S2 normal  Heart sounds not distant  No murmur heard  No friction rub  No gallop  Pulmonary:      Effort: Pulmonary effort is normal  No tachypnea, bradypnea, accessory muscle usage, prolonged expiration, respiratory distress or retractions  He is not intubated  Breath sounds: Normal breath sounds  No stridor, decreased air movement or transmitted upper airway sounds  No decreased breath sounds, wheezing, rhonchi or rales  Chest:      Chest wall: No tenderness  Musculoskeletal:         General: Normal range of motion  Cervical back: Normal range of motion and neck supple  No rigidity or tenderness  Lymphadenopathy:      Cervical: No cervical adenopathy  Skin:     General: Skin is warm and dry  Capillary Refill: Capillary refill takes less than 2 seconds  Findings: No erythema  Neurological:      General: No focal deficit present  Mental Status: He is alert     Psychiatric:         Mood and Affect: Mood normal

## 2022-11-02 ENCOUNTER — CLINICAL SUPPORT (OUTPATIENT)
Dept: FAMILY MEDICINE CLINIC | Facility: CLINIC | Age: 67
End: 2022-11-02

## 2022-11-02 DIAGNOSIS — E53.8 VITAMIN B 12 DEFICIENCY: Primary | ICD-10-CM

## 2022-11-02 RX ADMIN — CYANOCOBALAMIN 1000 MCG: 1000 INJECTION, SOLUTION INTRAMUSCULAR; SUBCUTANEOUS at 10:19

## 2022-11-09 ENCOUNTER — CLINICAL SUPPORT (OUTPATIENT)
Dept: FAMILY MEDICINE CLINIC | Facility: CLINIC | Age: 67
End: 2022-11-09

## 2022-11-09 DIAGNOSIS — E53.8 VITAMIN B 12 DEFICIENCY: Primary | ICD-10-CM

## 2022-11-09 RX ADMIN — CYANOCOBALAMIN 1000 MCG: 1000 INJECTION, SOLUTION INTRAMUSCULAR; SUBCUTANEOUS at 10:00

## 2022-11-18 ENCOUNTER — CLINICAL SUPPORT (OUTPATIENT)
Dept: FAMILY MEDICINE CLINIC | Facility: CLINIC | Age: 67
End: 2022-11-18

## 2022-11-18 DIAGNOSIS — E53.8 CYANOCOBALAMIN DEFICIENCY: Primary | ICD-10-CM

## 2022-11-18 RX ADMIN — CYANOCOBALAMIN 1000 MCG: 1000 INJECTION, SOLUTION INTRAMUSCULAR; SUBCUTANEOUS at 10:08

## 2022-11-30 ENCOUNTER — CLINICAL SUPPORT (OUTPATIENT)
Dept: FAMILY MEDICINE CLINIC | Facility: CLINIC | Age: 67
End: 2022-11-30

## 2022-11-30 DIAGNOSIS — E53.8 VITAMIN B 12 DEFICIENCY: Primary | ICD-10-CM

## 2022-11-30 RX ADMIN — CYANOCOBALAMIN 1000 MCG: 1000 INJECTION, SOLUTION INTRAMUSCULAR; SUBCUTANEOUS at 10:20

## 2022-12-14 ENCOUNTER — CLINICAL SUPPORT (OUTPATIENT)
Dept: FAMILY MEDICINE CLINIC | Facility: CLINIC | Age: 67
End: 2022-12-14

## 2022-12-14 DIAGNOSIS — E53.8 CYANOCOBALAMIN DEFICIENCY: Primary | ICD-10-CM

## 2022-12-14 RX ADMIN — CYANOCOBALAMIN 1000 MCG: 1000 INJECTION, SOLUTION INTRAMUSCULAR; SUBCUTANEOUS at 10:10

## 2022-12-16 DIAGNOSIS — G47.00 INSOMNIA, UNSPECIFIED TYPE: ICD-10-CM

## 2022-12-16 RX ORDER — ZOLPIDEM TARTRATE 10 MG/1
TABLET ORAL
Qty: 30 TABLET | Refills: 0 | Status: SHIPPED | OUTPATIENT
Start: 2022-12-16

## 2023-01-11 ENCOUNTER — RA CDI HCC (OUTPATIENT)
Dept: OTHER | Facility: HOSPITAL | Age: 68
End: 2023-01-11

## 2023-01-20 ENCOUNTER — APPOINTMENT (OUTPATIENT)
Dept: LAB | Facility: CLINIC | Age: 68
End: 2023-01-20

## 2023-01-20 DIAGNOSIS — E78.2 MIXED HYPERLIPIDEMIA: ICD-10-CM

## 2023-01-20 DIAGNOSIS — E53.1 PYRIDOXINE DEFICIENCY: ICD-10-CM

## 2023-01-20 DIAGNOSIS — E53.8 CYANOCOBALAMIN DEFICIENCY: ICD-10-CM

## 2023-01-20 DIAGNOSIS — E55.9 VITAMIN D DEFICIENCY: ICD-10-CM

## 2023-01-20 DIAGNOSIS — E54 ASCORBIC ACID DEFICIENCY: ICD-10-CM

## 2023-01-20 DIAGNOSIS — E03.9 HYPOTHYROIDISM, UNSPECIFIED TYPE: ICD-10-CM

## 2023-01-20 DIAGNOSIS — F41.9 ANXIETY: ICD-10-CM

## 2023-01-20 LAB
25(OH)D3 SERPL-MCNC: 27.1 NG/ML (ref 30–100)
ALBUMIN SERPL BCP-MCNC: 4.3 G/DL (ref 3.5–5)
ALP SERPL-CCNC: 67 U/L (ref 34–104)
ALT SERPL W P-5'-P-CCNC: 11 U/L (ref 7–52)
ANION GAP SERPL CALCULATED.3IONS-SCNC: 6 MMOL/L (ref 4–13)
AST SERPL W P-5'-P-CCNC: 18 U/L (ref 13–39)
BASOPHILS # BLD AUTO: 0.04 THOUSANDS/ÂΜL (ref 0–0.1)
BASOPHILS NFR BLD AUTO: 1 % (ref 0–1)
BILIRUB SERPL-MCNC: 0.72 MG/DL (ref 0.2–1)
BUN SERPL-MCNC: 14 MG/DL (ref 5–25)
CALCIUM SERPL-MCNC: 9.4 MG/DL (ref 8.4–10.2)
CHLORIDE SERPL-SCNC: 105 MMOL/L (ref 96–108)
CO2 SERPL-SCNC: 29 MMOL/L (ref 21–32)
CREAT SERPL-MCNC: 1.1 MG/DL (ref 0.6–1.3)
EOSINOPHIL # BLD AUTO: 0.12 THOUSAND/ÂΜL (ref 0–0.61)
EOSINOPHIL NFR BLD AUTO: 2 % (ref 0–6)
ERYTHROCYTE [DISTWIDTH] IN BLOOD BY AUTOMATED COUNT: 13 % (ref 11.6–15.1)
GFR SERPL CREATININE-BSD FRML MDRD: 69 ML/MIN/1.73SQ M
GLUCOSE P FAST SERPL-MCNC: 92 MG/DL (ref 65–99)
HCT VFR BLD AUTO: 48.6 % (ref 36.5–49.3)
HGB BLD-MCNC: 16.2 G/DL (ref 12–17)
IMM GRANULOCYTES # BLD AUTO: 0.03 THOUSAND/UL (ref 0–0.2)
IMM GRANULOCYTES NFR BLD AUTO: 0 % (ref 0–2)
LYMPHOCYTES # BLD AUTO: 2.77 THOUSANDS/ÂΜL (ref 0.6–4.47)
LYMPHOCYTES NFR BLD AUTO: 39 % (ref 14–44)
MCH RBC QN AUTO: 29.9 PG (ref 26.8–34.3)
MCHC RBC AUTO-ENTMCNC: 33.3 G/DL (ref 31.4–37.4)
MCV RBC AUTO: 90 FL (ref 82–98)
MONOCYTES # BLD AUTO: 0.74 THOUSAND/ÂΜL (ref 0.17–1.22)
MONOCYTES NFR BLD AUTO: 10 % (ref 4–12)
NEUTROPHILS # BLD AUTO: 3.46 THOUSANDS/ÂΜL (ref 1.85–7.62)
NEUTS SEG NFR BLD AUTO: 48 % (ref 43–75)
NRBC BLD AUTO-RTO: 0 /100 WBCS
PLATELET # BLD AUTO: 272 THOUSANDS/UL (ref 149–390)
PMV BLD AUTO: 9.4 FL (ref 8.9–12.7)
POTASSIUM SERPL-SCNC: 4 MMOL/L (ref 3.5–5.3)
PROT SERPL-MCNC: 7.2 G/DL (ref 6.4–8.4)
RBC # BLD AUTO: 5.41 MILLION/UL (ref 3.88–5.62)
SODIUM SERPL-SCNC: 140 MMOL/L (ref 135–147)
TSH SERPL DL<=0.05 MIU/L-ACNC: 3.51 UIU/ML (ref 0.45–4.5)
VIT B12 SERPL-MCNC: 430 PG/ML (ref 100–900)
WBC # BLD AUTO: 7.16 THOUSAND/UL (ref 4.31–10.16)

## 2023-01-23 RX ORDER — LEVOTHYROXINE SODIUM 0.03 MG/1
25 TABLET ORAL DAILY
Qty: 90 TABLET | Refills: 0 | Status: SHIPPED | OUTPATIENT
Start: 2023-01-23

## 2023-01-23 RX ORDER — ALPRAZOLAM 1 MG/1
1 TABLET ORAL DAILY PRN
Qty: 15 TABLET | Refills: 0 | Status: SHIPPED | OUTPATIENT
Start: 2023-01-23

## 2023-01-26 LAB — VIT C SERPL-MCNC: 0.8 MG/DL (ref 0.4–2)

## 2023-01-27 LAB — VIT B6 SERPL-MCNC: 18.7 UG/L (ref 3.4–65.2)

## 2023-02-03 ENCOUNTER — TELEPHONE (OUTPATIENT)
Dept: FAMILY MEDICINE CLINIC | Facility: CLINIC | Age: 68
End: 2023-02-03

## 2023-02-03 NOTE — TELEPHONE ENCOUNTER
----- Message from Hanh-Dung Greer Paget, MD sent at 2/2/2023  5:48 PM EST -----  Please let pt know his blood test results show his vitamins levels are improving, continue with the vitamins as before to keep levels therapeutic    Dr Roslyn King will discuss w pt in details

## 2023-03-30 DIAGNOSIS — G47.00 INSOMNIA, UNSPECIFIED TYPE: ICD-10-CM

## 2023-03-30 RX ORDER — ZOLPIDEM TARTRATE 10 MG/1
10 TABLET ORAL
Qty: 30 TABLET | Refills: 0 | Status: SHIPPED | OUTPATIENT
Start: 2023-03-30

## 2023-03-30 NOTE — TELEPHONE ENCOUNTER
Fax received from pharmacy requesting medication refill on patients zolpidem tartrate 10 mg TAB  Medication sent for approval at this time

## 2023-05-01 ENCOUNTER — HOSPITAL ENCOUNTER (OUTPATIENT)
Dept: MRI IMAGING | Facility: HOSPITAL | Age: 68
Discharge: HOME | End: 2023-05-01

## 2023-05-01 DIAGNOSIS — M43.16 SPONDYLOLISTHESIS, LUMBAR REGION: ICD-10-CM

## 2023-05-13 DIAGNOSIS — F41.9 ANXIETY: ICD-10-CM

## 2023-05-13 NOTE — TELEPHONE ENCOUNTER
Medication Refill Request     Name Xanax 1 mg   Dose/Frequency Take 1 tablet (1 mg total) by mouth daily as needed for anxiety  Quantity 15 tablet  Verified pharmacy   [x]  Verified ordering Provider   [x]  Does patient have enough for the next 3 days? Yes [] No [x]      Pt requesting 30 tablets instead of 15

## 2023-05-15 RX ORDER — ALPRAZOLAM 1 MG/1
1 TABLET ORAL DAILY PRN
Qty: 15 TABLET | Refills: 0 | Status: SHIPPED | OUTPATIENT
Start: 2023-05-15 | End: 2023-05-19 | Stop reason: SDUPTHER

## 2023-05-19 ENCOUNTER — TELEPHONE (OUTPATIENT)
Dept: FAMILY MEDICINE CLINIC | Facility: CLINIC | Age: 68
End: 2023-05-19

## 2023-05-19 DIAGNOSIS — F41.9 ANXIETY: ICD-10-CM

## 2023-05-19 RX ORDER — ALPRAZOLAM 1 MG/1
1 TABLET ORAL DAILY PRN
Qty: 15 TABLET | Refills: 0 | Status: SHIPPED | OUTPATIENT
Start: 2023-05-19

## 2023-05-19 NOTE — TELEPHONE ENCOUNTER
My name is Lee Schumacher, Date of birth [birth year]  I'm trying to get a prescription refilled  You sent the prescription to [facility]? That's not where was to go  They won't fill it if you would send it to [place]  It's a generic Xanax 1 milligram   Isaiah, thank you  Bye  You received a voice mail from [facility]

## 2023-07-15 DIAGNOSIS — E03.9 HYPOTHYROIDISM, UNSPECIFIED TYPE: ICD-10-CM

## 2023-07-17 RX ORDER — LEVOTHYROXINE SODIUM 0.03 MG/1
TABLET ORAL
Qty: 90 TABLET | Refills: 0 | OUTPATIENT
Start: 2023-07-17

## 2023-07-26 ENCOUNTER — OFFICE VISIT (OUTPATIENT)
Dept: FAMILY MEDICINE CLINIC | Facility: CLINIC | Age: 68
End: 2023-07-26
Payer: MEDICARE

## 2023-07-26 ENCOUNTER — APPOINTMENT (OUTPATIENT)
Dept: LAB | Facility: CLINIC | Age: 68
End: 2023-07-26
Payer: MEDICARE

## 2023-07-26 VITALS
DIASTOLIC BLOOD PRESSURE: 70 MMHG | HEIGHT: 73 IN | HEART RATE: 88 BPM | BODY MASS INDEX: 27.8 KG/M2 | RESPIRATION RATE: 16 BRPM | OXYGEN SATURATION: 98 % | SYSTOLIC BLOOD PRESSURE: 110 MMHG | WEIGHT: 209.8 LBS | TEMPERATURE: 97.7 F

## 2023-07-26 DIAGNOSIS — Z13.21 SCREENING FOR ENDOCRINE, NUTRITIONAL, METABOLIC AND IMMUNITY DISORDER: ICD-10-CM

## 2023-07-26 DIAGNOSIS — R14.0 BLOATING: ICD-10-CM

## 2023-07-26 DIAGNOSIS — R63.4 UNINTENDED WEIGHT LOSS: ICD-10-CM

## 2023-07-26 DIAGNOSIS — G47.00 INSOMNIA, UNSPECIFIED TYPE: ICD-10-CM

## 2023-07-26 DIAGNOSIS — Z13.29 SCREENING FOR ENDOCRINE, NUTRITIONAL, METABOLIC AND IMMUNITY DISORDER: ICD-10-CM

## 2023-07-26 DIAGNOSIS — Z13.0 SCREENING FOR ENDOCRINE, NUTRITIONAL, METABOLIC AND IMMUNITY DISORDER: ICD-10-CM

## 2023-07-26 DIAGNOSIS — E16.1 OTHER HYPOGLYCEMIA: ICD-10-CM

## 2023-07-26 DIAGNOSIS — K52.9 CHRONIC DIARRHEA: Primary | ICD-10-CM

## 2023-07-26 DIAGNOSIS — Z13.228 SCREENING FOR ENDOCRINE, NUTRITIONAL, METABOLIC AND IMMUNITY DISORDER: ICD-10-CM

## 2023-07-26 DIAGNOSIS — K52.9 CHRONIC DIARRHEA: ICD-10-CM

## 2023-07-26 DIAGNOSIS — E03.9 HYPOTHYROIDISM, UNSPECIFIED TYPE: ICD-10-CM

## 2023-07-26 DIAGNOSIS — R15.2 FECAL URGENCY: ICD-10-CM

## 2023-07-26 DIAGNOSIS — F41.9 ANXIETY: ICD-10-CM

## 2023-07-26 DIAGNOSIS — Z76.89 ENCOUNTER TO ESTABLISH CARE: ICD-10-CM

## 2023-07-26 LAB
ALBUMIN SERPL BCP-MCNC: 4.4 G/DL (ref 3.5–5)
ALP SERPL-CCNC: 63 U/L (ref 34–104)
ALT SERPL W P-5'-P-CCNC: 8 U/L (ref 7–52)
AMYLASE SERPL-CCNC: 41 IU/L (ref 29–103)
ANION GAP SERPL CALCULATED.3IONS-SCNC: 6 MMOL/L
AST SERPL W P-5'-P-CCNC: 13 U/L (ref 13–39)
BASOPHILS # BLD AUTO: 0.04 THOUSANDS/ÂΜL (ref 0–0.1)
BASOPHILS NFR BLD AUTO: 1 % (ref 0–1)
BILIRUB SERPL-MCNC: 0.55 MG/DL (ref 0.2–1)
BUN SERPL-MCNC: 11 MG/DL (ref 5–25)
CALCIUM SERPL-MCNC: 9.4 MG/DL (ref 8.4–10.2)
CHLORIDE SERPL-SCNC: 105 MMOL/L (ref 96–108)
CO2 SERPL-SCNC: 29 MMOL/L (ref 21–32)
CREAT SERPL-MCNC: 1.01 MG/DL (ref 0.6–1.3)
EOSINOPHIL # BLD AUTO: 0.07 THOUSAND/ÂΜL (ref 0–0.61)
EOSINOPHIL NFR BLD AUTO: 1 % (ref 0–6)
ERYTHROCYTE [DISTWIDTH] IN BLOOD BY AUTOMATED COUNT: 13.2 % (ref 11.6–15.1)
EST. AVERAGE GLUCOSE BLD GHB EST-MCNC: 103 MG/DL
GFR SERPL CREATININE-BSD FRML MDRD: 76 ML/MIN/1.73SQ M
GLUCOSE SERPL-MCNC: 91 MG/DL (ref 65–140)
HBA1C MFR BLD: 5.2 %
HCT VFR BLD AUTO: 46.3 % (ref 36.5–49.3)
HGB BLD-MCNC: 15.3 G/DL (ref 12–17)
IMM GRANULOCYTES # BLD AUTO: 0.04 THOUSAND/UL (ref 0–0.2)
IMM GRANULOCYTES NFR BLD AUTO: 1 % (ref 0–2)
LIPASE SERPL-CCNC: 27 U/L (ref 11–82)
LYMPHOCYTES # BLD AUTO: 1.94 THOUSANDS/ÂΜL (ref 0.6–4.47)
LYMPHOCYTES NFR BLD AUTO: 28 % (ref 14–44)
MAGNESIUM SERPL-MCNC: 2.1 MG/DL (ref 1.9–2.7)
MCH RBC QN AUTO: 29.9 PG (ref 26.8–34.3)
MCHC RBC AUTO-ENTMCNC: 33 G/DL (ref 31.4–37.4)
MCV RBC AUTO: 91 FL (ref 82–98)
MONOCYTES # BLD AUTO: 0.65 THOUSAND/ÂΜL (ref 0.17–1.22)
MONOCYTES NFR BLD AUTO: 9 % (ref 4–12)
NEUTROPHILS # BLD AUTO: 4.2 THOUSANDS/ÂΜL (ref 1.85–7.62)
NEUTS SEG NFR BLD AUTO: 60 % (ref 43–75)
NRBC BLD AUTO-RTO: 0 /100 WBCS
PLATELET # BLD AUTO: 300 THOUSANDS/UL (ref 149–390)
PMV BLD AUTO: 9.1 FL (ref 8.9–12.7)
POTASSIUM SERPL-SCNC: 4 MMOL/L (ref 3.5–5.3)
PROT SERPL-MCNC: 6.9 G/DL (ref 6.4–8.4)
RBC # BLD AUTO: 5.11 MILLION/UL (ref 3.88–5.62)
SODIUM SERPL-SCNC: 140 MMOL/L (ref 135–147)
T3FREE SERPL-MCNC: 3.18 PG/ML (ref 2.5–3.9)
TSH SERPL DL<=0.05 MIU/L-ACNC: 3.09 UIU/ML (ref 0.45–4.5)
WBC # BLD AUTO: 6.94 THOUSAND/UL (ref 4.31–10.16)

## 2023-07-26 PROCEDURE — 84481 FREE ASSAY (FT-3): CPT

## 2023-07-26 PROCEDURE — 80053 COMPREHEN METABOLIC PANEL: CPT

## 2023-07-26 PROCEDURE — 83690 ASSAY OF LIPASE: CPT

## 2023-07-26 PROCEDURE — 85025 COMPLETE CBC W/AUTO DIFF WBC: CPT

## 2023-07-26 PROCEDURE — 83735 ASSAY OF MAGNESIUM: CPT | Performed by: NURSE PRACTITIONER

## 2023-07-26 PROCEDURE — 84443 ASSAY THYROID STIM HORMONE: CPT

## 2023-07-26 PROCEDURE — 99214 OFFICE O/P EST MOD 30 MIN: CPT | Performed by: NURSE PRACTITIONER

## 2023-07-26 PROCEDURE — 82150 ASSAY OF AMYLASE: CPT

## 2023-07-26 PROCEDURE — 83036 HEMOGLOBIN GLYCOSYLATED A1C: CPT

## 2023-07-26 RX ORDER — ZOLPIDEM TARTRATE 10 MG/1
10 TABLET ORAL
Qty: 30 TABLET | Refills: 3 | Status: SHIPPED | OUTPATIENT
Start: 2023-07-26

## 2023-07-26 RX ORDER — LEVOTHYROXINE SODIUM 0.03 MG/1
25 TABLET ORAL DAILY
Qty: 90 TABLET | Refills: 1 | Status: SHIPPED | OUTPATIENT
Start: 2023-07-26

## 2023-07-26 RX ORDER — ALPRAZOLAM 1 MG/1
1 TABLET ORAL DAILY PRN
Qty: 15 TABLET | Refills: 3 | Status: SHIPPED | OUTPATIENT
Start: 2023-07-26

## 2023-07-26 NOTE — PROGRESS NOTES
Assessment/Plan:    1. Chronic diarrhea  -     Clostridium difficile toxin by PCR; Future  -     Ova and parasite examination; Future  -     Stool Enteric Bacterial Panel by PCR; Future  -     CBC and differential; Future  -     Lipase; Future  -     Hemoglobin A1C; Future  -     Magnesium  -     Comprehensive metabolic panel; Future  -     TSH, 3rd generation with Free T4 reflex; Future  -     T3, free; Future  -     Amylase; Future    2. Encounter to establish care    3. Fecal urgency  -     Clostridium difficile toxin by PCR; Future  -     Ova and parasite examination; Future  -     Stool Enteric Bacterial Panel by PCR; Future    4. Bloating  -     Clostridium difficile toxin by PCR; Future  -     Ova and parasite examination; Future  -     Stool Enteric Bacterial Panel by PCR; Future  -     CBC and differential; Future  -     Lipase; Future  -     Hemoglobin A1C; Future  -     Magnesium  -     Comprehensive metabolic panel; Future  -     TSH, 3rd generation with Free T4 reflex; Future  -     T3, free; Future  -     Amylase; Future    5. Screening for endocrine, nutritional, metabolic and immunity disorder  -     Lipase; Future  -     Hemoglobin A1C; Future  -     Magnesium  -     Comprehensive metabolic panel; Future  -     TSH, 3rd generation with Free T4 reflex; Future  -     T3, free; Future  -     Amylase; Future    6. Unintended weight loss  -     Clostridium difficile toxin by PCR; Future  -     Ova and parasite examination; Future  -     Stool Enteric Bacterial Panel by PCR; Future  -     CBC and differential; Future  -     Lipase; Future  -     Hemoglobin A1C; Future  -     Magnesium  -     Comprehensive metabolic panel; Future  -     TSH, 3rd generation with Free T4 reflex; Future  -     T3, free; Future  -     Amylase; Future    7. Hypothyroidism, unspecified type  -     TSH, 3rd generation with Free T4 reflex; Future  -     T3, free; Future  -     levothyroxine 25 mcg tablet;  Take 1 tablet (25 mcg total) by mouth daily    8. Other hypoglycemia  -     Hemoglobin A1C; Future    9. Anxiety  -     ALPRAZolam (XANAX) 1 mg tablet; Take 1 tablet (1 mg total) by mouth daily as needed for anxiety    10. Insomnia, unspecified type  -     zolpidem (AMBIEN) 10 mg tablet; Take 1 tablet (10 mg total) by mouth daily at bedtime as needed for sleep        The case discussed with patient using patient centered shared decision making. The patient was counseled regarding instructions for management,-- risk factor reductions,-- prognosis,-- impressions,-- risks and benefits of treatment options,-- importance of compliance with treatment. I have reviewed the instructions with the patient, answering all questions to his satisfaction. Diarrhea of unclear etiology     probiotics  Adhere to simple, bland diet. Further follow-up plans will be based on outcome of lab/imaging studies; see orders. Follow up in 2 weeks or as needed. Return in about 2 weeks (around 8/9/2023). I have spent a total time of 30 minutes on 07/26/23 in caring for this patient including Prognosis, Risks and benefits of tx options, Instructions for management, Patient and family education, Importance of tx compliance, Risk factor reductions, Impressions, Counseling / Coordination of care, Documenting in the medical record, Reviewing / ordering tests, medicine, procedures   and Obtaining or reviewing history  . Subjective:      Patient ID: Loy Hannah is a 80 y.o. male. Chief Complaint   Patient presents with   • Establish Care     Establish care   • Diarrhea     Having diarrhea for over a month        Diarrhea   This is a new problem. The current episode started more than 1 month ago. The problem occurs 2 to 4 times per day. The problem has been waxing and waning. Diarrhea characteristics: watery foul smelling undigested food. The patient states that diarrhea awakens him from sleep.  Associated symptoms include abdominal pain, bloating and weight loss. Pertinent negatives include no arthralgias, chills, coughing, fever, headaches, increased  flatus, myalgias or vomiting. Nothing aggravates the symptoms. There are no known risk factors. He has tried bismuth subsalicylate for the symptoms. The treatment provided no relief. There is no history of bowel resection, inflammatory bowel disease, irritable bowel syndrome, malabsorption or a recent abdominal surgery. had unremarkable EGD/colonoscopy 8/2022     Reviewed medical history in detail. Changes to medical record changed where appropriate. Reviewed medications. Patient taking as prescribed. Tolerating well. Denies Side effects. Reviewed recent visits with specialists co-managing chronic conditions. The following portions of the patient's history were reviewed and updated as appropriate: allergies, current medications, past family history, past medical history, past social history, past surgical history and problem list.    Review of Systems   Constitutional: Positive for unexpected weight change and weight loss. Negative for chills, fatigue and fever. HENT: Negative for trouble swallowing. Respiratory: Negative for cough. Gastrointestinal: Positive for abdominal pain, bloating and diarrhea. Negative for abdominal distention, blood in stool, flatus, nausea and vomiting. Genitourinary: Negative for difficulty urinating and hematuria. Musculoskeletal: Negative for arthralgias, gait problem and myalgias. Neurological: Negative for dizziness, weakness and headaches. Hematological: Negative for adenopathy. Does not bruise/bleed easily.          Current Outpatient Medications   Medication Sig Dispense Refill   • ALPRAZolam (XANAX) 1 mg tablet Take 1 tablet (1 mg total) by mouth daily as needed for anxiety 15 tablet 3   • Azelaic Acid (Finacea) 15 % cream Use on face twice daily 50 g 2   • clindamycin (CLINDAGEL) 1 % gel Apply topically 2 (two) times a day Apply to nose/back 60 g 2   • levothyroxine 25 mcg tablet Take 1 tablet (25 mcg total) by mouth daily 90 tablet 1   • zolpidem (AMBIEN) 10 mg tablet Take 1 tablet (10 mg total) by mouth daily at bedtime as needed for sleep 30 tablet 3   • amoxicillin (AMOXIL) 500 mg capsule      • ciprofloxacin-dexamethasone (CIPRODEX) otic suspension Administer 4 drops to the right ear 2 (two) times a day for 7 days 3 mL 0     Current Facility-Administered Medications   Medication Dose Route Frequency Provider Last Rate Last Admin   • cyanocobalamin injection 1,000 mcg  1,000 mcg Intramuscular Q30 Days Leyla Sagastume MD   1,000 mcg at 02/15/23 1155       Patient Active Problem List   Diagnosis   • Anxiety   • Chronic left-sided low back pain   • Spondylosis of cervical region without myelopathy or radiculopathy   • Weakness of left leg   • Radiculopathy, lumbar region   • Hyperlipidemia   • Enlarged prostate without lower urinary tract symptoms (luts)   • Cervical radiculitis   • Obstructive sleep apnea   • Insomnia   • Hypothyroidism   • Rosacea   • Lower abdominal pain   • Excessive flatus   • Other constipation   • Acute renal failure (HCC)   • Stage 3a chronic kidney disease (HCC)   • Change in bowel habits   • Bloating   • History of colon polyps   • BPH with obstruction/lower urinary tract symptoms   • Acne vulgaris   • History of tobacco use   • History of alcohol use   • Hypertriglyceridemia   • Pyridoxine deficiency   • Ascorbic acid deficiency   • Vitamin D deficiency   • Cyanocobalamin deficiency       Objective:    /70 (BP Location: Left arm, Patient Position: Sitting, Cuff Size: Large)   Pulse 88   Temp 97.7 °F (36.5 °C) (Temporal)   Resp 16   Ht 6' 1" (1.854 m)   Wt 95.2 kg (209 lb 12.8 oz)   SpO2 98%   BMI 27.68 kg/m²        Physical Exam  Vitals and nursing note reviewed. Constitutional:       General: He is not in acute distress. Appearance: Normal appearance. He is obese. HENT:      Head: Normocephalic and atraumatic. Neck:      Vascular: No carotid bruit. Cardiovascular:      Rate and Rhythm: Normal rate and regular rhythm. Pulses: Normal pulses. Heart sounds: Normal heart sounds. Pulmonary:      Effort: Pulmonary effort is normal.      Breath sounds: Normal breath sounds. No wheezing or rales. Abdominal:      General: Bowel sounds are increased. Palpations: Abdomen is soft. Tenderness: There is no abdominal tenderness. Hernia: No hernia is present. Musculoskeletal:      Right lower leg: No edema. Left lower leg: No edema. Lymphadenopathy:      Cervical: No cervical adenopathy. Skin:     General: Skin is warm and dry. Coloration: Skin is not pale. Neurological:      General: No focal deficit present. Mental Status: He is alert. Motor: No weakness.       Gait: Gait normal.   Psychiatric:         Mood and Affect: Mood normal.         Behavior: Behavior normal.                SRUTHI Barnett

## 2023-07-27 LAB
ENDOMYSIUM IGA SER QL: NEGATIVE
GLIADIN PEPTIDE IGA SER-ACNC: 6 UNITS (ref 0–19)
GLIADIN PEPTIDE IGG SER-ACNC: 2 UNITS (ref 0–19)
IGA SERPL-MCNC: 179 MG/DL (ref 61–437)
TTG IGA SER-ACNC: <2 U/ML (ref 0–3)
TTG IGG SER-ACNC: <2 U/ML (ref 0–5)

## 2023-07-28 ENCOUNTER — APPOINTMENT (OUTPATIENT)
Dept: LAB | Facility: CLINIC | Age: 68
End: 2023-07-28
Payer: MEDICARE

## 2023-07-28 DIAGNOSIS — R15.2 FECAL URGENCY: ICD-10-CM

## 2023-07-28 DIAGNOSIS — R63.4 UNINTENDED WEIGHT LOSS: ICD-10-CM

## 2023-07-28 DIAGNOSIS — K52.9 CHRONIC DIARRHEA: ICD-10-CM

## 2023-07-28 DIAGNOSIS — R14.0 BLOATING: ICD-10-CM

## 2023-07-28 LAB
C DIFF TOX GENS STL QL NAA+PROBE: NEGATIVE
CAMPYLOBACTER DNA SPEC NAA+PROBE: NORMAL
SALMONELLA DNA SPEC QL NAA+PROBE: NORMAL
SHIGA TOXIN STX GENE SPEC NAA+PROBE: NORMAL
SHIGELLA DNA SPEC QL NAA+PROBE: NORMAL

## 2023-07-28 PROCEDURE — 87177 OVA AND PARASITES SMEARS: CPT

## 2023-07-28 PROCEDURE — 87493 C DIFF AMPLIFIED PROBE: CPT

## 2023-07-28 PROCEDURE — 87505 NFCT AGENT DETECTION GI: CPT

## 2023-07-28 PROCEDURE — 87209 SMEAR COMPLEX STAIN: CPT

## 2023-08-28 DIAGNOSIS — L71.9 ROSACEA: ICD-10-CM

## 2023-08-28 DIAGNOSIS — L70.0 ACNE VULGARIS: ICD-10-CM

## 2023-08-28 RX ORDER — CLINDAMYCIN PHOSPHATE 10 MG/G
GEL TOPICAL 2 TIMES DAILY
Qty: 60 G | Refills: 2 | Status: SHIPPED | OUTPATIENT
Start: 2023-08-28

## 2023-08-28 NOTE — TELEPHONE ENCOUNTER
Fax received from pharmacy requesting refill on patients clindamycin phosphate external gel 1%. Medication sent for refill at this time.

## 2023-08-29 ENCOUNTER — HOSPITAL ENCOUNTER (OUTPATIENT)
Dept: CT IMAGING | Facility: HOSPITAL | Age: 68
Discharge: HOME | End: 2023-08-29
Payer: MEDICARE

## 2023-08-29 ENCOUNTER — OFFICE VISIT (OUTPATIENT)
Dept: FAMILY MEDICINE CLINIC | Facility: CLINIC | Age: 68
End: 2023-08-29
Payer: MEDICARE

## 2023-08-29 VITALS
BODY MASS INDEX: 27.75 KG/M2 | HEART RATE: 78 BPM | WEIGHT: 209.4 LBS | TEMPERATURE: 96.9 F | OXYGEN SATURATION: 98 % | RESPIRATION RATE: 16 BRPM | HEIGHT: 73 IN | DIASTOLIC BLOOD PRESSURE: 70 MMHG | SYSTOLIC BLOOD PRESSURE: 120 MMHG

## 2023-08-29 DIAGNOSIS — R10.9 CHRONIC ABDOMINAL PAIN: ICD-10-CM

## 2023-08-29 DIAGNOSIS — G89.29 CHRONIC ABDOMINAL PAIN: ICD-10-CM

## 2023-08-29 DIAGNOSIS — K52.9 CHRONIC DIARRHEA: ICD-10-CM

## 2023-08-29 DIAGNOSIS — R14.0 BLOATING: ICD-10-CM

## 2023-08-29 DIAGNOSIS — K52.9 CHRONIC DIARRHEA: Primary | ICD-10-CM

## 2023-08-29 DIAGNOSIS — R63.4 UNINTENDED WEIGHT LOSS: ICD-10-CM

## 2023-08-29 PROCEDURE — 74177 CT ABD & PELVIS W/CONTRAST: CPT

## 2023-08-29 PROCEDURE — 99214 OFFICE O/P EST MOD 30 MIN: CPT | Performed by: NURSE PRACTITIONER

## 2023-08-29 PROCEDURE — G1004 CDSM NDSC: HCPCS

## 2023-08-29 RX ORDER — IODIXANOL 320 MG/ML
100 INJECTION, SOLUTION INTRAVASCULAR
Status: COMPLETED | OUTPATIENT
Start: 2023-08-29 | End: 2023-08-29

## 2023-08-29 RX ADMIN — IODIXANOL 100 ML: 320 INJECTION, SOLUTION INTRAVASCULAR at 13:59

## 2023-08-29 NOTE — PROGRESS NOTES
Assessment/Plan:    1. Chronic diarrhea  -     Ambulatory Referral to Gastroenterology; Future  -     CT abdomen pelvis w contrast; Future; Expected date: 08/29/2023    2. Bloating  -     Ambulatory Referral to Gastroenterology; Future  -     CT abdomen pelvis w contrast; Future; Expected date: 08/29/2023    3. Chronic abdominal pain  -     Ambulatory Referral to Gastroenterology; Future  -     CT abdomen pelvis w contrast; Future; Expected date: 08/29/2023    4. Unintended weight loss  -     Ambulatory Referral to Gastroenterology; Future  -     CT abdomen pelvis w contrast; Future; Expected date: 08/29/2023        The case discussed with patient using patient centered shared decision making. The patient was counseled regarding instructions for management,-- risk factor reductions,-- prognosis,-- impressions,-- risks and benefits of treatment options,-- importance of compliance with treatment. I have reviewed the instructions with the patient, answering all questions to his satisfaction. Chronic diarrhea, intermittent abdominal pain, weight loss of unclear etiology  Thus far work up unrevealing  Stool testing (c diff, bacterial, o&P) and comprehensive labs normal/negative  Symptom set remains unchanged    Escalate w/u->CT AP, gastro referral for possible scope  Follow up in 2 weeks or as needed. No follow-ups on file. I have spent a total time of 30 minutes on 08/29/23 in caring for this patient including Prognosis, Risks and benefits of tx options, Instructions for management, Patient and family education, Importance of tx compliance, Risk factor reductions, Impressions, Counseling / Coordination of care, Documenting in the medical record, Reviewing / ordering tests, medicine, procedures   and Obtaining or reviewing history  . Subjective:      Patient ID: Mily Coppola is a 80 y.o. male.     Chief Complaint   Patient presents with   • Follow-up       Here for follow up of persistent diarrhea, intermittent abdominal pain without change  I saw him initially 7/26/23  Stool and blood testing unremarkable    Weight loss leveled out at 20 pounds lost and has stablized          Diarrhea   Chronicity: persistent. The current episode started more than 1 month ago. The problem occurs 2 to 4 times per day. The problem has been waxing and waning. Diarrhea characteristics: watery foul smelling undigested food. The patient states that diarrhea awakens him from sleep. Associated symptoms include abdominal pain, bloating and weight loss. Pertinent negatives include no arthralgias, chills, coughing, fever, headaches, increased  flatus, myalgias or vomiting. Nothing aggravates the symptoms. There are no known risk factors. He has tried bismuth subsalicylate for the symptoms. The treatment provided no relief. There is no history of bowel resection, inflammatory bowel disease, irritable bowel syndrome, malabsorption or a recent abdominal surgery. had unremarkable EGD/colonoscopy 8/2022     Reviewed medical history in detail. Changes to medical record changed where appropriate. Reviewed medications. Patient taking as prescribed. Tolerating well. Denies Side effects. Reviewed recent visits with specialists co-managing chronic conditions. The following portions of the patient's history were reviewed and updated as appropriate: allergies, current medications, past family history, past medical history, past social history, past surgical history and problem list.    Review of Systems   Constitutional: Positive for unexpected weight change and weight loss. Negative for chills, fatigue and fever. HENT: Negative for trouble swallowing. Respiratory: Negative for cough. Gastrointestinal: Positive for abdominal pain, bloating and diarrhea. Negative for abdominal distention, blood in stool, flatus, nausea and vomiting. Genitourinary: Negative for difficulty urinating and hematuria.    Musculoskeletal: Negative for arthralgias, gait problem and myalgias. Neurological: Negative for dizziness, weakness and headaches. Hematological: Negative for adenopathy. Does not bruise/bleed easily.          Current Outpatient Medications   Medication Sig Dispense Refill   • ALPRAZolam (XANAX) 1 mg tablet Take 1 tablet (1 mg total) by mouth daily as needed for anxiety 15 tablet 3   • Azelaic Acid (Finacea) 15 % cream Use on face twice daily 50 g 2   • clindamycin (CLINDAGEL) 1 % gel Apply topically 2 (two) times a day Apply to nose/back 60 g 2   • levothyroxine 25 mcg tablet Take 1 tablet (25 mcg total) by mouth daily 90 tablet 1   • zolpidem (AMBIEN) 10 mg tablet Take 1 tablet (10 mg total) by mouth daily at bedtime as needed for sleep 30 tablet 3   • amoxicillin (AMOXIL) 500 mg capsule  (Patient not taking: Reported on 8/29/2023)     • ciprofloxacin-dexamethasone (CIPRODEX) otic suspension Administer 4 drops to the right ear 2 (two) times a day for 7 days (Patient not taking: Reported on 8/29/2023) 3 mL 0     Current Facility-Administered Medications   Medication Dose Route Frequency Provider Last Rate Last Admin   • cyanocobalamin injection 1,000 mcg  1,000 mcg Intramuscular Q30 Days Leyla Beaver MD   1,000 mcg at 02/15/23 1155       Patient Active Problem List   Diagnosis   • Anxiety   • Chronic left-sided low back pain   • Spondylosis of cervical region without myelopathy or radiculopathy   • Weakness of left leg   • Radiculopathy, lumbar region   • Hyperlipidemia   • Enlarged prostate without lower urinary tract symptoms (luts)   • Cervical radiculitis   • Obstructive sleep apnea   • Insomnia   • Hypothyroidism   • Rosacea   • Lower abdominal pain   • Excessive flatus   • Other constipation   • Acute renal failure (HCC)   • Stage 3a chronic kidney disease ([address])   • Change in bowel habits   • Bloating   • History of colon polyps   • BPH with obstruction/lower urinary tract symptoms   • Acne vulgaris   • History of tobacco use • History of alcohol use   • Hypertriglyceridemia   • Pyridoxine deficiency   • Ascorbic acid deficiency   • Vitamin D deficiency   • Cyanocobalamin deficiency       Objective:    /70 (BP Location: Left arm, Patient Position: Sitting, Cuff Size: Standard)   Pulse 78   Temp (!) 96.9 °F (36.1 °C) (Temporal)   Resp 16   Ht 6' 1" (1.854 m)   Wt 95 kg (209 lb 6.4 oz)   SpO2 98%   BMI 27.63 kg/m²        Physical Exam  Vitals and nursing note reviewed. Constitutional:       General: He is not in acute distress. Appearance: Normal appearance. He is obese. HENT:      Head: Normocephalic and atraumatic. Neck:      Vascular: No carotid bruit. Cardiovascular:      Rate and Rhythm: Normal rate and regular rhythm. Pulses: Normal pulses. Heart sounds: Normal heart sounds. Pulmonary:      Effort: Pulmonary effort is normal.      Breath sounds: Normal breath sounds. No wheezing or rales. Abdominal:      General: Bowel sounds are increased. Palpations: Abdomen is soft. Tenderness: There is no abdominal tenderness. Hernia: No hernia is present. Musculoskeletal:      Right lower leg: No edema. Left lower leg: No edema. Lymphadenopathy:      Cervical: No cervical adenopathy. Skin:     General: Skin is warm and dry. Coloration: Skin is not pale. Neurological:      General: No focal deficit present. Mental Status: He is alert. Motor: No weakness.       Gait: Gait normal.   Psychiatric:         Mood and Affect: Mood normal.         Behavior: Behavior normal.                SRUTHI Calvin

## 2023-08-31 ENCOUNTER — OFFICE VISIT (OUTPATIENT)
Dept: GASTROENTEROLOGY | Facility: AMBULARY SURGERY CENTER | Age: 68
End: 2023-08-31
Payer: MEDICARE

## 2023-08-31 VITALS
BODY MASS INDEX: 27.57 KG/M2 | SYSTOLIC BLOOD PRESSURE: 122 MMHG | WEIGHT: 208 LBS | DIASTOLIC BLOOD PRESSURE: 68 MMHG | HEART RATE: 60 BPM | HEIGHT: 73 IN | OXYGEN SATURATION: 99 %

## 2023-08-31 DIAGNOSIS — K52.9 CHRONIC DIARRHEA: Primary | ICD-10-CM

## 2023-08-31 DIAGNOSIS — R63.4 UNINTENDED WEIGHT LOSS: ICD-10-CM

## 2023-08-31 DIAGNOSIS — R10.84 GENERALIZED ABDOMINAL PAIN: ICD-10-CM

## 2023-08-31 PROCEDURE — 99214 OFFICE O/P EST MOD 30 MIN: CPT | Performed by: INTERNAL MEDICINE

## 2023-08-31 RX ORDER — BISACODYL 5 MG/1
10 TABLET, DELAYED RELEASE ORAL ONCE
Qty: 2 TABLET | Refills: 0 | Status: SHIPPED | OUTPATIENT
Start: 2023-08-31 | End: 2023-09-07

## 2023-08-31 NOTE — H&P (VIEW-ONLY)
Follow-up Note -  [facility]  Joshua Campbell 1955 male         Reason: Follow-up    HPI:  Mr. Salo Rueda  was seen in the office last year regarding bloating with diarrhea and had EGD and colonoscopy seen July 2022. He comes in with episodes of more diarrhea most of the time. Denies any blood or mucus in the stool. Complaining about decreased appetite and lost about 25 pounds. He just had a CT scan done which was unremarkable. Denies any heartburn or acid reflux. Denies any difficulty swallowing. Chaperon: Ms. Tere Mur[place]: Review of Systems   Constitutional: Negative for activity change, appetite change, chills, diaphoresis, fatigue, fever and unexpected weight change. HENT: Negative for ear discharge, ear pain, facial swelling, hearing loss, nosebleeds, sore throat, tinnitus and voice change. Eyes: Negative for pain, discharge, redness, itching and visual disturbance. Respiratory: Negative for apnea, cough, chest tightness, shortness of breath and wheezing. Cardiovascular: Negative for chest pain and palpitations. Gastrointestinal:        As noted in HPI   Endocrine: Negative for cold intolerance, heat intolerance and polyuria. Genitourinary: Negative for difficulty urinating, dysuria, flank pain, hematuria and urgency. Musculoskeletal: Negative for arthralgias, back pain, gait problem, joint swelling and myalgias. Skin: Negative for rash and wound. Neurological: Negative for dizziness, tremors, seizures, speech difficulty, light-headedness, numbness and headaches. Hematological: Negative for adenopathy. Does not bruise/bleed easily. Psychiatric/Behavioral: Negative for agitation, behavioral problems and confusion. The patient is not nervous/anxious.          Past Medical History:   Diagnosis Date   • Anxiety    • BPH with obstruction/lower urinary tract symptoms    • BPH with obstruction/lower urinary tract symptoms    • Colon polyp    • Disease of thyroid gland    • Hypogonadism in male    • Lightheadedness     last assessed-2015   • Nocturia    • Nocturia    • Partial small bowel obstruction (HCC)     last assessed-2016   • Shoulder injury, left, initial encounter     resolved:2017   • Sixth cranial nerve palsy     last assessed-2015   • Sleep apnea    • Testicular pain, left 2020      Past Surgical History:   Procedure Laterality Date   • COLONOSCOPY     • NASAL SINUS SURGERY Bilateral    • TONSILLECTOMY       Social History     Socioeconomic History   • Marital status: /Civil Union     Spouse name: Not on file   • Number of children: Not on file   • Years of education: Not on file   • Highest education level: Not on file   Occupational History   • Not on file   Tobacco Use   • Smoking status: Former     Types: Cigarettes     Quit date: 1988     Years since quittin.2   • Smokeless tobacco: Former     Types: Chew   Vaping Use   • Vaping Use: Former   Substance and Sexual Activity   • Alcohol use: Yes     Alcohol/week: 1.0 standard drink of alcohol     Types: 1 Cans of beer per week     Comment: social   • Drug use: No   • Sexual activity: Yes     Partners: Female     Comment: no comment   Other Topics Concern   • Not on file   Social History Narrative    Daily caffeine consumption     Social Determinants of Health     Financial Resource Strain: Low Risk  (10/19/2022)    Overall Financial Resource Strain (CARDIA)    • Difficulty of Paying Living Expenses: Not hard at all   Food Insecurity: Not on file   Transportation Needs: No Transportation Needs (10/19/2022)    PRAPARE - Transportation    • Lack of Transportation (Medical): No    • Lack of Transportation (Non-Medical):  No   Physical Activity: Not on file   Stress: Not on file   Social Connections: Not on file   Intimate Partner Violence: Not on file   Housing Stability: Not on file     Family History   Problem Relation Age of Onset   • Cancer Other    • Diabetes Family    • Other Other         back disorder   • Heart disease Brother      Codeine, Pine, and Pollen extract  Current Outpatient Medications   Medication Sig Dispense Refill   • ALPRAZolam (XANAX) 1 mg tablet Take 1 tablet (1 mg total) by mouth daily as needed for anxiety 15 tablet 3   • Azelaic Acid (Finacea) 15 % cream Use on face twice daily 50 g 2   • bisacodyl (DULCOLAX) 5 mg EC tablet Take 2 tablets (10 mg total) by mouth once for 1 dose 2 tablet 0   • clindamycin (CLINDAGEL) 1 % gel Apply topically 2 (two) times a day Apply to nose/back 60 g 2   • levothyroxine 25 mcg tablet Take 1 tablet (25 mcg total) by mouth daily 90 tablet 1   • polyethylene glycol (GOLYTELY) 4000 mL solution Take 4,000 mL by mouth once for 1 dose 4000 mL 0   • zolpidem (AMBIEN) 10 mg tablet Take 1 tablet (10 mg total) by mouth daily at bedtime as needed for sleep 30 tablet 3   • amoxicillin (AMOXIL) 500 mg capsule  (Patient not taking: Reported on 8/29/2023)     • ciprofloxacin-dexamethasone (CIPRODEX) otic suspension Administer 4 drops to the right ear 2 (two) times a day for 7 days (Patient not taking: Reported on 8/29/2023) 3 mL 0     Current Facility-Administered Medications   Medication Dose Route Frequency Provider Last Rate Last Admin   • cyanocobalamin injection 1,000 mcg  1,000 mcg Intramuscular Q30 Days Leyla Vogt MD   1,000 mcg at 02/15/23 1155       Blood pressure 122/68, pulse 60, height 6' 1" (1.854 m), weight 94.3 kg (208 lb), SpO2 99 %. PHYSICAL EXAM: Physical Exam  Constitutional:       Appearance: He is well-developed. HENT:      Head: Normocephalic and atraumatic. Eyes:      General: No scleral icterus. Right eye: No discharge. Left eye: No discharge. Conjunctiva/sclera: Conjunctivae normal.      Pupils: Pupils are equal, round, and reactive to light. Neck:      Thyroid: No thyromegaly. Vascular: No JVD. Trachea: No tracheal deviation.    Cardiovascular: Rate and Rhythm: Normal rate and regular rhythm. Heart sounds: Normal heart sounds. No murmur heard. No friction rub. No gallop. Pulmonary:      Effort: Pulmonary effort is normal. No respiratory distress. Breath sounds: Normal breath sounds. No wheezing or rales. Chest:      Chest wall: No tenderness. Abdominal:      General: Bowel sounds are normal. There is no distension. Palpations: Abdomen is soft. There is no mass. Tenderness: There is no abdominal tenderness. There is no guarding or rebound. Hernia: No hernia is present. Musculoskeletal:      Cervical back: Neck supple. Lymphadenopathy:      Cervical: No cervical adenopathy. Skin:     General: Skin is warm and dry. Findings: No erythema or rash. Neurological:      Mental Status: He is alert and oriented to person, place, and time. Psychiatric:         Behavior: Behavior normal.         Thought Content: Thought content normal.          Lab Results   Component Value Date    WBC 6.94 07/26/2023    HGB 15.3 07/26/2023    HCT 46.3 07/26/2023    MCV 91 07/26/2023     07/26/2023     Lab Results   Component Value Date    GLUCOSE 95 08/11/2015    CALCIUM 9.4 07/26/2023     12/07/2017    K 4.0 07/26/2023    CO2 29 07/26/2023     07/26/2023    BUN 11 07/26/2023    CREATININE 1.01 07/26/2023     Lab Results   Component Value Date    ALT 8 07/26/2023    AST 13 07/26/2023    ALKPHOS 63 07/26/2023    BILITOT 0.4 12/07/2017     Lab Results   Component Value Date    INR 0.94 08/11/2015    PROTIME 12.4 08/11/2015       CT abdomen pelvis w contrast    Result Date: 8/29/2023  Impression: 1. No acute abnormality identified in the abdomen or pelvis. 2. Diverticulosis without evidence for acute diverticulitis. 3. Nonobstructing 6 mm left upper pole renal calculus. 4. Additional findings as noted. Resident: Dejon Servin, the attending radiologist, have reviewed the images and agree with the final report above. Workstation performed: [id number]       ASSESSMENT & PLAN:    Chronic diarrhea  Possible functional secondary to IBS but should rule out any gluten sensitivity. Rule out microscopic colitis. Rule out pancreatic insufficiency. Rule out lactose intolerance.    -Check celiac disease panel    -Check stool for fecal fat and pancreatic elastase    -Advised to avoid milk and dairy products for few weeks to see the response    -Schedule for colonoscopy. -High-fiber diet.    -Patient was given instructions about the colonoscopy prep.    -Patient was explained about the risks and benefits of the procedure. Risks including but not limited to bleeding, infection, perforation were explained in detail. Also explained about less than 100% sensitivity with the exam and other alternatives.     Unintended weight loss    -Scheduled for EGD also along with colonoscopy

## 2023-08-31 NOTE — PROGRESS NOTES
Follow-up Note -  [facility]  Edgar Rollins 1955 male         Reason: Follow-up    HPI:  Mr. Wero Grewal  was seen in the office last year regarding bloating with diarrhea and had EGD and colonoscopy seen July 2022. He comes in with episodes of more diarrhea most of the time. Denies any blood or mucus in the stool. Complaining about decreased appetite and lost about 25 pounds. He just had a CT scan done which was unremarkable. Denies any heartburn or acid reflux. Denies any difficulty swallowing. Chaperon: Ms. Lainez Enrique: Review of Systems   Constitutional: Negative for activity change, appetite change, chills, diaphoresis, fatigue, fever and unexpected weight change. HENT: Negative for ear discharge, ear pain, facial swelling, hearing loss, nosebleeds, sore throat, tinnitus and voice change. Eyes: Negative for pain, discharge, redness, itching and visual disturbance. Respiratory: Negative for apnea, cough, chest tightness, shortness of breath and wheezing. Cardiovascular: Negative for chest pain and palpitations. Gastrointestinal:        As noted in HPI   Endocrine: Negative for cold intolerance, heat intolerance and polyuria. Genitourinary: Negative for difficulty urinating, dysuria, flank pain, hematuria and urgency. Musculoskeletal: Negative for arthralgias, back pain, gait problem, joint swelling and myalgias. Skin: Negative for rash and wound. Neurological: Negative for dizziness, tremors, seizures, speech difficulty, light-headedness, numbness and headaches. Hematological: Negative for adenopathy. Does not bruise/bleed easily. Psychiatric/Behavioral: Negative for agitation, behavioral problems and confusion. The patient is not nervous/anxious.          Past Medical History:   Diagnosis Date   • Anxiety    • BPH with obstruction/lower urinary tract symptoms    • BPH with obstruction/lower urinary tract symptoms    • Colon polyp    • Disease of thyroid gland    • Hypogonadism in male    • Lightheadedness     last assessed-2015   • Nocturia    • Nocturia    • Partial small bowel obstruction (HCC)     last assessed-2016   • Shoulder injury, left, initial encounter     resolved:2017   • Sixth cranial nerve palsy     last assessed-2015   • Sleep apnea    • Testicular pain, left 2020      Past Surgical History:   Procedure Laterality Date   • COLONOSCOPY     • NASAL SINUS SURGERY Bilateral    • TONSILLECTOMY       Social History     Socioeconomic History   • Marital status: /Civil Union     Spouse name: Not on file   • Number of children: Not on file   • Years of education: Not on file   • Highest education level: Not on file   Occupational History   • Not on file   Tobacco Use   • Smoking status: Former     Types: Cigarettes     Quit date: 1988     Years since quittin.2   • Smokeless tobacco: Former     Types: Chew   Vaping Use   • Vaping Use: Former   Substance and Sexual Activity   • Alcohol use: Yes     Alcohol/week: 1.0 standard drink of alcohol     Types: 1 Cans of beer per week     Comment: social   • Drug use: No   • Sexual activity: Yes     Partners: Female     Comment: no comment   Other Topics Concern   • Not on file   Social History Narrative    Daily caffeine consumption     Social Determinants of Health     Financial Resource Strain: Low Risk  (10/19/2022)    Overall Financial Resource Strain (CARDIA)    • Difficulty of Paying Living Expenses: Not hard at all   Food Insecurity: Not on file   Transportation Needs: No Transportation Needs (10/19/2022)    PRAPARE - Transportation    • Lack of Transportation (Medical): No    • Lack of Transportation (Non-Medical):  No   Physical Activity: Not on file   Stress: Not on file   Social Connections: Not on file   Intimate Partner Violence: Not on file   Housing Stability: Not on file     Family History   Problem Relation Age of Onset   • Cancer Other    • Diabetes Family    • Other Other         back disorder   • Heart disease Brother      Codeine, Pine, and Pollen extract  Current Outpatient Medications   Medication Sig Dispense Refill   • ALPRAZolam (XANAX) 1 mg tablet Take 1 tablet (1 mg total) by mouth daily as needed for anxiety 15 tablet 3   • Azelaic Acid (Finacea) 15 % cream Use on face twice daily 50 g 2   • bisacodyl (DULCOLAX) 5 mg EC tablet Take 2 tablets (10 mg total) by mouth once for 1 dose 2 tablet 0   • clindamycin (CLINDAGEL) 1 % gel Apply topically 2 (two) times a day Apply to nose/back 60 g 2   • levothyroxine 25 mcg tablet Take 1 tablet (25 mcg total) by mouth daily 90 tablet 1   • polyethylene glycol (GOLYTELY) 4000 mL solution Take 4,000 mL by mouth once for 1 dose 4000 mL 0   • zolpidem (AMBIEN) 10 mg tablet Take 1 tablet (10 mg total) by mouth daily at bedtime as needed for sleep 30 tablet 3   • amoxicillin (AMOXIL) 500 mg capsule  (Patient not taking: Reported on 8/29/2023)     • ciprofloxacin-dexamethasone (CIPRODEX) otic suspension Administer 4 drops to the right ear 2 (two) times a day for 7 days (Patient not taking: Reported on 8/29/2023) 3 mL 0     Current Facility-Administered Medications   Medication Dose Route Frequency Provider Last Rate Last Admin   • cyanocobalamin injection 1,000 mcg  1,000 mcg Intramuscular Q30 Days Leyla Jerome MD   1,000 mcg at 02/15/23 1155       Blood pressure 122/68, pulse 60, height 6' 1" (1.854 m), weight 94.3 kg (208 lb), SpO2 99 %. PHYSICAL EXAM: Physical Exam  Constitutional:       Appearance: He is well-developed. HENT:      Head: Normocephalic and atraumatic. Eyes:      General: No scleral icterus. Right eye: No discharge. Left eye: No discharge. Conjunctiva/sclera: Conjunctivae normal.      Pupils: Pupils are equal, round, and reactive to light. Neck:      Thyroid: No thyromegaly. Vascular: No JVD. Trachea: No tracheal deviation.    Cardiovascular: Rate and Rhythm: Normal rate and regular rhythm. Heart sounds: Normal heart sounds. No murmur heard. No friction rub. No gallop. Pulmonary:      Effort: Pulmonary effort is normal. No respiratory distress. Breath sounds: Normal breath sounds. No wheezing or rales. Chest:      Chest wall: No tenderness. Abdominal:      General: Bowel sounds are normal. There is no distension. Palpations: Abdomen is soft. There is no mass. Tenderness: There is no abdominal tenderness. There is no guarding or rebound. Hernia: No hernia is present. Musculoskeletal:      Cervical back: Neck supple. Lymphadenopathy:      Cervical: No cervical adenopathy. Skin:     General: Skin is warm and dry. Findings: No erythema or rash. Neurological:      Mental Status: He is alert and oriented to person, place, and time. Psychiatric:         Behavior: Behavior normal.         Thought Content: Thought content normal.          Lab Results   Component Value Date    WBC 6.94 07/26/2023    HGB 15.3 07/26/2023    HCT 46.3 07/26/2023    MCV 91 07/26/2023     07/26/2023     Lab Results   Component Value Date    GLUCOSE 95 08/11/2015    CALCIUM 9.4 07/26/2023     12/07/2017    K 4.0 07/26/2023    CO2 29 07/26/2023     07/26/2023    BUN 11 07/26/2023    CREATININE 1.01 07/26/2023     Lab Results   Component Value Date    ALT 8 07/26/2023    AST 13 07/26/2023    ALKPHOS 63 07/26/2023    BILITOT 0.4 12/07/2017     Lab Results   Component Value Date    INR 0.94 08/11/2015    PROTIME 12.4 08/11/2015       CT abdomen pelvis w contrast    Result Date: 8/29/2023  Impression: 1. No acute abnormality identified in the abdomen or pelvis. 2. Diverticulosis without evidence for acute diverticulitis. 3. Nonobstructing 6 mm left upper pole renal calculus. 4. Additional findings as noted. Resident: Letha Diaz, the attending radiologist, have reviewed the images and agree with the final report above. Workstation performed: [id number]       ASSESSMENT & PLAN:    Chronic diarrhea  Possible functional secondary to IBS but should rule out any gluten sensitivity. Rule out microscopic colitis. Rule out pancreatic insufficiency. Rule out lactose intolerance.    -Check celiac disease panel    -Check stool for fecal fat and pancreatic elastase    -Advised to avoid milk and dairy products for few weeks to see the response    -Schedule for colonoscopy. -High-fiber diet.    -Patient was given instructions about the colonoscopy prep.    -Patient was explained about the risks and benefits of the procedure. Risks including but not limited to bleeding, infection, perforation were explained in detail. Also explained about less than 100% sensitivity with the exam and other alternatives.     Unintended weight loss    -Scheduled for EGD also along with colonoscopy

## 2023-08-31 NOTE — ASSESSMENT & PLAN NOTE
Possible functional secondary to IBS but should rule out any gluten sensitivity. Rule out microscopic colitis. Rule out pancreatic insufficiency. Rule out lactose intolerance.    -Check celiac disease panel    -Check stool for fecal fat and pancreatic elastase    -Advised to avoid milk and dairy products for few weeks to see the response    -Schedule for colonoscopy. -High-fiber diet.    -Patient was given instructions about the colonoscopy prep.    -Patient was explained about the risks and benefits of the procedure. Risks including but not limited to bleeding, infection, perforation were explained in detail. Also explained about less than 100% sensitivity with the exam and other alternatives.

## 2023-09-01 ENCOUNTER — APPOINTMENT (OUTPATIENT)
Dept: LAB | Facility: CLINIC | Age: 68
End: 2023-09-01
Payer: MEDICARE

## 2023-09-01 DIAGNOSIS — K52.9 CHRONIC DIARRHEA: ICD-10-CM

## 2023-09-01 PROCEDURE — 82653 EL-1 FECAL QUANTITATIVE: CPT

## 2023-09-01 PROCEDURE — 82784 ASSAY IGA/IGD/IGG/IGM EACH: CPT

## 2023-09-01 PROCEDURE — 36415 COLL VENOUS BLD VENIPUNCTURE: CPT

## 2023-09-01 PROCEDURE — 86258 DGP ANTIBODY EACH IG CLASS: CPT

## 2023-09-01 PROCEDURE — 86364 TISS TRNSGLTMNASE EA IG CLAS: CPT

## 2023-09-01 PROCEDURE — 82705 FATS/LIPIDS FECES QUAL: CPT

## 2023-09-01 PROCEDURE — 86231 EMA EACH IG CLASS: CPT

## 2023-09-05 LAB
ENDOMYSIUM IGA SER QL: NEGATIVE
FAT STL QL: NORMAL
GLIADIN PEPTIDE IGA SER-ACNC: 9 UNITS (ref 0–19)
GLIADIN PEPTIDE IGG SER-ACNC: 1 UNITS (ref 0–19)
IGA SERPL-MCNC: 180 MG/DL (ref 61–437)
NEUTRAL FAT STL QL: NORMAL
TTG IGA SER-ACNC: <2 U/ML (ref 0–3)
TTG IGG SER-ACNC: <2 U/ML (ref 0–5)

## 2023-09-06 LAB — ELASTASE PANC STL-MCNT: >500 UG ELAST./G

## 2023-09-07 ENCOUNTER — ANESTHESIA EVENT (OUTPATIENT)
Dept: GASTROENTEROLOGY | Facility: AMBULATORY SURGERY CENTER | Age: 68
End: 2023-09-07

## 2023-09-07 ENCOUNTER — HOSPITAL ENCOUNTER (OUTPATIENT)
Dept: GASTROENTEROLOGY | Facility: AMBULATORY SURGERY CENTER | Age: 68
Discharge: HOME | End: 2023-09-07
Attending: INTERNAL MEDICINE

## 2023-09-07 ENCOUNTER — ANESTHESIA (OUTPATIENT)
Dept: GASTROENTEROLOGY | Facility: AMBULATORY SURGERY CENTER | Age: 68
End: 2023-09-07

## 2023-09-07 VITALS
HEIGHT: 73 IN | BODY MASS INDEX: 26.64 KG/M2 | OXYGEN SATURATION: 97 % | DIASTOLIC BLOOD PRESSURE: 65 MMHG | SYSTOLIC BLOOD PRESSURE: 118 MMHG | RESPIRATION RATE: 18 BRPM | HEART RATE: 69 BPM | WEIGHT: 201 LBS | TEMPERATURE: 97.6 F

## 2023-09-07 DIAGNOSIS — R63.4 UNINTENDED WEIGHT LOSS: ICD-10-CM

## 2023-09-07 DIAGNOSIS — K52.9 CHRONIC DIARRHEA: ICD-10-CM

## 2023-09-07 DIAGNOSIS — R10.84 GENERALIZED ABDOMINAL PAIN: ICD-10-CM

## 2023-09-07 PROCEDURE — 88305 TISSUE EXAM BY PATHOLOGIST: CPT | Performed by: STUDENT IN AN ORGANIZED HEALTH CARE EDUCATION/TRAINING PROGRAM

## 2023-09-07 RX ORDER — SODIUM CHLORIDE, SODIUM LACTATE, POTASSIUM CHLORIDE, CALCIUM CHLORIDE 600; 310; 30; 20 MG/100ML; MG/100ML; MG/100ML; MG/100ML
20 INJECTION, SOLUTION INTRAVENOUS CONTINUOUS
Status: DISCONTINUED | OUTPATIENT
Start: 2023-09-07 | End: 2023-09-11 | Stop reason: HOSPADM

## 2023-09-07 RX ORDER — PROPOFOL 10 MG/ML
INJECTION, EMULSION INTRAVENOUS AS NEEDED
Status: DISCONTINUED | OUTPATIENT
Start: 2023-09-07 | End: 2023-09-07

## 2023-09-07 RX ORDER — SODIUM CHLORIDE, SODIUM LACTATE, POTASSIUM CHLORIDE, CALCIUM CHLORIDE 600; 310; 30; 20 MG/100ML; MG/100ML; MG/100ML; MG/100ML
125 INJECTION, SOLUTION INTRAVENOUS CONTINUOUS
Status: DISCONTINUED | OUTPATIENT
Start: 2023-09-07 | End: 2023-09-11 | Stop reason: HOSPADM

## 2023-09-07 RX ORDER — CHOLESTYRAMINE 4 G/9G
1 POWDER, FOR SUSPENSION ORAL 3 TIMES DAILY PRN
Qty: 60 PACKET | Refills: 2 | Status: SHIPPED | OUTPATIENT
Start: 2023-09-07

## 2023-09-07 RX ADMIN — PROPOFOL 100 MG: 10 INJECTION, EMULSION INTRAVENOUS at 08:38

## 2023-09-07 RX ADMIN — PROPOFOL 100 MG: 10 INJECTION, EMULSION INTRAVENOUS at 08:42

## 2023-09-07 RX ADMIN — PROPOFOL 100 MG: 10 INJECTION, EMULSION INTRAVENOUS at 08:46

## 2023-09-07 RX ADMIN — SODIUM CHLORIDE, SODIUM LACTATE, POTASSIUM CHLORIDE, CALCIUM CHLORIDE: 600; 310; 30; 20 INJECTION, SOLUTION INTRAVENOUS at 08:16

## 2023-09-07 RX ADMIN — SODIUM CHLORIDE, SODIUM LACTATE, POTASSIUM CHLORIDE, CALCIUM CHLORIDE: 600; 310; 30; 20 INJECTION, SOLUTION INTRAVENOUS at 08:59

## 2023-09-07 RX ADMIN — PROPOFOL 100 MG: 10 INJECTION, EMULSION INTRAVENOUS at 08:55

## 2023-09-07 NOTE — ANESTHESIA PREPROCEDURE EVALUATION
Procedure:  EGD  COLONOSCOPY    Relevant Problems   CARDIO   (+) Hyperlipidemia   (+) Hypertriglyceridemia      ENDO   (+) Hypothyroidism      /RENAL   (+) Acute renal failure (HCC)   (+) BPH with obstruction/lower urinary tract symptoms   (+) Stage 3a chronic kidney disease (HCC)      MUSCULOSKELETAL   (+) Chronic left-sided low back pain   (+) Spondylosis of cervical region without myelopathy or radiculopathy      NEURO/PSYCH   (+) Anxiety   (+) Chronic left-sided low back pain   (+) Weakness of left leg      PULMONARY   (+) Obstructive sleep apnea        Physical Exam    Airway    Mallampati score: II  TM Distance: >3 FB  Neck ROM: full     Dental   No notable dental hx     Cardiovascular      Pulmonary      Other Findings        Anesthesia Plan  ASA Score- 2     Anesthesia Type- IV sedation with anesthesia with ASA Monitors. Additional Monitors:   Airway Plan:           Plan Factors-Exercise tolerance (METS): >4 METS. Chart reviewed. Patient summary reviewed. Patient is not a current smoker. Obstructive sleep apnea risk education given perioperatively. Induction- intravenous. Postoperative Plan-     Informed Consent- Anesthetic plan and risks discussed with patient. I personally reviewed this patient with the CRNA. Discussed and agreed on the Anesthesia Plan with the CRNA. Jena Holguin

## 2023-09-07 NOTE — ANESTHESIA POSTPROCEDURE EVALUATION
Post-Op Assessment Note    CV Status:  Stable  Pain Score: 0    Pain management: adequate     Mental Status:  Alert and awake   Hydration Status:  Euvolemic   PONV Controlled:  Controlled   Airway Patency:  Patent      Post Op Vitals Reviewed: Yes      Staff: CRNA         No notable events documented.     BP   96/65   Temp   98   Pulse  74   Resp   16   SpO2   99

## 2023-09-07 NOTE — INTERVAL H&P NOTE
H&P reviewed. After examining the patient I find no changes in the patients condition since the H&P had been written.     Vitals:    09/07/23 0824   BP: 131/70   Pulse: 98   Resp: 18   Temp: 97.6 °F (36.4 °C)   SpO2: 97%

## 2023-09-13 PROCEDURE — 88305 TISSUE EXAM BY PATHOLOGIST: CPT | Performed by: STUDENT IN AN ORGANIZED HEALTH CARE EDUCATION/TRAINING PROGRAM

## 2023-09-14 DIAGNOSIS — K52.832 LYMPHOCYTIC COLITIS: Primary | ICD-10-CM

## 2023-09-14 RX ORDER — BUDESONIDE 3 MG/1
CAPSULE, COATED PELLETS ORAL
Qty: 126 CAPSULE | Refills: 1 | Status: SHIPPED | OUTPATIENT
Start: 2023-09-14

## 2023-09-14 NOTE — RESULT ENCOUNTER NOTE
I tried to call the patient several times and sent to voice mail.   I left him a message describing his diagnosis of Lymphocytic colitis and sent the Budesonide taper to his pharmacy (9mg daily for 6 weeks, 6mg daily for 1 week, 3mg daily for 1 week)  I told him to call back if he had questions

## 2023-11-08 ENCOUNTER — IMMUNIZATIONS (OUTPATIENT)
Dept: FAMILY MEDICINE CLINIC | Facility: CLINIC | Age: 68
End: 2023-11-08
Payer: MEDICARE

## 2023-11-08 DIAGNOSIS — Z23 ENCOUNTER FOR IMMUNIZATION: Primary | ICD-10-CM

## 2023-11-08 PROCEDURE — G0008 ADMIN INFLUENZA VIRUS VAC: HCPCS

## 2023-11-08 PROCEDURE — 90662 IIV NO PRSV INCREASED AG IM: CPT

## 2023-12-05 ENCOUNTER — RA CDI HCC (OUTPATIENT)
Dept: OTHER | Facility: HOSPITAL | Age: 68
End: 2023-12-05

## 2023-12-12 ENCOUNTER — OFFICE VISIT (OUTPATIENT)
Dept: FAMILY MEDICINE CLINIC | Facility: CLINIC | Age: 68
End: 2023-12-12
Payer: MEDICARE

## 2023-12-12 VITALS
BODY MASS INDEX: 26.51 KG/M2 | OXYGEN SATURATION: 98 % | TEMPERATURE: 98.1 F | DIASTOLIC BLOOD PRESSURE: 68 MMHG | RESPIRATION RATE: 16 BRPM | WEIGHT: 200 LBS | SYSTOLIC BLOOD PRESSURE: 120 MMHG | HEART RATE: 100 BPM | HEIGHT: 73 IN

## 2023-12-12 DIAGNOSIS — Z12.5 PROSTATE CANCER SCREENING: ICD-10-CM

## 2023-12-12 DIAGNOSIS — E53.8 CYANOCOBALAMIN DEFICIENCY: ICD-10-CM

## 2023-12-12 DIAGNOSIS — E78.1 HYPERTRIGLYCERIDEMIA: ICD-10-CM

## 2023-12-12 DIAGNOSIS — E78.2 MIXED HYPERLIPIDEMIA: ICD-10-CM

## 2023-12-12 DIAGNOSIS — N18.31 STAGE 3A CHRONIC KIDNEY DISEASE (HCC): ICD-10-CM

## 2023-12-12 DIAGNOSIS — Z00.00 MEDICARE ANNUAL WELLNESS VISIT, SUBSEQUENT: Primary | ICD-10-CM

## 2023-12-12 PROCEDURE — G0439 PPPS, SUBSEQ VISIT: HCPCS | Performed by: NURSE PRACTITIONER

## 2023-12-12 NOTE — PROGRESS NOTES
Assessment and Plan:     Problem List Items Addressed This Visit          Genitourinary    Stage 3a chronic kidney disease ([address])    Relevant Orders    CBC and differential    Comprehensive metabolic panel       Other    Hyperlipidemia    Relevant Orders    Lipid panel    Comprehensive metabolic panel    Hypertriglyceridemia    Relevant Orders    Comprehensive metabolic panel    Cyanocobalamin deficiency    Relevant Orders    Vitamin B12     Other Visit Diagnoses       Medicare annual wellness visit, subsequent    -  Primary    Prostate cancer screening        Relevant Orders    PSA, Total Screen            Depression Screening and Follow-up Plan: Patient was screened for depression during today's encounter. They screened negative with a PHQ-2 score of 0. Preventive health issues were discussed with patient, and age appropriate screening tests were ordered as noted in patient's After Visit Summary. Personalized health advice and appropriate referrals for health education or preventive services given if needed, as noted in patient's After Visit Summary.      History of Present Illness:     Patient presents for a Medicare Wellness Visit    HPI   Patient Care Team:  Santo Del Rosario as PCP - General (Family Medicine)  MD Bekah Yuan MD Ruffin Hair, DO (Pain Medicine)  Hang Em PA-C     Review of Systems:     Review of Systems     Problem List:     Patient Active Problem List   Diagnosis    Anxiety    Chronic left-sided low back pain    Spondylosis of cervical region without myelopathy or radiculopathy    Weakness of left leg    Radiculopathy, lumbar region    Hyperlipidemia    Enlarged prostate without lower urinary tract symptoms (luts)    Cervical radiculitis    Obstructive sleep apnea    Insomnia    Hypothyroidism    Rosacea    Lower abdominal pain    Excessive flatus    Other constipation    Acute renal failure (HCC)    Stage 3a chronic kidney disease ([address])    Change in bowel habits    Bloating    History of colon polyps    BPH with obstruction/lower urinary tract symptoms    Acne vulgaris    History of tobacco use    History of alcohol use    Hypertriglyceridemia    Pyridoxine deficiency    Ascorbic acid deficiency    Vitamin D deficiency    Cyanocobalamin deficiency    Unintended weight loss    Chronic diarrhea    Generalized abdominal pain    Lymphocytic colitis      Past Medical and Surgical History:     Past Medical History:   Diagnosis Date    Abdominal pain     Anxiety     BPH with obstruction/lower urinary tract symptoms     BPH with obstruction/lower urinary tract symptoms     Chronic diarrhea     Colon polyp     Disease of thyroid gland     Hypogonadism in male     Lightheadedness     last assessed-2015    Nocturia     Nocturia     Partial small bowel obstruction (HCC)     last assessed-2016    Shoulder injury, left, initial encounter     resolved:2017    Sixth cranial nerve palsy     last assessed-2015    Sleep apnea     Testicular pain, left 2020    Weight loss      Past Surgical History:   Procedure Laterality Date    COLONOSCOPY      NASAL SINUS SURGERY Bilateral     TONSILLECTOMY        Family History:     Family History   Problem Relation Age of Onset    Cancer Other     Diabetes Family     Other Other         back disorder    Heart disease Brother       Social History:     Social History     Socioeconomic History    Marital status: /Civil Union     Spouse name: None    Number of children: None    Years of education: None    Highest education level: None   Occupational History    None   Tobacco Use    Smoking status: Former     Types: Cigarettes     Quit date: 1988     Years since quittin.5    Smokeless tobacco: Former     Types: Chew   Vaping Use    Vaping Use: Former   Substance and Sexual Activity    Alcohol use:  Yes     Alcohol/week: 1.0 standard drink of alcohol     Types: 1 Cans of beer per week     Comment: social    Drug use: No    Sexual activity: Yes     Partners: Female     Comment: no comment   Other Topics Concern    None   Social History Narrative    Daily caffeine consumption     Social Determinants of Health     Financial Resource Strain: Low Risk  (12/12/2023)    Overall Financial Resource Strain (CARDIA)     Difficulty of Paying Living Expenses: Not hard at all   Food Insecurity: Not on file   Transportation Needs: No Transportation Needs (12/12/2023)    PRAPARE - Transportation     Lack of Transportation (Medical): No     Lack of Transportation (Non-Medical):  No   Physical Activity: Not on file   Stress: Not on file   Social Connections: Not on file   Intimate Partner Violence: Not on file   Housing Stability: Not on file      Medications and Allergies:     Current Outpatient Medications   Medication Sig Dispense Refill    ALPRAZolam (XANAX) 1 mg tablet Take 1 tablet (1 mg total) by mouth daily as needed for anxiety 15 tablet 3    Azelaic Acid (Finacea) 15 % cream Use on face twice daily 50 g 2    budesonide (ENTOCORT EC) 3 MG capsule Take three 3mg tablets (for a total dose of 9mg daily) once daily for 6 weeks, then take two 3mg tablets (for a total dose of 6mg daily) once daily for 1 week, then take one 3mg tablet daily for 1 week 126 capsule 1    cholestyramine (QUESTRAN) 4 g packet Take 1 packet (4 g total) by mouth 3 (three) times a day as needed (DIARRHEA) Please take other medication at least 3 hours apart from Questran 60 packet 2    clindamycin (CLINDAGEL) 1 % gel Apply topically 2 (two) times a day Apply to nose/back 60 g 2    levothyroxine 25 mcg tablet Take 1 tablet (25 mcg total) by mouth daily 90 tablet 1    zolpidem (AMBIEN) 10 mg tablet Take 1 tablet (10 mg total) by mouth daily at bedtime as needed for sleep 30 tablet 3    amoxicillin (AMOXIL) 500 mg capsule  (Patient not taking: Reported on 8/29/2023)      ciprofloxacin-dexamethasone (CIPRODEX) otic suspension Administer 4 drops to the right ear 2 (two) times a day for 7 days (Patient not taking: Reported on 8/29/2023) 3 mL 0     Current Facility-Administered Medications   Medication Dose Route Frequency Provider Last Rate Last Admin    cyanocobalamin injection 1,000 mcg  1,000 mcg Intramuscular Q30 Days Leyla Frederick MD   1,000 mcg at 02/15/23 1155     Allergies   Allergen Reactions    Codeine GI Intolerance    Pine Allergic Rhinitis    Pollen Extract Allergic Rhinitis      Immunizations:     Immunization History   Administered Date(s) Administered    COVID-19 PFIZER VACCINE 0.3 ML IM 04/02/2021, 04/23/2021, 11/05/2021    COVID-19 Pfizer Vac BIVALENT Jas-sucrose 12 Yr+ IM 11/26/2022    Influenza Quadrivalent Preservative Free 3 years and older IM 12/17/2015, 10/20/2016, 10/19/2017    Influenza, high dose seasonal 0.7 mL 11/02/2021, 10/19/2022, 11/08/2023    Influenza, recombinant, quadrivalent,injectable, preservative free 10/18/2018, 09/05/2019, 09/09/2020    Pneumococcal Conjugate 13-Valent 09/03/2021    Pneumococcal Conjugate Vaccine 20-valent (Pcv20), Polysace 10/19/2022    Tdap 09/01/2014    Zoster 09/01/2014    Zoster Vaccine Recombinant 02/01/2019, 04/17/2019      Health Maintenance:         Topic Date Due    Colorectal Cancer Screening  09/05/2028    Hepatitis C Screening  Completed         Topic Date Due    COVID-19 Vaccine (5 - Pfizer series) 03/26/2023      Medicare Screening Tests and Risk Assessments:     Salo Rueda is here for his Subsequent Wellness visit. Last Medicare Wellness visit information reviewed, patient interviewed, no change since last AWV. Health Risk Assessment:   Patient rates overall health as very good. Patient feels that their physical health rating is same. Patient is very satisfied with their life. Eyesight was rated as same. Hearing was rated as same. Patient feels that their emotional and mental health rating is same. Patients states they are never, rarely angry. Patient states they are sometimes unusually tired/fatigued. Pain experienced in the last 7 days has been some. Patient's pain rating has been 4/10. Patient states that he has experienced no weight loss or gain in last 6 months. Depression Screening:   PHQ-2 Score: 0      Fall Risk Screening: In the past year, patient has experienced: no history of falling in past year      Home Safety:  Patient does not have trouble with stairs inside or outside of their home. Patient has working smoke alarms and has working carbon monoxide detector. Home safety hazards include: none. Nutrition:   Current diet is Regular. Medications:   Patient is not currently taking any over-the-counter supplements. Patient is able to manage medications. Activities of Daily Living (ADLs)/Instrumental Activities of Daily Living (IADLs):   Walk and transfer into and out of bed and chair?: Yes  Dress and groom yourself?: Yes    Bathe or shower yourself?: Yes    Feed yourself?  Yes  Do your laundry/housekeeping?: Yes  Manage your money, pay your bills and track your expenses?: Yes  Make your own meals?: Yes    Do your own shopping?: Yes    Previous Hospitalizations:   Any hospitalizations or ED visits within the last 12 months?: No      Advance Care Planning:   Living will: No    Durable POA for healthcare: No    Advanced directive: No    Advanced directive counseling given: Yes    ACP document given: Yes      Cognitive Screening:   Provider or family/friend/caregiver concerned regarding cognition?: No    PREVENTIVE SCREENINGS      Cardiovascular Screening:    General: History Lipid Disorder and Risks and Benefits Discussed    Due for: Lipid Panel      Diabetes Screening:     General: Screening Current      Colorectal Cancer Screening:     General: Screening Current      Prostate Cancer Screening:    General: Risks and Benefits Discussed    Due for: PSA      Osteoporosis Screening:    General: Screening Not Indicated      Abdominal Aortic Aneurysm (AAA) Screening:    Risk factors include: age between 70-75 yo and tobacco use        General: Screening Not Indicated      Lung Cancer Screening:     General: Screening Not Indicated      Hepatitis C Screening:    General: Screening Current    Screening, Brief Intervention, and Referral to Treatment (SBIRT)    Screening  Typical number of drinks in a day: 0  Typical number of drinks in a week: 0  Interpretation: Low risk drinking behavior. Single Item Drug Screening:  How often have you used an illegal drug (including marijuana) or a prescription medication for non-medical reasons in the past year? never    Single Item Drug Screen Score: 0  Interpretation: Negative screen for possible drug use disorder    Brief Intervention  Alcohol & drug use screenings were reviewed. No concerns regarding substance use disorder identified. No results found.      Physical Exam:     /68 (BP Location: Left arm, Patient Position: Sitting, Cuff Size: Standard)   Pulse 100   Temp 98.1 °F (36.7 °C) (Temporal)   Resp 16   Ht 6' 1" (1.854 m)   Wt 90.7 kg (200 lb)   SpO2 98%   BMI 26.39 kg/m²     Physical Exam     [address], [facility]

## 2023-12-12 NOTE — PATIENT INSTRUCTIONS
Medicare Preventive Visit Patient Instructions  Thank you for completing your Welcome to Medicare Visit or Medicare Annual Wellness Visit today. Your next wellness visit will be due in one year (12/12/2024). The screening/preventive services that you may require over the next 5-10 years are detailed below. Some tests may not apply to you based off risk factors and/or age. Screening tests ordered at today's visit but not completed yet may show as past due. Also, please note that scanned in results may not display below. Preventive Screenings:  Service Recommendations Previous Testing/Comments   Colorectal Cancer Screening  Colonoscopy    Fecal Occult Blood Test (FOBT)/Fecal Immunochemical Test (FIT)  Fecal DNA/Cologuard Test  Flexible Sigmoidoscopy Age: 43-73 years old   Colonoscopy: every 10 years (May be performed more frequently if at higher risk)  OR  FOBT/FIT: every 1 year  OR  Cologuard: every 3 years  OR  Sigmoidoscopy: every 5 years  Screening may be recommended earlier than age 39 if at higher risk for colorectal cancer. Also, an individualized decision between you and your healthcare provider will decide whether screening between the ages of 77-80 would be appropriate.  Colonoscopy: 09/07/2023  FOBT/FIT: Not on file  Cologuard: Not on file  Sigmoidoscopy: Not on file    Screening Current     Prostate Cancer Screening Individualized decision between patient and health care provider in men between ages of 53-66   Medicare will cover every 12 months beginning on the day after your 50th birthday PSA: 1.5 ng/mL           Hepatitis C Screening Once for adults born between 1945 and 1965  More frequently in patients at high risk for Hepatitis C Hep C Antibody: 01/03/2019    Screening Current   Diabetes Screening 1-2 times per year if you're at risk for diabetes or have pre-diabetes Fasting glucose: 92 mg/dL (1/20/2023)  A1C: 5.2 % (7/26/2023)  Screening Current   Cholesterol Screening Once every 5 years if you don't have a lipid disorder. May order more often based on risk factors. Lipid panel: 08/25/2022  Screening Not Indicated  History Lipid Disorder      Other Preventive Screenings Covered by Medicare:  Abdominal Aortic Aneurysm (AAA) Screening: covered once if your at risk. You're considered to be at risk if you have a family history of AAA or a male between the age of 70-76 who smoking at least 100 cigarettes in your lifetime. Lung Cancer Screening: covers low dose CT scan once per year if you meet all of the following conditions: (1) Age 48-67; (2) No signs or symptoms of lung cancer; (3) Current smoker or have quit smoking within the last 15 years; (4) You have a tobacco smoking history of at least 20 pack years (packs per day x number of years you smoked); (5) You get a written order from a healthcare provider. Glaucoma Screening: covered annually if you're considered high risk: (1) You have diabetes OR (2) Family history of glaucoma OR (3)  aged 48 and older OR (3)  American aged 72 and older  Osteoporosis Screening: covered every 2 years if you meet one of the following conditions: (1) Have a vertebral abnormality; (2) On glucocorticoid therapy for more than 3 months; (3) Have primary hyperparathyroidism; (4) On osteoporosis medications and need to assess response to drug therapy. HIV Screening: covered annually if you're between the age of 14-79. Also covered annually if you are younger than 13 and older than 72 with risk factors for HIV infection. For pregnant patients, it is covered up to 3 times per pregnancy.     Immunizations:  Immunization Recommendations   Influenza Vaccine Annual influenza vaccination during flu season is recommended for all persons aged >= 6 months who do not have contraindications   Pneumococcal Vaccine   * Pneumococcal conjugate vaccine = PCV13 (Prevnar 13), PCV15 (Vaxneuvance), PCV20 (Prevnar 20)  * Pneumococcal polysaccharide vaccine = PPSV23 (Pneumovax) Adults 28-24 yo with certain risk factors or if 69+ yo  If never received any pneumonia vaccine: recommend Prevnar 20 (PCV20)  Give PCV20 if previously received 1 dose of PCV13 or PPSV23   Hepatitis B Vaccine 3 dose series if at intermediate or high risk (ex: diabetes, end stage renal disease, liver disease)   Respiratory syncytial virus (RSV) Vaccine - COVERED BY MEDICARE PART D  * RSVPreF3 (Arexvy) CDC recommends that adults 61years of age and older may receive a single dose of RSV vaccine using shared clinical decision-making (SCDM)   Tetanus (Td) Vaccine - COST NOT COVERED BY MEDICARE PART B Following completion of primary series, a booster dose should be given every 10 years to maintain immunity against tetanus. Td may also be given as tetanus wound prophylaxis. Tdap Vaccine - COST NOT COVERED BY MEDICARE PART B Recommended at least once for all adults. For pregnant patients, recommended with each pregnancy. Shingles Vaccine (Shingrix) - COST NOT COVERED BY MEDICARE PART B  2 shot series recommended in those 19 years and older who have or will have weakened immune systems or those 50 years and older     Health Maintenance Due:      Topic Date Due   • Colorectal Cancer Screening  09/05/2028   • Hepatitis C Screening  Completed     Immunizations Due:      Topic Date Due   • COVID-19 Vaccine (5 - Pfizer series) 03/26/2023     Advance Directives   What are advance directives? Advance directives are legal documents that state your wishes and plans for medical care. These plans are made ahead of time in case you lose your ability to make decisions for yourself. Advance directives can apply to any medical decision, such as the treatments you want, and if you want to donate organs. What are the types of advance directives? There are many types of advance directives, and each state has rules about how to use them. You may choose a combination of any of the following:  Living will:   This is a written record of the treatment you want. You can also choose which treatments you do not want, which to limit, and which to stop at a certain time. This includes surgery, medicine, IV fluid, and tube feedings. Durable power of  for [facility]): This is a written record that states who you want to make healthcare choices for you when you are unable to make them for yourself. This person, called a proxy, is usually a family member or a friend. You may choose more than 1 proxy. Do not resuscitate (DNR) order:  A DNR order is used in case your heart stops beating or you stop breathing. It is a request not to have certain forms of treatment, such as CPR. A DNR order may be included in other types of advance directives. Medical directive: This covers the care that you want if you are in a coma, near death, or unable to make decisions for yourself. You can list the treatments you want for each condition. Treatment may include pain medicine, surgery, blood transfusions, dialysis, IV or tube feedings, and a ventilator (breathing machine). Values history: This document has questions about your views, beliefs, and how you feel and think about life. This information can help others choose the care that you would choose. Why are advance directives important? An advance directive helps you control your care. Although spoken wishes may be used, it is better to have your wishes written down. Spoken wishes can be misunderstood, or not followed. Treatments may be given even if you do not want them. An advance directive may make it easier for your family to make difficult choices about your care. Weight Management   Why it is important to manage your weight:  Being overweight increases your risk of health conditions such as heart disease, high blood pressure, type 2 diabetes, and certain types of cancer. It can also increase your risk for osteoarthritis, sleep apnea, and other respiratory problems.  Aim for a slow, steady weight loss. Even a small amount of weight loss can lower your risk of health problems. How to lose weight safely:  A safe and healthy way to lose weight is to eat fewer calories and get regular exercise. You can lose up about 1 pound a week by decreasing the number of calories you eat by 500 calories each day. Healthy meal plan for weight management:  A healthy meal plan includes a variety of foods, contains fewer calories, and helps you stay healthy. A healthy meal plan includes the following:  Eat whole-grain foods more often. A healthy meal plan should contain fiber. Fiber is the part of grains, fruits, and vegetables that is not broken down by your body. Whole-grain foods are healthy and provide extra fiber in your diet. Some examples of whole-grain foods are whole-wheat breads and pastas, oatmeal, brown rice, and bulgur. Eat a variety of vegetables every day. Include dark, leafy greens such as spinach, kale, anton greens, and mustard greens. Eat yellow and orange vegetables such as carrots, sweet potatoes, and winter squash. Eat a variety of fruits every day. Choose fresh or canned fruit (canned in its own juice or light syrup) instead of juice. Fruit juice has very little or no fiber. Eat low-fat dairy foods. Drink fat-free (skim) milk or 1% milk. Eat fat-free yogurt and low-fat cottage cheese. Try low-fat cheeses such as mozzarella and other reduced-fat cheeses. Choose meat and other protein foods that are low in fat. Choose beans or other legumes such as split peas or lentils. Choose fish, skinless poultry (chicken or turkey), or lean cuts of red meat (beef or pork). Before you cook meat or poultry, cut off any visible fat. Use less fat and oil. Try baking foods instead of frying them. Add less fat, such as margarine, sour cream, regular salad dressing and mayonnaise to foods. Eat fewer high-fat foods.  Some examples of high-fat foods include french fries, doughnuts, ice cream, and cakes. Eat fewer sweets. Limit foods and drinks that are high in sugar. This includes candy, cookies, regular soda, and sweetened drinks. Exercise:  Exercise at least 30 minutes per day on most days of the week. Some examples of exercise include walking, biking, dancing, and swimming. You can also fit in more physical activity by taking the stairs instead of the elevator or parking farther away from stores. Ask your healthcare provider about the best exercise plan for you. © Copyright [employer] 2018 Information is for End User's use only and may not be sold, redistributed or otherwise used for commercial purposes.  All illustrations and images included in CareNotes® are the copyrighted property of [employer] or  [employer]

## 2023-12-13 ENCOUNTER — APPOINTMENT (OUTPATIENT)
Dept: LAB | Facility: CLINIC | Age: 68
End: 2023-12-13
Payer: MEDICARE

## 2023-12-13 DIAGNOSIS — E53.8 CYANOCOBALAMIN DEFICIENCY: ICD-10-CM

## 2023-12-13 DIAGNOSIS — E78.2 MIXED HYPERLIPIDEMIA: ICD-10-CM

## 2023-12-13 DIAGNOSIS — E78.1 HYPERTRIGLYCERIDEMIA: ICD-10-CM

## 2023-12-13 DIAGNOSIS — N18.31 STAGE 3A CHRONIC KIDNEY DISEASE (HCC): ICD-10-CM

## 2023-12-13 DIAGNOSIS — Z12.5 PROSTATE CANCER SCREENING: ICD-10-CM

## 2023-12-13 LAB
ALBUMIN SERPL BCP-MCNC: 4.3 G/DL (ref 3.5–5)
ALP SERPL-CCNC: 62 U/L (ref 34–104)
ALT SERPL W P-5'-P-CCNC: 6 U/L (ref 7–52)
ANION GAP SERPL CALCULATED.3IONS-SCNC: 6 MMOL/L
AST SERPL W P-5'-P-CCNC: 12 U/L (ref 13–39)
BASOPHILS # BLD AUTO: 0.02 THOUSANDS/ÂΜL (ref 0–0.1)
BASOPHILS NFR BLD AUTO: 0 % (ref 0–1)
BILIRUB SERPL-MCNC: 0.8 MG/DL (ref 0.2–1)
BUN SERPL-MCNC: 12 MG/DL (ref 5–25)
CALCIUM SERPL-MCNC: 9.5 MG/DL (ref 8.4–10.2)
CHLORIDE SERPL-SCNC: 104 MMOL/L (ref 96–108)
CHOLEST SERPL-MCNC: 199 MG/DL
CO2 SERPL-SCNC: 30 MMOL/L (ref 21–32)
CREAT SERPL-MCNC: 0.94 MG/DL (ref 0.6–1.3)
EOSINOPHIL # BLD AUTO: 0.06 THOUSAND/ÂΜL (ref 0–0.61)
EOSINOPHIL NFR BLD AUTO: 1 % (ref 0–6)
ERYTHROCYTE [DISTWIDTH] IN BLOOD BY AUTOMATED COUNT: 12.6 % (ref 11.6–15.1)
GFR SERPL CREATININE-BSD FRML MDRD: 83 ML/MIN/1.73SQ M
GLUCOSE P FAST SERPL-MCNC: 95 MG/DL (ref 65–99)
HCT VFR BLD AUTO: 45 % (ref 36.5–49.3)
HDLC SERPL-MCNC: 50 MG/DL
HGB BLD-MCNC: 15.2 G/DL (ref 12–17)
IMM GRANULOCYTES # BLD AUTO: 0.02 THOUSAND/UL (ref 0–0.2)
IMM GRANULOCYTES NFR BLD AUTO: 0 % (ref 0–2)
LDLC SERPL CALC-MCNC: 130 MG/DL (ref 0–100)
LYMPHOCYTES # BLD AUTO: 2.25 THOUSANDS/ÂΜL (ref 0.6–4.47)
LYMPHOCYTES NFR BLD AUTO: 36 % (ref 14–44)
MCH RBC QN AUTO: 30.2 PG (ref 26.8–34.3)
MCHC RBC AUTO-ENTMCNC: 33.8 G/DL (ref 31.4–37.4)
MCV RBC AUTO: 90 FL (ref 82–98)
MONOCYTES # BLD AUTO: 0.68 THOUSAND/ÂΜL (ref 0.17–1.22)
MONOCYTES NFR BLD AUTO: 11 % (ref 4–12)
NEUTROPHILS # BLD AUTO: 3.19 THOUSANDS/ÂΜL (ref 1.85–7.62)
NEUTS SEG NFR BLD AUTO: 52 % (ref 43–75)
NONHDLC SERPL-MCNC: 149 MG/DL
NRBC BLD AUTO-RTO: 0 /100 WBCS
PLATELET # BLD AUTO: 264 THOUSANDS/UL (ref 149–390)
PMV BLD AUTO: 9.2 FL (ref 8.9–12.7)
POTASSIUM SERPL-SCNC: 4 MMOL/L (ref 3.5–5.3)
PROT SERPL-MCNC: 6.8 G/DL (ref 6.4–8.4)
PSA SERPL-MCNC: 1.82 NG/ML (ref 0–4)
RBC # BLD AUTO: 5.03 MILLION/UL (ref 3.88–5.62)
SODIUM SERPL-SCNC: 140 MMOL/L (ref 135–147)
TRIGL SERPL-MCNC: 97 MG/DL
VIT B12 SERPL-MCNC: 169 PG/ML (ref 180–914)
WBC # BLD AUTO: 6.22 THOUSAND/UL (ref 4.31–10.16)

## 2023-12-13 PROCEDURE — G0103 PSA SCREENING: HCPCS

## 2023-12-13 PROCEDURE — 82607 VITAMIN B-12: CPT

## 2023-12-13 PROCEDURE — 80053 COMPREHEN METABOLIC PANEL: CPT

## 2023-12-13 PROCEDURE — 80061 LIPID PANEL: CPT

## 2023-12-13 PROCEDURE — 36415 COLL VENOUS BLD VENIPUNCTURE: CPT

## 2023-12-13 PROCEDURE — 85025 COMPLETE CBC W/AUTO DIFF WBC: CPT

## 2024-01-07 NOTE — PROGRESS NOTES
Referring Physician: SRUTHI Dubose  A copy of this note was sent to the referring physician.       Diagnoses and all orders for this visit:    BPH with obstruction/lower urinary tract symptoms            Assessment and plan:       1.  BPH  -Currently not on medications  -Inquires about minimally invasive surgical options    2.  Prostate cancer screening  -PSA 1.8  -     3.  Comorbidities: History of colitis, cervical and lumbar back pain with radiculopathy, stage III chronic kidney disease    Briefly discussed BPH management options that the patient was particular interested in [facility].  I shared my experience with this procedure which is none.  Patient will follow-up as needed      David Ramires MD      Chief Complaint     BPH consult      History of Present Illness     Dima Armendariz is a 68 y.o. referred for evaluation of BPH    Detailed Urologic History     - please refer to HPI    Review of Systems     Review of Systems   Constitutional: Negative for activity change and fatigue.   HENT: Negative for congestion.    Eyes: Negative for visual disturbance.   Respiratory: Negative for shortness of breath and wheezing.    Cardiovascular: Negative for chest pain and leg swelling.   Gastrointestinal: Negative for abdominal pain.   Endocrine: Negative for polyuria.   Genitourinary: Negative for dysuria, flank pain, hematuria and urgency.   Musculoskeletal: Negative for back pain.   Allergic/Immunologic: Negative for immunocompromised state.   Neurological: Negative for dizziness and numbness.   Psychiatric/Behavioral: Negative for dysphoric mood.   All other systems reviewed and are negative.      AUA SYMPTOM SCORE      Flowsheet Row Most Recent Value   AUA SYMPTOM SCORE    How often have you had a sensation of not emptying your bladder completely after you finished urinating? 1   How often have you had to urinate again less than two hours after you finished urinating? 4   How often have you found you  stopped and started again several times when you urinate? 5   How often have you found it difficult to postpone urination? 1   How often have you had a weak urinary stream? 5   How often have you had to push or strain to begin urination? 0   How many times did you most typically get up to urinate from the time you went to bed at night until the time you got up in the morning? 3   Quality of Life: If you were to spend the rest of your life with your urinary condition just the way it is now, how would you feel about that? 3   AUA SYMPTOM SCORE 19              Allergies     Allergies   Allergen Reactions    Codeine GI Intolerance    Pine Allergic Rhinitis    Pollen Extract Allergic Rhinitis       Physical Exam       Physical Exam  Constitutional:       General: He is not in acute distress.     Appearance: He is well-developed.   HENT:      Head: Normocephalic and atraumatic.   Cardiovascular:      Comments: Negative lower extremity edema  Pulmonary:      Effort: Pulmonary effort is normal.      Breath sounds: Normal breath sounds.   Abdominal:      Palpations: Abdomen is soft.   Musculoskeletal:         General: Normal range of motion.      Cervical back: Normal range of motion.   Skin:     General: Skin is warm.   Neurological:      Mental Status: He is alert and oriented to person, place, and time.   Psychiatric:         Behavior: Behavior normal.           Vital Signs  There were no vitals filed for this visit.      Current Medications       Current Outpatient Medications:     ALPRAZolam (XANAX) 1 mg tablet, Take 1 tablet (1 mg total) by mouth daily as needed for anxiety, Disp: 15 tablet, Rfl: 3    amoxicillin (AMOXIL) 500 mg capsule, , Disp: , Rfl:     Azelaic Acid (Finacea) 15 % cream, Use on face twice daily, Disp: 50 g, Rfl: 2    budesonide (ENTOCORT EC) 3 MG capsule, Take three 3mg tablets (for a total dose of 9mg daily) once daily for 6 weeks, then take two 3mg tablets (for a total dose of 6mg daily) once  daily for 1 week, then take one 3mg tablet daily for 1 week, Disp: 126 capsule, Rfl: 1    cholestyramine (QUESTRAN) 4 g packet, Take 1 packet (4 g total) by mouth 3 (three) times a day as needed (DIARRHEA) Please take other medication at least 3 hours apart from Questran, Disp: 60 packet, Rfl: 2    ciprofloxacin-dexamethasone (CIPRODEX) otic suspension, Administer 4 drops to the right ear 2 (two) times a day for 7 days (Patient not taking: Reported on 8/29/2023), Disp: 3 mL, Rfl: 0    clindamycin (CLINDAGEL) 1 % gel, Apply topically 2 (two) times a day Apply to nose/back, Disp: 60 g, Rfl: 2    levothyroxine 25 mcg tablet, Take 1 tablet (25 mcg total) by mouth daily, Disp: 90 tablet, Rfl: 1    zolpidem (AMBIEN) 10 mg tablet, Take 1 tablet (10 mg total) by mouth daily at bedtime as needed for sleep, Disp: 30 tablet, Rfl: 3    Current Facility-Administered Medications:     cyanocobalamin injection 1,000 mcg, 1,000 mcg, Intramuscular, Q30 Days, Leyla Melo MD, 1,000 mcg at 02/15/23 1155      Active Problems     Patient Active Problem List   Diagnosis    Anxiety    Chronic left-sided low back pain    Spondylosis of cervical region without myelopathy or radiculopathy    Weakness of left leg    Radiculopathy, lumbar region    Hyperlipidemia    Enlarged prostate without lower urinary tract symptoms (luts)    Cervical radiculitis    Obstructive sleep apnea    Insomnia    Hypothyroidism    Rosacea    Lower abdominal pain    Excessive flatus    Other constipation    Acute renal failure (HCC)    Stage 3a chronic kidney disease (HCC)    Change in bowel habits    Bloating    History of colon polyps    BPH with obstruction/lower urinary tract symptoms    Acne vulgaris    History of tobacco use    History of alcohol use    Hypertriglyceridemia    Pyridoxine deficiency    Ascorbic acid deficiency    Vitamin D deficiency    Cyanocobalamin deficiency    Unintended weight loss    Chronic diarrhea    Generalized abdominal pain  "   Lymphocytic colitis         Past Medical History     Past Medical History:   Diagnosis Date    Abdominal pain     Anxiety     BPH with obstruction/lower urinary tract symptoms     BPH with obstruction/lower urinary tract symptoms     Chronic diarrhea     Colon polyp     Disease of thyroid gland     Hypogonadism in male     Lightheadedness     last assessed-2015    Nocturia     Nocturia     Partial small bowel obstruction (HCC)     last assessed-2016    Shoulder injury, left, initial encounter     resolved:2017    Sixth cranial nerve palsy     last assessed-2015    Sleep apnea     Testicular pain, left 2020    Weight loss          Surgical History     Past Surgical History:   Procedure Laterality Date    COLONOSCOPY      NASAL SINUS SURGERY Bilateral     TONSILLECTOMY           Family History     Family History   Problem Relation Age of Onset    Cancer Other     Diabetes Family     Other Other         back disorder    Heart disease Brother          Social History     Social History     Social History     Tobacco Use   Smoking Status Former    Current packs/day: 0.00    Types: Cigarettes    Quit date: 1988    Years since quittin.6   Smokeless Tobacco Former    Types: Chew         Pertinent Lab Values     Lab Results   Component Value Date    CREATININE 0.94 2023       Lab Results   Component Value Date    PSA 1.82 2023    PSA 1.5 2022    PSA 1.8 2022       @RESULTRCNT(1H])@      Pertinent Imaging       No results found.      Portions of the record may have been created with voice recognition software.  Occasional wrong word or \"sound a like\" substitutions may have occurred due to the inherent limitations of voice recognition software.  In addition some of the content generated from this outpatient encounter includes information designed for patient education and/or communication back to the referring provider.  Read the chart carefully and recognize, using " context, where substitutions have occurred.

## 2024-01-08 ENCOUNTER — OFFICE VISIT (OUTPATIENT)
Dept: UROLOGY | Facility: CLINIC | Age: 69
End: 2024-01-08
Payer: MEDICARE

## 2024-01-08 VITALS
HEIGHT: 73 IN | DIASTOLIC BLOOD PRESSURE: 82 MMHG | OXYGEN SATURATION: 99 % | SYSTOLIC BLOOD PRESSURE: 132 MMHG | WEIGHT: 200 LBS | HEART RATE: 83 BPM | BODY MASS INDEX: 26.51 KG/M2

## 2024-01-08 DIAGNOSIS — N13.8 BPH WITH OBSTRUCTION/LOWER URINARY TRACT SYMPTOMS: Primary | ICD-10-CM

## 2024-01-08 DIAGNOSIS — N40.1 BPH WITH OBSTRUCTION/LOWER URINARY TRACT SYMPTOMS: Primary | ICD-10-CM

## 2024-01-08 PROCEDURE — 99203 OFFICE O/P NEW LOW 30 MIN: CPT | Performed by: UROLOGY

## 2024-01-16 DIAGNOSIS — E03.9 HYPOTHYROIDISM, UNSPECIFIED TYPE: ICD-10-CM

## 2024-01-16 RX ORDER — LEVOTHYROXINE SODIUM 0.03 MG/1
25 TABLET ORAL DAILY
Qty: 90 TABLET | Refills: 1 | Status: SHIPPED | OUTPATIENT
Start: 2024-01-16

## 2024-01-17 ENCOUNTER — TELEPHONE (OUTPATIENT)
Age: 69
End: 2024-01-17

## 2024-01-17 DIAGNOSIS — L70.0 ACNE VULGARIS: ICD-10-CM

## 2024-01-17 DIAGNOSIS — L71.9 ROSACEA: ICD-10-CM

## 2024-01-17 RX ORDER — AZELAIC ACID 0.15 G/G
GEL TOPICAL
Qty: 50 G | Refills: 2 | Status: SHIPPED | OUTPATIENT
Start: 2024-01-17

## 2024-01-17 NOTE — TELEPHONE ENCOUNTER
PA for Azelaic Acid (Finacea) 15 % cream Approved     Date(s) approved 12- to 7-        Patient advised by [x] [facility] Message                      [] Phone call       Pharmacy advised by [x]Fax                                     []Phone call    Approval letter scanned into Media Yes

## 2024-01-17 NOTE — TELEPHONE ENCOUNTER
PA for Azelaic Acid    Submitted via  []CMM-KEY   [x][employer]-Case ID # [id number]   []Faxed to plan   []Other website   []Phone call Case ID #     Office notes sent, clinical questions answered. Awaiting determination

## 2024-01-25 DIAGNOSIS — F41.9 ANXIETY: ICD-10-CM

## 2024-01-25 RX ORDER — ALPRAZOLAM 1 MG/1
TABLET ORAL
Qty: 15 TABLET | Refills: 0 | Status: SHIPPED | OUTPATIENT
Start: 2024-01-25

## 2024-04-03 DIAGNOSIS — G47.00 INSOMNIA, UNSPECIFIED TYPE: ICD-10-CM

## 2024-04-04 RX ORDER — ZOLPIDEM TARTRATE 10 MG/1
10 TABLET ORAL
Qty: 30 TABLET | Refills: 1 | Status: SHIPPED | OUTPATIENT
Start: 2024-04-04

## 2024-04-30 DIAGNOSIS — F41.9 ANXIETY: ICD-10-CM

## 2024-05-02 RX ORDER — ALPRAZOLAM 1 MG/1
TABLET ORAL
Qty: 15 TABLET | Refills: 1 | Status: SHIPPED | OUTPATIENT
Start: 2024-05-02

## 2024-07-11 DIAGNOSIS — E03.9 HYPOTHYROIDISM, UNSPECIFIED TYPE: ICD-10-CM

## 2024-07-12 RX ORDER — LEVOTHYROXINE SODIUM 0.03 MG/1
25 TABLET ORAL DAILY
Qty: 90 TABLET | Refills: 0 | Status: SHIPPED | OUTPATIENT
Start: 2024-07-12

## 2024-08-01 DIAGNOSIS — G47.00 INSOMNIA, UNSPECIFIED TYPE: ICD-10-CM

## 2024-08-02 RX ORDER — ZOLPIDEM TARTRATE 10 MG/1
10 TABLET ORAL
Qty: 30 TABLET | Refills: 3 | Status: SHIPPED | OUTPATIENT
Start: 2024-08-02

## 2024-08-29 DIAGNOSIS — F41.9 ANXIETY: ICD-10-CM

## 2024-08-30 RX ORDER — ALPRAZOLAM 1 MG
TABLET ORAL
Qty: 15 TABLET | Refills: 0 | Status: SHIPPED | OUTPATIENT
Start: 2024-08-30

## 2024-10-06 DIAGNOSIS — E03.9 HYPOTHYROIDISM, UNSPECIFIED TYPE: ICD-10-CM

## 2024-10-07 RX ORDER — LEVOTHYROXINE SODIUM 25 UG/1
25 TABLET ORAL DAILY
Qty: 90 TABLET | Refills: 1 | Status: SHIPPED | OUTPATIENT
Start: 2024-10-07

## 2024-11-13 ENCOUNTER — OFFICE VISIT (OUTPATIENT)
Dept: FAMILY MEDICINE CLINIC | Facility: CLINIC | Age: 69
End: 2024-11-13
Payer: MEDICARE

## 2024-11-13 VITALS
TEMPERATURE: 97.6 F | OXYGEN SATURATION: 99 % | HEIGHT: 73 IN | DIASTOLIC BLOOD PRESSURE: 58 MMHG | SYSTOLIC BLOOD PRESSURE: 118 MMHG | BODY MASS INDEX: 27.17 KG/M2 | WEIGHT: 205 LBS | RESPIRATION RATE: 17 BRPM | HEART RATE: 80 BPM

## 2024-11-13 DIAGNOSIS — N18.31 STAGE 3A CHRONIC KIDNEY DISEASE (HCC): ICD-10-CM

## 2024-11-13 DIAGNOSIS — F41.9 ANXIETY: ICD-10-CM

## 2024-11-13 DIAGNOSIS — E78.2 MIXED HYPERLIPIDEMIA: ICD-10-CM

## 2024-11-13 DIAGNOSIS — E03.9 ACQUIRED HYPOTHYROIDISM: Primary | ICD-10-CM

## 2024-11-13 DIAGNOSIS — L71.9 ROSACEA: ICD-10-CM

## 2024-11-13 DIAGNOSIS — K52.9 CHRONIC DIARRHEA: ICD-10-CM

## 2024-11-13 DIAGNOSIS — M47.812 SPONDYLOSIS OF CERVICAL REGION WITHOUT MYELOPATHY OR RADICULOPATHY: ICD-10-CM

## 2024-11-13 DIAGNOSIS — Z12.5 PROSTATE CANCER SCREENING: ICD-10-CM

## 2024-11-13 DIAGNOSIS — M48.52XA COLLAPSED VERTEBRA, NOT ELSEWHERE CLASSIFIED, CERVICAL REGION, INITIAL ENCOUNTER FOR FRACTURE (HCC): ICD-10-CM

## 2024-11-13 DIAGNOSIS — N40.1 BPH WITH OBSTRUCTION/LOWER URINARY TRACT SYMPTOMS: ICD-10-CM

## 2024-11-13 DIAGNOSIS — E53.8 CYANOCOBALAMIN DEFICIENCY: ICD-10-CM

## 2024-11-13 DIAGNOSIS — Z13.820 OSTEOPOROSIS SCREENING: ICD-10-CM

## 2024-11-13 DIAGNOSIS — Z87.898 HISTORY OF ALCOHOL USE: ICD-10-CM

## 2024-11-13 DIAGNOSIS — L70.0 ACNE VULGARIS: ICD-10-CM

## 2024-11-13 DIAGNOSIS — N13.8 BPH WITH OBSTRUCTION/LOWER URINARY TRACT SYMPTOMS: ICD-10-CM

## 2024-11-13 DIAGNOSIS — E78.1 HYPERTRIGLYCERIDEMIA: ICD-10-CM

## 2024-11-13 DIAGNOSIS — R10.84 GENERALIZED ABDOMINAL PAIN: ICD-10-CM

## 2024-11-13 PROCEDURE — 99214 OFFICE O/P EST MOD 30 MIN: CPT | Performed by: NURSE PRACTITIONER

## 2024-11-13 PROCEDURE — G2211 COMPLEX E/M VISIT ADD ON: HCPCS | Performed by: NURSE PRACTITIONER

## 2024-11-13 RX ORDER — AZELAIC ACID 0.15 G/G
GEL TOPICAL
Qty: 50 G | Refills: 2 | Status: SHIPPED | OUTPATIENT
Start: 2024-11-13

## 2024-11-13 NOTE — ASSESSMENT & PLAN NOTE
Occasional flares responsive to Finacea RX cream  Orders:    Azelaic Acid (Finacea) 15 % cream; Use on face twice daily

## 2024-11-13 NOTE — ASSESSMENT & PLAN NOTE
Lab Results   Component Value Date    EGFR 83 12/13/2023    EGFR 76 07/26/2023    EGFR 69 01/20/2023    CREATININE 0.94 12/13/2023    CREATININE 1.01 07/26/2023    CREATININE 1.10 01/20/2023       Orders:    Comprehensive metabolic panel; Future

## 2024-11-13 NOTE — PROGRESS NOTES
Ambulatory Visit  Name: Dima Armendariz      : 1955      MRN: [id number]  Encounter Provider: SRUTHI Dubose  Encounter Date: 2024   Encounter department: [facility] [place]    Assessment & Plan  Rosacea  Occasional flares responsive to Finacea RX cream  Orders:    Azelaic Acid (Finacea) 15 % cream; Use on face twice daily    Acne vulgaris    Orders:    Azelaic Acid (Finacea) 15 % cream; Use on face twice daily    Stage 3a chronic kidney disease (HCC)  Lab Results   Component Value Date    EGFR 83 2023    EGFR 76 2023    EGFR 69 2023    CREATININE 0.94 2023    CREATININE 1.01 2023    CREATININE 1.10 2023       Orders:    Comprehensive metabolic panel; Future    Acquired hypothyroidism    Orders:    TSH, 3rd generation with Free T4 reflex; Future    Cyanocobalamin deficiency    Orders:    Vitamin B12; Future    Hypertriglyceridemia         Mixed hyperlipidemia    Orders:    Lipid panel; Future    TSH, 3rd generation with Free T4 reflex; Future    Generalized abdominal pain    Orders:    CBC and differential; Future    Comprehensive metabolic panel; Future    Prostate cancer screening    Orders:    PSA, Total Screen; Future    Osteoporosis screening    Orders:    DXA bone density spine hip and pelvis; Future    Spondylosis of cervical region without myelopathy or radiculopathy    Orders:    DXA bone density spine hip and pelvis; Future    History of alcohol use    Orders:    DXA bone density spine hip and pelvis; Future    Collapsed vertebra, not elsewhere classified, cervical region, initial encounter for fracture ([facility])    Orders:    DXA bone density spine hip and pelvis; Future    Chronic diarrhea         Anxiety         BPH with obstruction/lower urinary tract symptoms       Dima will return for lab review and annual wellness visit in 1 to 2 months     History of Present Illness     69-year-old male known to me presents for follow-up of multiple  "issues    1.  He is ongoing nocturia and frequency  He will see Dr Vang, urologist, in January.  He hopes to have a specific minimally invasive prostate procedure to reduce his symptoms  He is due for PSA after December 13-I will order this today    2.He is requesting a refill of Finacea for his rosacea.  He has been on it for several years.  It is the only treatment that has been effective in minimizing symptoms quickly.  His insurance requires prior Auth which ran out in July    3.  Last summer July 2023 he had issues with chronic diarrhea and weight loss.  He had very comprehensive workup including stool testing, endoscopic he, colonoscopy, comprehensive blood work which was essentially unremarkable for anything significant.  Gastroenterology diagnosed him with irritable bowel syndrome.  He endorses that it is no better no worse on most days    4.  He is due for all blood work.  He is requesting an order for a DEXA scan.          Review of Systems   Constitutional:  Negative for fatigue and unexpected weight change.   HENT:  Negative for trouble swallowing.    Eyes:  Negative for visual disturbance.   Respiratory: Negative.     Cardiovascular: Negative.    Gastrointestinal:         Per HPI   Genitourinary:  Negative for dysuria and hematuria.        Nocturia related to enlarged prostate   Musculoskeletal:  Positive for arthralgias and back pain.   Skin:         Rosacea   Neurological: Negative.    Psychiatric/Behavioral:  Negative for sleep disturbance. The patient is nervous/anxious.            Objective     /58 (BP Location: Left arm, Patient Position: Sitting, Cuff Size: Large)   Pulse 80   Temp 97.6 °F (36.4 °C) (Temporal)   Resp 17   Ht 6' 1\" (1.854 m)   Wt 93 kg (205 lb)   SpO2 99%   BMI 27.05 kg/m²     Physical Exam  Vitals and nursing note reviewed.   Constitutional:       General: He is not in acute distress.     Appearance: Normal appearance.   HENT:      Head:     Eyes:      Pupils: " Pupils are equal, round, and reactive to light.   Neck:      Vascular: No carotid bruit.   Cardiovascular:      Rate and Rhythm: Normal rate and regular rhythm.      Pulses: Normal pulses.   Pulmonary:      Effort: Pulmonary effort is normal.      Breath sounds: Normal breath sounds.   Abdominal:      General: Bowel sounds are normal.      Palpations: Abdomen is soft.      Tenderness: There is no abdominal tenderness.   Musculoskeletal:      Right lower leg: No edema.      Left lower leg: No edema.   Lymphadenopathy:      Cervical: No cervical adenopathy.   Skin:     General: Skin is warm and dry.   Neurological:      General: No focal deficit present.      Mental Status: He is alert. Mental status is at baseline.   Psychiatric:         Mood and Affect: Mood normal.

## 2024-11-14 ENCOUNTER — TELEPHONE (OUTPATIENT)
Age: 69
End: 2024-11-14

## 2024-11-14 NOTE — TELEPHONE ENCOUNTER
PA Azelaic Acid (Finacea) 15 % cream APPROVED         Patient advised by          []MyChart Message  []Phone call   [x]LMOM  []L/M to call office as no active Communication consent on file  []Unable to leave detailed message as VM not approved on Communication consent       Pharmacy advised by    [x]Fax  []Phone call

## 2024-11-14 NOTE — TELEPHONE ENCOUNTER
PA Azelaic Acid (Finacea) 15 % cream SUBMITTED     [facility]     via    []CMM-KEY:    [x]Surescripts-Case ID # [id number]  []Availity-Auth ID #  NDC #    []Faxed to plan   []Other website    []Phone call Case ID #      []PA sent as URGENT    All office notes, labs and other pertaining documents and studies sent. Clinical questions answered. Awaiting determination from insurance company.     Turnaround time for your insurance to make a decision on your Prior Authorization can take 7-21 business days.

## 2024-11-27 DIAGNOSIS — Z00.6 ENCOUNTER FOR EXAMINATION FOR NORMAL COMPARISON OR CONTROL IN CLINICAL RESEARCH PROGRAM: ICD-10-CM

## 2024-11-27 DIAGNOSIS — F41.9 ANXIETY: ICD-10-CM

## 2024-11-29 RX ORDER — ALPRAZOLAM 1 MG/1
TABLET ORAL
Qty: 15 TABLET | Refills: 0 | Status: SHIPPED | OUTPATIENT
Start: 2024-11-29

## 2024-12-19 ENCOUNTER — APPOINTMENT (OUTPATIENT)
Dept: LAB | Age: 69
End: 2024-12-19
Payer: MEDICARE

## 2024-12-19 DIAGNOSIS — E78.2 MIXED HYPERLIPIDEMIA: ICD-10-CM

## 2024-12-19 DIAGNOSIS — E53.8 CYANOCOBALAMIN DEFICIENCY: ICD-10-CM

## 2024-12-19 DIAGNOSIS — Z00.6 ENCOUNTER FOR EXAMINATION FOR NORMAL COMPARISON OR CONTROL IN CLINICAL RESEARCH PROGRAM: ICD-10-CM

## 2024-12-19 DIAGNOSIS — R10.84 GENERALIZED ABDOMINAL PAIN: ICD-10-CM

## 2024-12-19 DIAGNOSIS — E03.9 ACQUIRED HYPOTHYROIDISM: ICD-10-CM

## 2024-12-19 DIAGNOSIS — Z12.5 PROSTATE CANCER SCREENING: ICD-10-CM

## 2024-12-19 DIAGNOSIS — N18.31 STAGE 3A CHRONIC KIDNEY DISEASE (HCC): ICD-10-CM

## 2024-12-19 LAB
ALBUMIN SERPL BCG-MCNC: 4.3 G/DL (ref 3.5–5)
ALP SERPL-CCNC: 51 U/L (ref 34–104)
ALT SERPL W P-5'-P-CCNC: 10 U/L (ref 7–52)
ANION GAP SERPL CALCULATED.3IONS-SCNC: 7 MMOL/L (ref 4–13)
AST SERPL W P-5'-P-CCNC: 19 U/L (ref 13–39)
BASOPHILS # BLD AUTO: 0.03 THOUSANDS/ΜL (ref 0–0.1)
BASOPHILS NFR BLD AUTO: 1 % (ref 0–1)
BILIRUB SERPL-MCNC: 0.83 MG/DL (ref 0.2–1)
BUN SERPL-MCNC: 12 MG/DL (ref 5–25)
CALCIUM SERPL-MCNC: 9.6 MG/DL (ref 8.4–10.2)
CHLORIDE SERPL-SCNC: 101 MMOL/L (ref 96–108)
CHOLEST SERPL-MCNC: 145 MG/DL (ref ?–200)
CO2 SERPL-SCNC: 31 MMOL/L (ref 21–32)
CREAT SERPL-MCNC: 0.98 MG/DL (ref 0.6–1.3)
EOSINOPHIL # BLD AUTO: 0.09 THOUSAND/ΜL (ref 0–0.61)
EOSINOPHIL NFR BLD AUTO: 2 % (ref 0–6)
ERYTHROCYTE [DISTWIDTH] IN BLOOD BY AUTOMATED COUNT: 12.3 % (ref 11.6–15.1)
GFR SERPL CREATININE-BSD FRML MDRD: 78 ML/MIN/1.73SQ M
GLUCOSE P FAST SERPL-MCNC: 101 MG/DL (ref 65–99)
HCT VFR BLD AUTO: 44.7 % (ref 36.5–49.3)
HDLC SERPL-MCNC: 39 MG/DL
HGB BLD-MCNC: 14.7 G/DL (ref 12–17)
IMM GRANULOCYTES # BLD AUTO: 0.04 THOUSAND/UL (ref 0–0.2)
IMM GRANULOCYTES NFR BLD AUTO: 1 % (ref 0–2)
LDLC SERPL CALC-MCNC: 86 MG/DL (ref 0–100)
LYMPHOCYTES # BLD AUTO: 2.53 THOUSANDS/ΜL (ref 0.6–4.47)
LYMPHOCYTES NFR BLD AUTO: 41 % (ref 14–44)
MCH RBC QN AUTO: 29.4 PG (ref 26.8–34.3)
MCHC RBC AUTO-ENTMCNC: 32.9 G/DL (ref 31.4–37.4)
MCV RBC AUTO: 89 FL (ref 82–98)
MONOCYTES # BLD AUTO: 0.63 THOUSAND/ΜL (ref 0.17–1.22)
MONOCYTES NFR BLD AUTO: 10 % (ref 4–12)
NEUTROPHILS # BLD AUTO: 2.79 THOUSANDS/ΜL (ref 1.85–7.62)
NEUTS SEG NFR BLD AUTO: 45 % (ref 43–75)
NONHDLC SERPL-MCNC: 106 MG/DL
NRBC BLD AUTO-RTO: 0 /100 WBCS
PLATELET # BLD AUTO: 291 THOUSANDS/UL (ref 149–390)
PMV BLD AUTO: 9.6 FL (ref 8.9–12.7)
POTASSIUM SERPL-SCNC: 4.8 MMOL/L (ref 3.5–5.3)
PROT SERPL-MCNC: 6.6 G/DL (ref 6.4–8.4)
PSA SERPL-MCNC: 2.13 NG/ML (ref 0–4)
RBC # BLD AUTO: 5 MILLION/UL (ref 3.88–5.62)
SODIUM SERPL-SCNC: 139 MMOL/L (ref 135–147)
TRIGL SERPL-MCNC: 100 MG/DL (ref ?–150)
TSH SERPL DL<=0.05 MIU/L-ACNC: 2.13 UIU/ML (ref 0.45–4.5)
VIT B12 SERPL-MCNC: 135 PG/ML (ref 180–914)
WBC # BLD AUTO: 6.11 THOUSAND/UL (ref 4.31–10.16)

## 2024-12-19 PROCEDURE — 80061 LIPID PANEL: CPT

## 2024-12-19 PROCEDURE — G0103 PSA SCREENING: HCPCS

## 2024-12-19 PROCEDURE — 80053 COMPREHEN METABOLIC PANEL: CPT

## 2024-12-19 PROCEDURE — 84443 ASSAY THYROID STIM HORMONE: CPT

## 2024-12-19 PROCEDURE — 82607 VITAMIN B-12: CPT

## 2024-12-19 PROCEDURE — 36415 COLL VENOUS BLD VENIPUNCTURE: CPT

## 2024-12-19 PROCEDURE — 85025 COMPLETE CBC W/AUTO DIFF WBC: CPT

## 2024-12-20 ENCOUNTER — RESULTS FOLLOW-UP (OUTPATIENT)
Dept: FAMILY MEDICINE CLINIC | Facility: CLINIC | Age: 69
End: 2024-12-20

## 2024-12-30 LAB
APOB+LDLR+PCSK9 GENE MUT ANL BLD/T: NOT DETECTED
BRCA1+BRCA2 DEL+DUP + FULL MUT ANL BLD/T: NOT DETECTED
MLH1+MSH2+MSH6+PMS2 GN DEL+DUP+FUL M: NOT DETECTED

## 2025-01-07 ENCOUNTER — OFFICE VISIT (OUTPATIENT)
Dept: FAMILY MEDICINE CLINIC | Facility: CLINIC | Age: 70
End: 2025-01-07

## 2025-01-07 VITALS
HEIGHT: 73 IN | SYSTOLIC BLOOD PRESSURE: 116 MMHG | WEIGHT: 202 LBS | BODY MASS INDEX: 26.77 KG/M2 | RESPIRATION RATE: 17 BRPM | DIASTOLIC BLOOD PRESSURE: 68 MMHG | TEMPERATURE: 97.9 F | HEART RATE: 74 BPM | OXYGEN SATURATION: 98 %

## 2025-01-07 DIAGNOSIS — R42 DIZZINESS: ICD-10-CM

## 2025-01-07 DIAGNOSIS — I95.89 EXERTIONAL HYPOTENSION: ICD-10-CM

## 2025-01-07 DIAGNOSIS — N18.31 STAGE 3A CHRONIC KIDNEY DISEASE (HCC): ICD-10-CM

## 2025-01-07 DIAGNOSIS — Z00.00 MEDICARE ANNUAL WELLNESS VISIT, SUBSEQUENT: Primary | ICD-10-CM

## 2025-01-07 NOTE — PROGRESS NOTES
Name: Dima Armendariz      : 1955      MRN: [id number]  Encounter Provider: SRUTHI Mendez  Encounter Date: 2025   Encounter department: [facility] [place]    Assessment & Plan  Medicare annual wellness visit, subsequent            Preventive health issues were discussed with patient, and age appropriate screening tests were ordered as noted in patient's After Visit Summary. Personalized health advice and appropriate referrals for health education or preventive services given if needed, as noted in patient's After Visit Summary.    History of Present Illness     HPI   Patient Care Team:  SRUTHI Mendez as PCP - General (Family Medicine)  Jeffrey Gevirtz, MD Lea Reyes, MD Joshua M Wert, DO (Pain Medicine)    Review of Systems  Medical History Reviewed by provider this encounter:  Tobacco  Allergies  Meds  Problems  Med Hx  Surg Hx  Fam Hx       Annual Wellness Visit Questionnaire   Dima is here for his Subsequent Wellness visit. Last Medicare Wellness visit information reviewed, patient interviewed and updates made to the record today.      Health Risk Assessment:   Patient rates overall health as very good. Patient feels that their physical health rating is same. Patient is very satisfied with their life. Eyesight was rated as same. Hearing was rated as slightly worse. Patient feels that their emotional and mental health rating is same. Patients states they are sometimes angry. Patient states they are sometimes unusually tired/fatigued. Pain experienced in the last 7 days has been some. Patient's pain rating has been 3/10. Patient states that he has experienced no weight loss or gain in last 6 months.     Depression Screening:   PHQ-2 Score: 0      Fall Risk Screening:   In the past year, patient has experienced: no history of falling in past year      Home Safety:  Patient does not have trouble with stairs inside or outside of their home. Patient has working smoke  alarms and has working carbon monoxide detector. Home safety hazards include: none.     Nutrition:   Current diet is Regular.     Medications:   Patient is not currently taking any over-the-counter supplements. Patient is able to manage medications.     Activities of Daily Living (ADLs)/Instrumental Activities of Daily Living (IADLs):   Walk and transfer into and out of bed and chair?: Yes  Dress and groom yourself?: Yes    Bathe or shower yourself?: Yes    Feed yourself? Yes  Do your laundry/housekeeping?: Yes  Manage your money, pay your bills and track your expenses?: Yes  Make your own meals?: Yes    Do your own shopping?: Yes    Previous Hospitalizations:   Any hospitalizations or ED visits within the last 12 months?: No      Advance Care Planning:   Living will: No    Durable POA for healthcare: No    Advanced directive: No    Advanced directive counseling given: Yes    ACP document given: Yes      Cognitive Screening:   Provider or family/friend/caregiver concerned regarding cognition?: No    PREVENTIVE SCREENINGS      Cardiovascular Screening:    General: History Lipid Disorder and Screening Current      Diabetes Screening:     General: Screening Current      Colorectal Cancer Screening:     General: Screening Current      Prostate Cancer Screening:    General: Screening Current      Osteoporosis Screening:    General: Screening Not Indicated      Abdominal Aortic Aneurysm (AAA) Screening:    Risk factors include: age between 65-74 yo and tobacco use        General: Screening Not Indicated      Lung Cancer Screening:     General: Screening Not Indicated      Hepatitis C Screening:    General: Screening Current    Screening, Brief Intervention, and Referral to Treatment (SBIRT)    Screening  Typical number of drinks in a day: 0  Typical number of drinks in a week: 2  Interpretation: Low risk drinking behavior.    Single Item Drug Screening:  How often have you used an illegal drug (including marijuana) or a  "prescription medication for non-medical reasons in the past year? never    Single Item Drug Screen Score: 0  Interpretation: Negative screen for possible drug use disorder    Brief Intervention  Alcohol & drug use screenings were reviewed. No concerns regarding substance use disorder identified.     Social Drivers of Health     Financial Resource Strain: Low Risk  (12/12/2023)    Overall Financial Resource Strain (CARDIA)     Difficulty of Paying Living Expenses: Not hard at all   Food Insecurity: No Food Insecurity (1/7/2025)    Hunger Vital Sign     Worried About Running Out of Food in the Last Year: Never true     Ran Out of Food in the Last Year: Never true   Transportation Needs: No Transportation Needs (1/7/2025)    PRAPARE - Transportation     Lack of Transportation (Medical): No     Lack of Transportation (Non-Medical): No   Housing Stability: Low Risk  (1/7/2025)    Housing Stability Vital Sign     Unable to Pay for Housing in the Last Year: No     Number of Times Moved in the Last Year: 1     Homeless in the Last Year: No   Utilities: Not At Risk (1/7/2025)    [facility] Utilities     Threatened with loss of utilities: No     No results found.    Objective   /68 (BP Location: Left arm, Patient Position: Sitting, Cuff Size: Large)   Pulse 74   Temp 97.9 °F (36.6 °C) (Temporal)   Resp 17   Ht 6' 1\" (1.854 m)   Wt 91.6 kg (202 lb)   SpO2 98%   BMI 26.65 kg/m²     Physical Exam    "

## 2025-01-09 ENCOUNTER — CONSULT (OUTPATIENT)
Dept: CARDIOLOGY CLINIC | Facility: CLINIC | Age: 70
End: 2025-01-09
Payer: MEDICARE

## 2025-01-09 VITALS
SYSTOLIC BLOOD PRESSURE: 102 MMHG | HEART RATE: 69 BPM | HEIGHT: 73 IN | OXYGEN SATURATION: 98 % | BODY MASS INDEX: 26.4 KG/M2 | WEIGHT: 199.2 LBS | DIASTOLIC BLOOD PRESSURE: 66 MMHG

## 2025-01-09 DIAGNOSIS — R06.09 DYSPNEA ON EXERTION: ICD-10-CM

## 2025-01-09 DIAGNOSIS — I95.89 EXERTIONAL HYPOTENSION: Primary | ICD-10-CM

## 2025-01-09 DIAGNOSIS — R42 DIZZINESS: ICD-10-CM

## 2025-01-09 PROCEDURE — 99204 OFFICE O/P NEW MOD 45 MIN: CPT | Performed by: INTERNAL MEDICINE

## 2025-01-09 PROCEDURE — 93000 ELECTROCARDIOGRAM COMPLETE: CPT | Performed by: INTERNAL MEDICINE

## 2025-01-09 NOTE — LETTER
January 9, 2025     SRUTHI Mendez  [address]  [address]  [facility] [address]    Patient: Dima Armendariz   YOB: 1955   Date of Visit: 1/9/2025       Dear MsByron Granadosartie:    Thank you for referring Dima Armendariz to me for evaluation. Below are my notes for this consultation.    If you have questions, please do not hesitate to call me. I look forward to following your patient along with you.         Sincerely,        Luis Pichardo MD        CC: No Recipients    Luis Pichardo MD  1/9/2025  7:51 PM  Sign when Signing Visit  Cardiology   Dima Armendariz 69 y.o. male MRN: [id number]   Encounter: [id number]        Reason for Consult / Principal Problem: Presyncope    Provider requesting Consult: SRUTHI Mendez    PCP: SRUTHI Mendez        Assessment:    Assessment & Plan  Exertional hypotension    Dizziness    Dyspnea on exertion           Plan:    -Check echocardiogram to evaluate for structural heart disease  -Check exercise stress test to evaluate dyspnea on exertion and dizziness with exertion  -Counseled on adequate hydration including electrolytes  -Recommend Jobst stockings especially while exercising  -Return to office in 3 months or sooner if indicated      CC: Lightheadedness, dyspnea on exertion    HPI  69 y.o. male presents with the above symptoms.  He has had episodes of presyncope after exercise.  He has never completely passed out.  He does have dyspnea with exercise.  He exercises regularly.  The symptoms are fairly frequent.  He sometimes has lightheadedness while standing from a recumbent position as well.  Denies any ongoing palpitations or chest pain.      The patient denies chest pain, palpitations orthopnea, PND, pedal edema, syncope,  diaphoresis, nausea/vomiting       The 10-year ASCVD risk score (Cora DK, et al., 2019) is: 10.2%    Values used to calculate the score:      Age: 69 years      Sex: Male      Is Non- : No      Diabetic:  "No      Tobacco smoker: No      Systolic Blood Pressure: 102 mmHg      Is BP treated: No      HDL Cholesterol: 39 mg/dL      Total Cholesterol: 145 mg/dL            Physical exam  Objective  Vitals: Blood pressure 102/66, pulse 69, height 6' 1\" (1.854 m), weight 90.4 kg (199 lb 3.2 oz), SpO2 98%.    General:  AO x3, no acute distress  Cardiac:  S1-S2 normal,  No murmurs. No rubs or gallops, JVP: normal  Lungs:  Clear to auscultation bilaterally, no wheezing or crackles.  Abdomen:  Soft, nontender, nondistended.  Extremities:  Warm, well perfused, pulses palpable, no ulcers or rashes. Edema:absent  Neuro: Grossly nonfocal        ======================================================  TREADMILL STRESS  No results found for this or any previous visit.     ----------------------------------------------------------------------------------------------  NUCLEAR STRESS TEST: No results found for this or any previous visit.    No results found for this or any previous visit.      --------------------------------------------------------------------------------  CATH:  No results found for this or any previous visit.    --------------------------------------------------------------------------------  ECHO:   No results found for this or any previous visit.    No results found for this or any previous visit.    --------------------------------------------------------------------------------  HOLTER  No results found for this or any previous visit.    --------------------------------------------------------------------------------  CAROTIDS  Results for orders placed during the hospital encounter of 07/24/19    VAS carotid complete study    Narrative  [facility] REPORT  CLINICAL:  Indications:  Yearly surveillance of carotid artery disease.  Patient is asymptomatic at this  time.  Operative History:  No Cardiovascular Surgeries  Risk Factors  The patient has history of HLD and previous smoking (quit >10yrs " ago).    FINDINGS:    Right        Impression  PSV  EDV (cm/s)  Direction of Flow  Ratio  Dist. ICA                 54          18                      0.63  Mid. ICA                  64          23                      0.73  Prox. ICA    Normal       49          10                      0.56  Dist CCA                  81          21  Mid CCA                   87          17                      0.88  Prox CCA                  99          19                      0.79  Ext Carotid               81          11                      0.94  Prox Vert                 36           9  Antegrade  Subclavian               136           0  Innominate               125          11    Left         Impression  PSV  EDV (cm/s)  Direction of Flow  Ratio  Dist. ICA                 47          18                      0.51  Mid. ICA                  42          14                      0.45  Prox. ICA    Normal       46          11                      0.51  Dist CCA                  75          19  Mid CCA                   91          20                      0.82  Prox CCA                 112          19  Ext Carotid               70          11                      0.76  Prox Vert                 33          11  Antegrade  Subclavian               135           0        CONCLUSION:    Impression  RIGHT:  There is no evidence of disease throughout the extracranial carotid arteries.  Vertebral artery flow is antegrade.  There is no significant subclavian artery disease.    LEFT:  There is no evidence of disease throughout the extracranial carotid arteries.  Vertebral artery flow is antegrade.  There is no significant subclavian artery disease.    Internal carotid artery stenosis determination by consensus criteria from:  DAHLIA Giordano., et.al. Carotid Artery Stenosis: Gray-Scale and Doppler US Diagnosis  - Society of Radiologists in Ultrasound Consensus Conference, Radiology 2003;  229:340-346.    SIGNATURE:  Electronically Signed by:  HÉCTOR GUERRA MD on 2019 05:31:57 PM       Diagnoses and all orders for this visit:    Exertional hypotension  -     Ambulatory Referral to Cardiology  -     POCT ECG    Dizziness  -     Ambulatory Referral to Cardiology  -     POCT ECG       ======================================================          Review of Systems  ROS as noted above, otherwise 12 point review of systems was performed and is negative.     Historical Information  Past Medical History:   Diagnosis Date   • Abdominal pain    • Anxiety    • BPH with obstruction/lower urinary tract symptoms    • BPH with obstruction/lower urinary tract symptoms    • Chronic diarrhea    • Colon polyp    • Disease of thyroid gland    • Hypogonadism in male    • Lightheadedness     last assessed-2015   • Nocturia    • Nocturia    • Partial small bowel obstruction (HCC)     last assessed-2016   • Shoulder injury, left, initial encounter     resolved:2017   • Sixth cranial nerve palsy     last assessed-2015   • Sleep apnea    • Testicular pain, left 2020   • Weight loss      Past Surgical History:   Procedure Laterality Date   • COLONOSCOPY     • NASAL SINUS SURGERY Bilateral    • TONSILLECTOMY       Social History     Substance and Sexual Activity   Alcohol Use Yes   • Alcohol/week: 1.0 standard drink of alcohol   • Types: 1 Cans of beer per week    Comment: social     Social History     Substance and Sexual Activity   Drug Use No     Social History     Tobacco Use   Smoking Status Former   • Current packs/day: 0.00   • Types: Cigarettes   • Quit date: 1988   • Years since quittin.6   Smokeless Tobacco Former   • Types: Chew     Family History   Problem Relation Age of Onset   • Cancer Other    • Diabetes Family    • Other Other         back disorder   • Heart disease Brother        Meds/Allergies  Hospital Medications:   Current Facility-Administered Medications:   •  cyanocobalamin injection 1,000 mcg, Q30 Days  Home  "Medications: Not in a hospital admission.    Allergies   Allergen Reactions   • Codeine GI Intolerance   • Pine Allergic Rhinitis   • Pollen Extract Allergic Rhinitis         Portions of the record may have been created with voice recognition software.  Occasional wrong words or \"sound a like\" substitutions may have occurred due to the inherent limitations of voice recognition software.  Read the chart carefully and recognize, using context, where substitutions have occurred.        I have spent a total of 45 min in reviewing and/or ordering tests, medications, or procedures, performing an examination or evaluation, reviewing pertinent history, counseling and educating the patient, referring and/or communicating with other health care professionals, documenting in the EMR and general coordination of care of the patient today.              "

## 2025-01-09 NOTE — PROGRESS NOTES
"Cardiology   Dima Armendariz 69 y.o. male MRN: [id number]   Encounter: [id number]        Reason for Consult / Principal Problem: Presyncope    Provider requesting Consult: SRUTHI Dubose    PCP: SRUTHI Dubose        Assessment:    Assessment & Plan  Exertional hypotension    Dizziness    Dyspnea on exertion           Plan:    -Check echocardiogram to evaluate for structural heart disease  -Check exercise stress test to evaluate dyspnea on exertion and dizziness with exertion  -Counseled on adequate hydration including electrolytes  -Recommend Jobst stockings especially while exercising  -Return to office in 3 months or sooner if indicated      CC: Lightheadedness, dyspnea on exertion    HPI  69 y.o. male presents with the above symptoms.  He has had episodes of presyncope after exercise.  He has never completely passed out.  He does have dyspnea with exercise.  He exercises regularly.  The symptoms are fairly frequent.  He sometimes has lightheadedness while standing from a recumbent position as well.  Denies any ongoing palpitations or chest pain.      The patient denies chest pain, palpitations orthopnea, PND, pedal edema, syncope,  diaphoresis, nausea/vomiting       The 10-year ASCVD risk score (Cora CRANE, et al., 2019) is: 10.2%    Values used to calculate the score:      Age: 69 years      Sex: Male      Is Non- : No      Diabetic: No      Tobacco smoker: No      Systolic Blood Pressure: 102 mmHg      Is BP treated: No      HDL Cholesterol: 39 mg/dL      Total Cholesterol: 145 mg/dL            Physical exam  Objective   Vitals: Blood pressure 102/66, pulse 69, height 6' 1\" (1.854 m), weight 90.4 kg (199 lb 3.2 oz), SpO2 98%.    General:  AO x3, no acute distress  Cardiac:  S1-S2 normal,  No murmurs. No rubs or gallops, JVP: normal  Lungs:  Clear to auscultation bilaterally, no wheezing or crackles.  Abdomen:  Soft, nontender, nondistended.  Extremities:  Warm, well perfused, " pulses palpable, no ulcers or rashes. Edema:absent  Neuro: Grossly nonfocal        ======================================================  TREADMILL STRESS  No results found for this or any previous visit.     ----------------------------------------------------------------------------------------------  NUCLEAR STRESS TEST: No results found for this or any previous visit.    No results found for this or any previous visit.      --------------------------------------------------------------------------------  CATH:  No results found for this or any previous visit.    --------------------------------------------------------------------------------  ECHO:   No results found for this or any previous visit.    No results found for this or any previous visit.    --------------------------------------------------------------------------------  HOLTER  No results found for this or any previous visit.    --------------------------------------------------------------------------------  CAROTIDS  Results for orders placed during the hospital encounter of 07/24/19    VAS carotid complete study    Narrative  [facility]PORT  CLINICAL:  Indications:  Yearly surveillance of carotid artery disease.  Patient is asymptomatic at this  time.  Operative History:  No Cardiovascular Surgeries  Risk Factors  The patient has history of HLD and previous smoking (quit >10yrs ago).    FINDINGS:    Right        Impression  PSV  EDV (cm/s)  Direction of Flow  Ratio  Dist. ICA                 54          18                      0.63  Mid. ICA                  64          23                      0.73  Prox. ICA    Normal       49          10                      0.56  Dist CCA                  81          21  Mid CCA                   87          17                      0.88  Prox CCA                  99          19                      0.79  Ext Carotid               81          11                      0.94  Prox Vert                  36           9  Antegrade  Subclavian               136           0  Innominate               125          11    Left         Impression  PSV  EDV (cm/s)  Direction of Flow  Ratio  Dist. ICA                 47          18                      0.51  Mid. ICA                  42          14                      0.45  Prox. ICA    Normal       46          11                      0.51  Dist CCA                  75          19  Mid CCA                   91          20                      0.82  Prox CCA                 112          19  Ext Carotid               70          11                      0.76  Prox Vert                 33          11  Antegrade  Subclavian               135           0        CONCLUSION:    Impression  RIGHT:  There is no evidence of disease throughout the extracranial carotid arteries.  Vertebral artery flow is antegrade.  There is no significant subclavian artery disease.    LEFT:  There is no evidence of disease throughout the extracranial carotid arteries.  Vertebral artery flow is antegrade.  There is no significant subclavian artery disease.    Internal carotid artery stenosis determination by consensus criteria from:  SANTOSH Giordano, et.al. Carotid Artery Stenosis: Gray-Scale and Doppler US Diagnosis  - Society of Radiologists in Ultrasound Consensus Conference, Radiology 2003;  229:340-346.    SIGNATURE:  Electronically Signed by: HÉCTOR GUERRA MD on 2019-07-24 05:31:57 PM       Diagnoses and all orders for this visit:    Exertional hypotension  -     Ambulatory Referral to Cardiology  -     POCT ECG    Dizziness  -     Ambulatory Referral to Cardiology  -     POCT ECG       ======================================================          Review of Systems  ROS as noted above, otherwise 12 point review of systems was performed and is negative.     Historical Information   Past Medical History:   Diagnosis Date    Abdominal pain     Anxiety     BPH with obstruction/lower urinary tract  "symptoms     BPH with obstruction/lower urinary tract symptoms     Chronic diarrhea     Colon polyp     Disease of thyroid gland     Hypogonadism in male     Lightheadedness     last assessed-2015    Nocturia     Nocturia     Partial small bowel obstruction (HCC)     last assessed-2016    Shoulder injury, left, initial encounter     resolved:2017    Sixth cranial nerve palsy     last assessed-2015    Sleep apnea     Testicular pain, left 2020    Weight loss      Past Surgical History:   Procedure Laterality Date    COLONOSCOPY      NASAL SINUS SURGERY Bilateral     TONSILLECTOMY       Social History     Substance and Sexual Activity   Alcohol Use Yes    Alcohol/week: 1.0 standard drink of alcohol    Types: 1 Cans of beer per week    Comment: social     Social History     Substance and Sexual Activity   Drug Use No     Social History     Tobacco Use   Smoking Status Former    Current packs/day: 0.00    Types: Cigarettes    Quit date: 1988    Years since quittin.6   Smokeless Tobacco Former    Types: Chew     Family History   Problem Relation Age of Onset    Cancer Other     Diabetes Family     Other Other         back disorder    Heart disease Brother        Meds/Allergies   Hospital Medications:   Current Facility-Administered Medications:     cyanocobalamin injection 1,000 mcg, Q30 Days  Home Medications: Not in a hospital admission.    Allergies   Allergen Reactions    Codeine GI Intolerance    Pine Allergic Rhinitis    Pollen Extract Allergic Rhinitis         Portions of the record may have been created with voice recognition software.  Occasional wrong words or \"sound a like\" substitutions may have occurred due to the inherent limitations of voice recognition software.  Read the chart carefully and recognize, using context, where substitutions have occurred.        I have spent a total of 45 min in reviewing and/or ordering tests, medications, or procedures, performing an " examination or evaluation, reviewing pertinent history, counseling and educating the patient, referring and/or communicating with other health care professionals, documenting in the EMR and general coordination of care of the patient today.

## 2025-02-03 ENCOUNTER — HOSPITAL ENCOUNTER (OUTPATIENT)
Dept: NON INVASIVE DIAGNOSTICS | Facility: CLINIC | Age: 70
Discharge: HOME | End: 2025-02-03
Payer: MEDICARE

## 2025-02-03 VITALS
WEIGHT: 199 LBS | DIASTOLIC BLOOD PRESSURE: 68 MMHG | SYSTOLIC BLOOD PRESSURE: 130 MMHG | BODY MASS INDEX: 26.37 KG/M2 | HEIGHT: 73 IN | OXYGEN SATURATION: 99 % | HEART RATE: 71 BPM

## 2025-02-03 VITALS
DIASTOLIC BLOOD PRESSURE: 66 MMHG | BODY MASS INDEX: 26.37 KG/M2 | HEIGHT: 73 IN | SYSTOLIC BLOOD PRESSURE: 102 MMHG | HEART RATE: 58 BPM | WEIGHT: 199 LBS

## 2025-02-03 DIAGNOSIS — R42 DIZZINESS: ICD-10-CM

## 2025-02-03 DIAGNOSIS — I95.89 EXERTIONAL HYPOTENSION: ICD-10-CM

## 2025-02-03 DIAGNOSIS — F41.9 ANXIETY: ICD-10-CM

## 2025-02-03 DIAGNOSIS — R06.09 DYSPNEA ON EXERTION: ICD-10-CM

## 2025-02-03 LAB
AORTIC ROOT: 4.2 CM
AORTIC VALVE MEAN VELOCITY: 8.3 M/S
ASCENDING AORTA: 3.3 CM
AV LVOT MEAN GRADIENT: 2 MMHG
AV LVOT PEAK GRADIENT: 3 MMHG
AV MEAN PRESS GRAD SYS DOP V1V2: 3 MMHG
AV PEAK GRADIENT: 6 MMHG
AV VELOCITY RATIO: 0.77
AV VMAX SYS DOP: 1.26 M/S
BSA FOR ECHO PROCEDURE: 2.15 M2
DOP CALC AO VTI: 27.44 CM
DOP CALC LVOT PEAK VEL VTI: 21.07 CM
DOP CALC LVOT PEAK VEL: 0.91 M/S
E WAVE DECELERATION TIME: 267 MS
E/A RATIO: 1.35
FRACTIONAL SHORTENING: 29 (ref 28–44)
INTERVENTRICULAR SEPTUM IN DIASTOLE (PARASTERNAL SHORT AXIS VIEW): 1 CM
INTERVENTRICULAR SEPTUM: 1 CM (ref 0.6–1.1)
LAAS-AP2: 24.7 CM2
LAAS-AP4: 18.8 CM2
LEFT ATRIUM SIZE: 4.3 CM
LEFT ATRIUM VOLUME (MOD BIPLANE): 68 ML
LEFT ATRIUM VOLUME INDEX (MOD BIPLANE): 31.6 ML/M2
LEFT INTERNAL DIMENSION IN SYSTOLE: 3.6 CM (ref 2.1–4)
LEFT VENTRICLE DIASTOLIC VOLUME (MOD BIPLANE): 143 ML
LEFT VENTRICLE DIASTOLIC VOLUME INDEX (MOD BIPLANE): 66.5 ML/M2
LEFT VENTRICLE SYSTOLIC VOLUME (MOD BIPLANE): 50 ML
LEFT VENTRICLE SYSTOLIC VOLUME INDEX (MOD BIPLANE): 23.3 ML/M2
LEFT VENTRICULAR INTERNAL DIMENSION IN DIASTOLE: 5.1 CM (ref 3.5–6)
LEFT VENTRICULAR POSTERIOR WALL IN END DIASTOLE: 0.9 CM
LEFT VENTRICULAR STROKE VOLUME: 71 ML
LV EF BIPLANE MOD: 65 %
LV EF US.2D.A4C+ESTIMATED: 65 %
LVSV (TEICH): 71 ML
MAX HR PERCENT: 107 %
MAX HR: 164 BPM
MV E'TISSUE VEL-LAT: 11 CM/S
MV E'TISSUE VEL-SEP: 9 CM/S
MV PEAK A VEL: 0.49 M/S
MV PEAK E VEL: 66 CM/S
MV STENOSIS PRESSURE HALF TIME: 77 MS
MV VALVE AREA P 1/2 METHOD: 2.86
RATE PRESSURE PRODUCT: NORMAL
RIGHT ATRIAL 2D VOLUME: 49 ML
RIGHT ATRIUM AREA SYSTOLE A4C: 17.4 CM2
RIGHT VENTRICLE ID DIMENSION: 3.7 CM
SL CV LEFT ATRIUM LENGTH A2C: 5.8 CM
SL CV LV EF: 60
SL CV PED ECHO LEFT VENTRICLE DIASTOLIC VOLUME (MOD BIPLANE) 2D: 126 ML
SL CV PED ECHO LEFT VENTRICLE SYSTOLIC VOLUME (MOD BIPLANE) 2D: 55 ML
SL CV STRESS RECOVERY BP: NORMAL MMHG
SL CV STRESS RECOVERY HR: 96 BPM
SL CV STRESS RECOVERY O2 SAT: 98 %
SL CV STRESS STAGE REACHED: 3
STRESS ANGINA INDEX: 0
STRESS BASELINE BP: NORMAL MMHG
STRESS BASELINE HR: 71 BPM
STRESS O2 SAT REST: 99 %
STRESS PEAK HR: 164 BPM
STRESS POST ESTIMATED WORKLOAD: 10.1 METS
STRESS POST EXERCISE DUR MIN: 7 MIN
STRESS POST EXERCISE DUR SEC: 16 SEC
STRESS POST O2 SAT PEAK: 98 %
STRESS POST PEAK BP: 150 MMHG
TR MAX PG: 24 MMHG
TR PEAK VELOCITY: 2.5 M/S
TRICUSPID ANNULAR PLANE SYSTOLIC EXCURSION: 2.4 CM
TRICUSPID VALVE PEAK REGURGITATION VELOCITY: 2.45 M/S

## 2025-02-03 PROCEDURE — 93018 CV STRESS TEST I&R ONLY: CPT | Performed by: STUDENT IN AN ORGANIZED HEALTH CARE EDUCATION/TRAINING PROGRAM

## 2025-02-03 PROCEDURE — 93306 TTE W/DOPPLER COMPLETE: CPT

## 2025-02-03 PROCEDURE — 93017 CV STRESS TEST TRACING ONLY: CPT

## 2025-02-03 PROCEDURE — 93016 CV STRESS TEST SUPVJ ONLY: CPT | Performed by: STUDENT IN AN ORGANIZED HEALTH CARE EDUCATION/TRAINING PROGRAM

## 2025-02-03 PROCEDURE — 93306 TTE W/DOPPLER COMPLETE: CPT | Performed by: STUDENT IN AN ORGANIZED HEALTH CARE EDUCATION/TRAINING PROGRAM

## 2025-02-03 RX ORDER — ALPRAZOLAM 1 MG/1
TABLET ORAL
Qty: 15 TABLET | Refills: 0 | Status: SHIPPED | OUTPATIENT
Start: 2025-02-03

## 2025-02-04 ENCOUNTER — DOCUMENTATION (OUTPATIENT)
Dept: CARDIOLOGY CLINIC | Facility: CLINIC | Age: 70
End: 2025-02-04

## 2025-02-04 ENCOUNTER — TELEPHONE (OUTPATIENT)
Dept: CARDIOLOGY CLINIC | Facility: CLINIC | Age: 70
End: 2025-02-04

## 2025-02-04 DIAGNOSIS — I77.810 AORTIC ROOT DILATION (HCC): Primary | ICD-10-CM

## 2025-02-04 LAB
CHEST PAIN STATEMENT: NORMAL
MAX DIASTOLIC BP: 78 MMHG
MAX PREDICTED HEART RATE: 152 BPM
PROTOCOL NAME: NORMAL
STRESS POST EXERCISE DUR MIN: 7 MIN
STRESS POST EXERCISE DUR SEC: 16 SEC
STRESS POST PEAK HR: 164 BPM
STRESS POST PEAK SYSTOLIC BP: 150 MMHG
TARGET HR FORMULA: NORMAL
TEST INDICATION: NORMAL

## 2025-02-04 NOTE — TELEPHONE ENCOUNTER
I called the patient regarding his normal echo and stress results. F/u echo in 1 year due to aortic root dilation. He verbalized understanding.

## 2025-04-23 DIAGNOSIS — E03.9 HYPOTHYROIDISM, UNSPECIFIED TYPE: ICD-10-CM

## 2025-04-23 DIAGNOSIS — F41.9 ANXIETY: ICD-10-CM

## 2025-04-23 DIAGNOSIS — G47.00 INSOMNIA, UNSPECIFIED TYPE: ICD-10-CM

## 2025-04-24 RX ORDER — LEVOTHYROXINE SODIUM 25 UG/1
25 TABLET ORAL DAILY
Qty: 90 TABLET | Refills: 1 | Status: SHIPPED | OUTPATIENT
Start: 2025-04-24

## 2025-04-24 RX ORDER — ZOLPIDEM TARTRATE 10 MG/1
10 TABLET ORAL
Qty: 30 TABLET | Refills: 1 | Status: SHIPPED | OUTPATIENT
Start: 2025-04-24

## 2025-04-24 RX ORDER — ALPRAZOLAM 1 MG/1
TABLET ORAL
Qty: 15 TABLET | Refills: 1 | Status: SHIPPED | OUTPATIENT
Start: 2025-04-24

## 2025-05-15 ENCOUNTER — OFFICE VISIT (OUTPATIENT)
Dept: CARDIOLOGY CLINIC | Facility: CLINIC | Age: 70
End: 2025-05-15
Payer: MEDICARE

## 2025-05-15 VITALS
DIASTOLIC BLOOD PRESSURE: 58 MMHG | HEIGHT: 73 IN | OXYGEN SATURATION: 98 % | BODY MASS INDEX: 26.19 KG/M2 | HEART RATE: 70 BPM | SYSTOLIC BLOOD PRESSURE: 116 MMHG | WEIGHT: 197.6 LBS

## 2025-05-15 DIAGNOSIS — G47.00 INSOMNIA, UNSPECIFIED TYPE: ICD-10-CM

## 2025-05-15 DIAGNOSIS — R42 DIZZINESS: ICD-10-CM

## 2025-05-15 DIAGNOSIS — I77.810 AORTIC ROOT DILATION (HCC): ICD-10-CM

## 2025-05-15 DIAGNOSIS — E78.5 DYSLIPIDEMIA: Primary | ICD-10-CM

## 2025-05-15 DIAGNOSIS — R06.83 SNORING: ICD-10-CM

## 2025-05-15 DIAGNOSIS — I35.1 MILD AORTIC REGURGITATION: ICD-10-CM

## 2025-05-15 PROCEDURE — 99214 OFFICE O/P EST MOD 30 MIN: CPT | Performed by: INTERNAL MEDICINE

## 2025-05-15 NOTE — LETTER
May 15, 2025     SRUTHI Mendez  [address] [facility]  [address]  [facility] [address]    Patient: Dima Armendariz   YOB: 1955   Date of Visit: 5/15/2025       Dear SRUTHI Mendez:    Thank you for referring Dima Armendariz to me for evaluation. Below are my notes for this consultation.    If you have questions, please do not hesitate to call me. I look forward to following your patient along with you.         Sincerely,        Luis Pichardo MD        CC: No Recipients    Luis Pichardo MD  5/15/2025  6:36 PM  Sign when Signing Visit  Cardiology   Dima Armendariz 69 y.o. male MRN: [id number]   Encounter: [id number]        Reason for Consult / Principal Problem: Presyncope    Provider requesting Consult: SRUTHI Mendez    PCP: SRUTHI Mendez      Assessment & Plan  Dyslipidemia    Aortic root dilation (HCC)    Dizziness    Insomnia, unspecified type    Snoring    Mild aortic regurgitation           Plan:    -Check echocardiogram in 1 year to evaluate aorta size and evaluate degree of aortic regurgitation  -Check coronary calcium score and lipid panel prior to next visit  - Refer to sleep medicine for insomnia and snoring  -Counseled on adequate hydration including electrolytes  -Recommend Jobst stockings especially while exercising  -Return to office in 6 months or sooner if indicated      5/15/2025: No symptoms, lightheadedness is improved.  He denies any chest pain or shortness of breath.  He has not been watching his diet but has maintained the weight that he previously lost    CC: Lightheadedness, dyspnea on exertion    HPI  69 y.o. male presents with the above symptoms.  He has had episodes of presyncope after exercise.  He has never completely passed out.  He does have dyspnea with exercise.  He exercises regularly.  The symptoms are fairly frequent.  He sometimes has lightheadedness while standing from a recumbent position as well.  Denies any ongoing palpitations or chest  "pain.      The patient denies chest pain, palpitations orthopnea, PND, pedal edema, syncope,  diaphoresis, nausea/vomiting       The 10-year ASCVD risk score (Cora CRANE, et al., 2019) is: 12.6%    Values used to calculate the score:      Age: 69 years      Clincally relevant sex: Male      Is Non- : No      Diabetic: No      Tobacco smoker: No      Systolic Blood Pressure: 116 mmHg      Is BP treated: No      HDL Cholesterol: 39 mg/dL      Total Cholesterol: 145 mg/dL            Physical exam  Objective  Vitals: Blood pressure 116/58, pulse 70, height 6' 1\" (1.854 m), weight 89.6 kg (197 lb 9.6 oz), SpO2 98%.    General:  AO x3, no acute distress  Cardiac:  S1-S2 normal,  No murmurs. No rubs or gallops, JVP: normal  Lungs:  Clear to auscultation bilaterally, no wheezing or crackles.  Abdomen:  Soft, nontender, nondistended.  Extremities:  Warm, well perfused, pulses palpable, no ulcers or rashes. Edema:absent  Neuro: Grossly nonfocal        ======================================================  TREADMILL STRESS  No results found for this or any previous visit.     ----------------------------------------------------------------------------------------------  NUCLEAR STRESS TEST: No results found for this or any previous visit.    No results found for this or any previous visit.      --------------------------------------------------------------------------------  CATH:  No results found for this or any previous visit.    --------------------------------------------------------------------------------  ECHO:   No results found for this or any previous visit.    No results found for this or any previous visit.    --------------------------------------------------------------------------------  HOLTER  No results found for this or any previous visit.    --------------------------------------------------------------------------------  CAROTIDS  Results for orders placed during the hospital " encounter of 07/24/19    VAS carotid complete study    Narrative  [facility] REPORT  CLINICAL:  Indications:  Yearly surveillance of carotid artery disease.  Patient is asymptomatic at this  time.  Operative History:  No Cardiovascular Surgeries  Risk Factors  The patient has history of HLD and previous smoking (quit >10yrs ago).    FINDINGS:    Right        Impression  PSV  EDV (cm/s)  Direction of Flow  Ratio  Dist. ICA                 54          18                      0.63  Mid. ICA                  64          23                      0.73  Prox. ICA    Normal       49          10                      0.56  Dist CCA                  81          21  Mid CCA                   87          17                      0.88  Prox CCA                  99          19                      0.79  Ext Carotid               81          11                      0.94  Prox Vert                 36           9  Antegrade  Subclavian               136           0  Innominate               125          11    Left         Impression  PSV  EDV (cm/s)  Direction of Flow  Ratio  Dist. ICA                 47          18                      0.51  Mid. ICA                  42          14                      0.45  Prox. ICA    Normal       46          11                      0.51  Dist CCA                  75          19  Mid CCA                   91          20                      0.82  Prox CCA                 112          19  Ext Carotid               70          11                      0.76  Prox Vert                 33          11  Antegrade  Subclavian               135           0        CONCLUSION:    Impression  RIGHT:  There is no evidence of disease throughout the extracranial carotid arteries.  Vertebral artery flow is antegrade.  There is no significant subclavian artery disease.    LEFT:  There is no evidence of disease throughout the extracranial carotid arteries.  Vertebral artery flow is antegrade.  There is no  significant subclavian artery disease.    Internal carotid artery stenosis determination by consensus criteria from:  SANTOSH Giordano, et.al. Carotid Artery Stenosis: Gray-Scale and Doppler US Diagnosis  - Society of Radiologists in Ultrasound Consensus Conference, Radiology 2003;  [id number].    SIGNATURE:  Electronically Signed by: HÉCTOR GUERRA MD on 2019 05:31:57 PM       Diagnoses and all orders for this visit:    Dyslipidemia  -     Lipid Panel With Direct LDL; Future       ======================================================          Review of Systems  ROS as noted above, otherwise 12 point review of systems was performed and is negative.     Historical Information  Past Medical History:   Diagnosis Date   • Abdominal pain    • Anxiety    • BPH with obstruction/lower urinary tract symptoms    • BPH with obstruction/lower urinary tract symptoms    • Chronic diarrhea    • Colon polyp    • Disease of thyroid gland    • Hypogonadism in male    • Lightheadedness     last assessed-2015   • Nocturia    • Nocturia    • Partial small bowel obstruction (HCC)     last assessed-2016   • Shoulder injury, left, initial encounter     resolved:2017   • Sixth cranial nerve palsy     last assessed-2015   • Sleep apnea    • Testicular pain, left 2020   • Weight loss      Past Surgical History:   Procedure Laterality Date   • COLONOSCOPY     • NASAL SINUS SURGERY Bilateral    • TONSILLECTOMY       Social History     Substance and Sexual Activity   Alcohol Use Yes   • Alcohol/week: 1.0 standard drink of alcohol   • Types: 1 Cans of beer per week    Comment: social     Social History     Substance and Sexual Activity   Drug Use No     Social History     Tobacco Use   Smoking Status Former   • Current packs/day: 0.00   • Types: Cigarettes   • Quit date: 1988   • Years since quittin.9   Smokeless Tobacco Former   • Types: Chew     Family History   Problem Relation Age of Onset   • Cancer  "Other    • Diabetes Family    • Other Other         back disorder   • Heart disease Brother        Meds/Allergies  Hospital Medications:   Current Facility-Administered Medications:   •  cyanocobalamin injection 1,000 mcg, Q30 Days  Home Medications: Not in a hospital admission.    Allergies   Allergen Reactions   • Codeine GI Intolerance   • Pine Allergic Rhinitis   • Pollen Extract Allergic Rhinitis         Portions of the record may have been created with voice recognition software.  Occasional wrong words or \"sound a like\" substitutions may have occurred due to the inherent limitations of voice recognition software.  Read the chart carefully and recognize, using context, where substitutions have occurred.        I have spent a total of 35 min in reviewing and/or ordering tests, medications, or procedures, performing an examination or evaluation, reviewing pertinent history, counseling and educating the patient, referring and/or communicating with other health care professionals, documenting in the EMR and general coordination of care of the patient today.              "

## 2025-05-16 ENCOUNTER — HOSPITAL ENCOUNTER (OUTPATIENT)
Dept: CT IMAGING | Facility: HOSPITAL | Age: 70
Discharge: HOME | End: 2025-05-16
Attending: INTERNAL MEDICINE
Payer: COMMERCIAL

## 2025-05-16 DIAGNOSIS — E78.5 DYSLIPIDEMIA: ICD-10-CM

## 2025-05-16 PROCEDURE — 75571 CT HRT W/O DYE W/CA TEST: CPT

## 2025-05-22 ENCOUNTER — RESULTS FOLLOW-UP (OUTPATIENT)
Dept: CARDIOLOGY CLINIC | Facility: CLINIC | Age: 70
End: 2025-05-22

## 2025-05-22 DIAGNOSIS — E78.5 DYSLIPIDEMIA: Primary | ICD-10-CM

## 2025-05-22 RX ORDER — ROSUVASTATIN CALCIUM 5 MG/1
5 TABLET, COATED ORAL DAILY
Qty: 90 TABLET | Refills: 1 | Status: SHIPPED | OUTPATIENT
Start: 2025-05-22

## 2025-05-22 NOTE — TELEPHONE ENCOUNTER
----- Message from Luis Pichardo MD sent at 5/22/2025  3:15 PM EDT -----  His calcium score is moderately elevated.  We should start a cholesterol medicine if he is willing to take it I would like to start rosuvastatin 10 mg.  ----- Message -----  From: Interface, Radiology Results In  Sent: 5/22/2025   3:07 PM EDT  To: Luis Pichardo MD      I called the patient and l/m to call the office for his test results.

## 2025-05-22 NOTE — TELEPHONE ENCOUNTER
Pt returned call; provided him with Dr. Pichardo's results and recommendations. The patient is asking if he could start at 5 mg of Rosuvastatin instead of 10 mg and also asking when his lipids would be reassessed. If 5mg Ok, please send to the [facility] pharmacy.

## 2025-05-23 ENCOUNTER — TELEPHONE (OUTPATIENT)
Dept: CARDIOLOGY CLINIC | Facility: CLINIC | Age: 70
End: 2025-05-23

## 2025-05-23 NOTE — TELEPHONE ENCOUNTER
I called the patient and l/m regarding starting Rosuvastatin 5 mg, and to recheck labs in 3 months.

## 2025-06-24 ENCOUNTER — APPOINTMENT (EMERGENCY)
Dept: RADIOLOGY | Facility: HOSPITAL | Age: 70
End: 2025-06-24
Payer: MEDICARE

## 2025-06-24 ENCOUNTER — HOSPITAL ENCOUNTER (EMERGENCY)
Facility: HOSPITAL | Age: 70
Discharge: HOME | End: 2025-06-24
Attending: EMERGENCY MEDICINE
Payer: MEDICARE

## 2025-06-24 VITALS
OXYGEN SATURATION: 99 % | TEMPERATURE: 98 F | SYSTOLIC BLOOD PRESSURE: 128 MMHG | RESPIRATION RATE: 18 BRPM | DIASTOLIC BLOOD PRESSURE: 67 MMHG | HEART RATE: 66 BPM

## 2025-06-24 DIAGNOSIS — F41.0 PANIC ATTACK: ICD-10-CM

## 2025-06-24 DIAGNOSIS — E83.42 HYPOMAGNESEMIA: ICD-10-CM

## 2025-06-24 DIAGNOSIS — T67.9XXA HEAT EXPOSURE, INITIAL ENCOUNTER: Primary | ICD-10-CM

## 2025-06-24 LAB
ALBUMIN SERPL BCG-MCNC: 4.3 G/DL (ref 3.5–5)
ALP SERPL-CCNC: 53 U/L (ref 34–104)
ALT SERPL W P-5'-P-CCNC: 8 U/L (ref 7–52)
ANION GAP SERPL CALCULATED.3IONS-SCNC: 16 MMOL/L (ref 4–13)
AST SERPL W P-5'-P-CCNC: 13 U/L (ref 13–39)
ATRIAL RATE: 84 BPM
BASE EX.OXY STD BLDV CALC-SCNC: 74.7 % (ref 60–80)
BASE EXCESS BLDV CALC-SCNC: -0.8 MMOL/L
BASOPHILS # BLD AUTO: 0.03 THOUSANDS/ÂΜL (ref 0–0.1)
BASOPHILS NFR BLD AUTO: 0 % (ref 0–1)
BILIRUB SERPL-MCNC: 0.61 MG/DL (ref 0.2–1)
BUN SERPL-MCNC: 15 MG/DL (ref 5–25)
CALCIUM SERPL-MCNC: 10.1 MG/DL (ref 8.4–10.2)
CARDIAC TROPONIN I PNL SERPL HS: <2 NG/L (ref ?–50)
CHLORIDE SERPL-SCNC: 104 MMOL/L (ref 96–108)
CO2 SERPL-SCNC: 18 MMOL/L (ref 21–32)
CREAT SERPL-MCNC: 1 MG/DL (ref 0.6–1.3)
EOSINOPHIL # BLD AUTO: 0.03 THOUSAND/ÂΜL (ref 0–0.61)
EOSINOPHIL NFR BLD AUTO: 0 % (ref 0–6)
ERYTHROCYTE [DISTWIDTH] IN BLOOD BY AUTOMATED COUNT: 12.7 % (ref 11.6–15.1)
GFR SERPL CREATININE-BSD FRML MDRD: 76 ML/MIN/1.73SQ M
GLUCOSE SERPL-MCNC: 112 MG/DL (ref 65–140)
HCO3 BLDV-SCNC: 23.9 MMOL/L (ref 24–30)
HCT VFR BLD AUTO: 40.2 % (ref 36.5–49.3)
HGB BLD-MCNC: 14.2 G/DL (ref 12–17)
IMM GRANULOCYTES # BLD AUTO: 0.05 THOUSAND/UL (ref 0–0.2)
IMM GRANULOCYTES NFR BLD AUTO: 1 % (ref 0–2)
LYMPHOCYTES # BLD AUTO: 2.15 THOUSANDS/ÂΜL (ref 0.6–4.47)
LYMPHOCYTES NFR BLD AUTO: 23 % (ref 14–44)
MAGNESIUM SERPL-MCNC: 1.5 MG/DL (ref 1.9–2.7)
MCH RBC QN AUTO: 30.5 PG (ref 26.8–34.3)
MCHC RBC AUTO-ENTMCNC: 35.3 G/DL (ref 31.4–37.4)
MCV RBC AUTO: 86 FL (ref 82–98)
MONOCYTES # BLD AUTO: 0.9 THOUSAND/ÂΜL (ref 0.17–1.22)
MONOCYTES NFR BLD AUTO: 10 % (ref 4–12)
NEUTROPHILS # BLD AUTO: 6.36 THOUSANDS/ÂΜL (ref 1.85–7.62)
NEUTS SEG NFR BLD AUTO: 66 % (ref 43–75)
NRBC BLD AUTO-RTO: 0 /100 WBCS
O2 CT BLDV-SCNC: 14.6 ML/DL
P AXIS: 61 DEGREES
PCO2 BLDV: 39.4 MM HG (ref 42–50)
PH BLDV: 7.4 [PH] (ref 7.3–7.4)
PLATELET # BLD AUTO: 256 THOUSANDS/UL (ref 149–390)
PMV BLD AUTO: 9.5 FL (ref 8.9–12.7)
PO2 BLDV: 40.4 MM HG (ref 35–45)
POTASSIUM SERPL-SCNC: 3.3 MMOL/L (ref 3.5–5.3)
PR INTERVAL: 140 MS
PROT SERPL-MCNC: 6.5 G/DL (ref 6.4–8.4)
QRS AXIS: 61 DEGREES
QRSD INTERVAL: 116 MS
QT INTERVAL: 420 MS
QTC INTERVAL: 496 MS
RBC # BLD AUTO: 4.66 MILLION/UL (ref 3.88–5.62)
SODIUM SERPL-SCNC: 138 MMOL/L (ref 135–147)
T WAVE AXIS: 67 DEGREES
VENTRICULAR RATE: 84 BPM
WBC # BLD AUTO: 9.52 THOUSAND/UL (ref 4.31–10.16)

## 2025-06-24 PROCEDURE — 36415 COLL VENOUS BLD VENIPUNCTURE: CPT

## 2025-06-24 PROCEDURE — 99285 EMERGENCY DEPT VISIT HI MDM: CPT | Performed by: EMERGENCY MEDICINE

## 2025-06-24 PROCEDURE — 96360 HYDRATION IV INFUSION INIT: CPT

## 2025-06-24 PROCEDURE — 80053 COMPREHEN METABOLIC PANEL: CPT

## 2025-06-24 PROCEDURE — 85025 COMPLETE CBC W/AUTO DIFF WBC: CPT

## 2025-06-24 PROCEDURE — 96374 THER/PROPH/DIAG INJ IV PUSH: CPT

## 2025-06-24 PROCEDURE — 71045 X-RAY EXAM CHEST 1 VIEW: CPT

## 2025-06-24 PROCEDURE — 82805 BLOOD GASES W/O2 SATURATION: CPT | Performed by: EMERGENCY MEDICINE

## 2025-06-24 PROCEDURE — 93005 ELECTROCARDIOGRAM TRACING: CPT

## 2025-06-24 PROCEDURE — 99284 EMERGENCY DEPT VISIT MOD MDM: CPT

## 2025-06-24 PROCEDURE — 93010 ELECTROCARDIOGRAM REPORT: CPT | Performed by: STUDENT IN AN ORGANIZED HEALTH CARE EDUCATION/TRAINING PROGRAM

## 2025-06-24 PROCEDURE — 83735 ASSAY OF MAGNESIUM: CPT

## 2025-06-24 PROCEDURE — 84484 ASSAY OF TROPONIN QUANT: CPT

## 2025-06-24 RX ORDER — LANOLIN ALCOHOL/MO/W.PET/CERES
400 CREAM (GRAM) TOPICAL 2 TIMES DAILY
Qty: 60 TABLET | Refills: 0 | Status: SHIPPED | OUTPATIENT
Start: 2025-06-24

## 2025-06-24 RX ORDER — DIAZEPAM 10 MG/2ML
2.5 INJECTION, SOLUTION INTRAMUSCULAR; INTRAVENOUS ONCE
Status: COMPLETED | OUTPATIENT
Start: 2025-06-24 | End: 2025-06-24

## 2025-06-24 RX ORDER — LANOLIN ALCOHOL/MO/W.PET/CERES
400 CREAM (GRAM) TOPICAL 2 TIMES DAILY
Status: DISCONTINUED | OUTPATIENT
Start: 2025-06-24 | End: 2025-06-24 | Stop reason: HOSPADM

## 2025-06-24 RX ORDER — HYDROXYZINE HYDROCHLORIDE 25 MG/1
25 TABLET, FILM COATED ORAL EVERY 6 HOURS
Qty: 12 TABLET | Refills: 0 | Status: SHIPPED | OUTPATIENT
Start: 2025-06-24

## 2025-06-24 RX ADMIN — SODIUM CHLORIDE 1000 ML: 0.9 INJECTION, SOLUTION INTRAVENOUS at 17:22

## 2025-06-24 RX ADMIN — DIAZEPAM 2.5 MG: 10 INJECTION, SOLUTION INTRAMUSCULAR; INTRAVENOUS at 17:25

## 2025-06-24 RX ADMIN — Medication 400 MG: at 18:49

## 2025-06-24 NOTE — ED PROVIDER NOTES
Time reflects when diagnosis was documented in both MDM as applicable and the Disposition within this note       Time User Action Codes Description Comment    6/24/2025  7:04 PM Rikki Monae Add [T67.9XXA] Heat exposure, initial encounter     6/24/2025  7:04 PM Rikki Monae Add [F41.0] Panic attack     6/24/2025  7:06 PM Rikki Monae Add [E83.42] Hypomagnesemia           ED Disposition       ED Disposition   Discharge    Condition   Stable    Date/Time   Tue Jun 24, 2025  7:04 PM    Comment   Dima THOMPSON Kala discharge to home/self care.                   Assessment & Plan       Medical Decision Making  Differential diagnosis including but not limited to: vasovagal near-syncope, panic disorder, dehydration, heat exhaustion, cardiac arrhythmia, MI, ACS, PE, metabolic abnormality, intracranial process, seizure, anemia, GI bleed, dehydration.     Pt is a pleasant 68 y/o male presenting to the ED via EMS s/p event in which he had been working in elevated temperatures and then went to the store - began feeling flushed, lightheaded, and dyspneic then drove home and had sensation of near syncope. Has had somewhat similar symptoms in the past when he felt very overwhelmed while wife was sick. EMS put cold packs in axilla and groin en route. Normothermic here, shivering secondary due to the cold objects. Alert and Ox4.     Suspect panic attack of unknown etiology occurring pta after being in the heat briefly. Magnesium slightly low - replenishing. No neuro deficits, chest pain, focal weakness, syncope, n/v/d. He is feeling improved after valium and would like to be discharged with his wife, they are both agreeable to return precautions and follow-up. Hemodynamically stable, no acute distress at time of discharge.         Amount and/or Complexity of Data Reviewed  Labs: ordered.  Radiology: ordered.    Risk  OTC drugs.  Prescription drug management.             Medications   sodium chloride 0.9 % bolus  "1,000 mL (0 mL Intravenous Stopped 6/24/25 1758)   diazepam (VALIUM) injection 2.5 mg (2.5 mg Intravenous Given 6/24/25 1725)       ED Risk Strat Scores   HEART Risk Score      Flowsheet Row Most Recent Value   Heart Score Risk Calculator    History 0 Filed at: 06/26/2025 1605   ECG 0 Filed at: 06/26/2025 1605   Age 2 Filed at: 06/26/2025 1605   Risk Factors 1 Filed at: 06/26/2025 1605   Troponin 0 Filed at: 06/26/2025 1605   HEART Score 3 Filed at: 06/26/2025 1605          HEART Risk Score      Flowsheet Row Most Recent Value   Heart Score Risk Calculator    History 0 Filed at: 06/26/2025 1605   ECG 0 Filed at: 06/26/2025 1605   Age 2 Filed at: 06/26/2025 1605   Risk Factors 1 Filed at: 06/26/2025 1605   Troponin 0 Filed at: 06/26/2025 1605   HEART Score 3 Filed at: 06/26/2025 1605                      No data recorded                            History of Present Illness       Chief Complaint   Patient presents with    Heat Exposure     Pt to ED via EMS from home after working outside. Pt reports dizziness, headache, \"feeling shakey and unwell.\"        Past Medical History[1]   Past Surgical History[2]   Family History[3]   Social History[4]   E-Cigarette/Vaping    E-Cigarette Use Former User       E-Cigarette/Vaping Substances    Nicotine No     THC No     CBD No     Flavoring No     Other No     Unknown No       I have reviewed and agree with the history as documented.     Pt is a pleasant 68 y/o male presenting to the ED via EMS s/p event in which he had been working in elevated temperatures and then went to the store - began feeling flushed, lightheaded, and dyspneic then drove home and had sensation of near syncope. Has had somewhat similar symptoms in the past when he felt very overwhelmed while wife was sick. EMS put cold packs in axilla and groin en route. Normothermic here, shivering secondary due to the cold objects. Alert and Ox4.           Review of Systems   Constitutional:  Positive for fatigue. "   Respiratory:  Positive for shortness of breath.    Gastrointestinal:  Positive for nausea.   Neurological:  Positive for tremors, syncope (near-syncope) and light-headedness.   Psychiatric/Behavioral:  The patient is nervous/anxious.    All other systems reviewed and are negative.          Objective       ED Triage Vitals   Temperature Pulse Blood Pressure Respirations SpO2 Patient Position - Orthostatic VS   06/24/25 1648 06/24/25 1648 06/24/25 1648 06/24/25 1648 06/24/25 1648 --   98 °F (36.7 °C) 87 153/78 18 100 %       Temp src Heart Rate Source BP Location FiO2 (%) Pain Score    -- 06/24/25 1730 -- -- --     Monitor         Vitals      Date and Time Temp Pulse SpO2 Resp BP Pain Score FACES Pain Rating User   06/24/25 1730 -- 66 99 % 18 128/67 -- -- DLB   06/24/25 1648 98 °F (36.7 °C) 87 100 % 18 153/78 -- -- MD            Physical Exam  Vitals and nursing note reviewed.   Constitutional:       General: He is in acute distress (very anxious).      Appearance: Normal appearance. He is not ill-appearing, toxic-appearing or diaphoretic.   HENT:      Head: Normocephalic and atraumatic.      Nose: Nose normal. No congestion or rhinorrhea.      Mouth/Throat:      Mouth: Mucous membranes are dry.      Pharynx: Oropharynx is clear. No oropharyngeal exudate or posterior oropharyngeal erythema.     Eyes:      General: No scleral icterus.        Right eye: No discharge.         Left eye: No discharge.      Extraocular Movements: Extraocular movements intact.      Conjunctiva/sclera: Conjunctivae normal.      Pupils: Pupils are equal, round, and reactive to light.     Neck:      Vascular: No carotid bruit.     Cardiovascular:      Rate and Rhythm: Normal rate and regular rhythm.      Pulses: Normal pulses.      Heart sounds: Normal heart sounds. No murmur heard.     No friction rub. No gallop.   Pulmonary:      Effort: Pulmonary effort is normal. No respiratory distress.      Breath sounds: Normal breath sounds. No  stridor. No wheezing, rhonchi or rales.   Chest:      Chest wall: No tenderness.   Abdominal:      General: Abdomen is flat. Bowel sounds are normal. There is no distension.      Palpations: Abdomen is soft. There is no mass.      Tenderness: There is no abdominal tenderness. There is no right CVA tenderness, left CVA tenderness, guarding or rebound.      Hernia: No hernia is present.     Musculoskeletal:         General: No swelling, tenderness, deformity or signs of injury. Normal range of motion.      Cervical back: Normal range of motion and neck supple. No rigidity or tenderness.      Right lower leg: No edema.      Left lower leg: No edema.   Lymphadenopathy:      Cervical: No cervical adenopathy.     Skin:     General: Skin is warm and dry.      Coloration: Skin is not jaundiced or pale.      Findings: No bruising, erythema, lesion or rash.     Neurological:      General: No focal deficit present.      Mental Status: He is alert and oriented to person, place, and time.      Cranial Nerves: No cranial nerve deficit.      Sensory: No sensory deficit.      Motor: No weakness.      Coordination: Coordination normal.      Gait: Gait normal.      Deep Tendon Reflexes: Reflexes normal.     Psychiatric:         Behavior: Behavior normal.      Comments: Very anxious         Results Reviewed       Procedure Component Value Units Date/Time    Blood gas, venous [[id number]]  (Abnormal) Collected: 06/24/25 1848    Lab Status: Final result Specimen: Blood from Arm, Left Updated: 06/24/25 1858     pH, Mikey 7.400     pCO2, Mikey 39.4 mm Hg      pO2, Mikey 40.4 mm Hg      HCO3, Mikey 23.9 mmol/L      Base Excess, Mikey -0.8 mmol/L      O2 Content, Mikey 14.6 ml/dL      O2 HGB, VENOUS 74.7 %     HS Troponin 0hr (reflex protocol) [[id number]]  (Normal) Collected: 06/24/25 1721    Lab Status: Final result Specimen: Blood from Arm, Left Updated: 06/24/25 1748     hs TnI 0hr <2 ng/L     Comprehensive metabolic panel [[id number]]  (Abnormal)  Collected: 06/24/25 1721    Lab Status: Final result Specimen: Blood from Arm, Left Updated: 06/24/25 1740     Sodium 138 mmol/L      Potassium 3.3 mmol/L      Chloride 104 mmol/L      CO2 18 mmol/L      ANION GAP 16 mmol/L      BUN 15 mg/dL      Creatinine 1.00 mg/dL      Glucose 112 mg/dL      Calcium 10.1 mg/dL      AST 13 U/L      ALT 8 U/L      Alkaline Phosphatase 53 U/L      Total Protein 6.5 g/dL      Albumin 4.3 g/dL      Total Bilirubin 0.61 mg/dL      eGFR 76 ml/min/1.73sq m     Narrative:      National Kid[employer] Foundation guidelines for Chronic Kidney Disease (CKD):     Stage 1 with normal or high GFR (GFR > 90 mL/min/1.73 square meters)    Stage 2 Mild CKD (GFR = 60-89 mL/min/1.73 square meters)    Stage 3A Moderate CKD (GFR = 45-59 mL/min/1.73 square meters)    Stage 3B Moderate CKD (GFR = 30-44 mL/min/1.73 square meters)    Stage 4 Severe CKD (GFR = 15-29 mL/min/1.73 square meters)    Stage 5 End Stage CKD (GFR <15 mL/min/1.73 square meters)  Note: GFR calculation is accurate only with a steady state creatinine    Magnesium [[id number]]  (Abnormal) Collected: 06/24/25 1721    Lab Status: Final result Specimen: Blood from Arm, Left Updated: 06/24/25 1740     Magnesium 1.5 mg/dL     CBC and differential [[id number]] Collected: 06/24/25 1721    Lab Status: Final result Specimen: Blood from Arm, Left Updated: 06/24/25 1727     WBC 9.52 Thousand/uL      RBC 4.66 Million/uL      Hemoglobin 14.2 g/dL      Hematocrit 40.2 %      MCV 86 fL      MCH 30.5 pg      MCHC 35.3 g/dL      RDW 12.7 %      MPV 9.5 fL      Platelets 256 Thousands/uL      nRBC 0 /100 WBCs      Segmented % 66 %      Immature Grans % 1 %      Lymphocytes % 23 %      Monocytes % 10 %      Eosinophils Relative 0 %      Basophils Relative 0 %      Absolute Neutrophils 6.36 Thousands/µL      Absolute Immature Grans 0.05 Thousand/uL      Absolute Lymphocytes 2.15 Thousands/µL      Absolute Monocytes 0.90 Thousand/µL      Eosinophils Absolute  0.03 Thousand/µL      Basophils Absolute 0.03 Thousands/µL             XR chest 1 view portable   Final Interpretation by Abdirashid Martin MD (06/25 1105)      No acute cardiopulmonary disease.      Suspected small foreign body along the right lower lateral chest wall      Workstation performed: [id number]             ECG 12 Lead Documentation Only    Date/Time: 6/24/2025 4:57 PM    Performed by: Rikki Monae DO  Authorized by: Rikki Monae DO    Indications / Diagnosis:  Near syncope  ECG reviewed by me, the ED Provider: yes    Patient location:  ED  Previous ECG:     Previous ECG:  Compared to current    Similarity:  No change    Comparison to cardiac monitor: Yes    Interpretation:     Interpretation: non-specific    Quality:     Tracing quality:  Limited by artifact  Rate:     ECG rate:  84    ECG rate assessment: normal    Rhythm:     Rhythm: sinus rhythm    Ectopy:     Ectopy: none    QRS:     QRS axis:  Normal    QRS intervals:  Normal  Conduction:     Conduction: normal    ST segments:     ST segments:  Normal  T waves:     T waves: normal    Comments:      No STEMI. Prolonged QT.      ED Medication and Procedure Management   Prior to Admission Medications   Prescriptions Last Dose Informant Patient Reported? Taking?   ALPRAZolam (XANAX) 1 mg tablet  Self No No   Sig: TAKE ONE TABLET BY MOUTH DAILY AS NEEDED FOR ANXIETY   Azelaic Acid (Finacea) 15 % cream  Self No No   Sig: Use on face twice daily   clindamycin (CLINDAGEL) 1 % gel  Self No No   Sig: Apply topically 2 (two) times a day Apply to nose/back   levothyroxine 25 mcg tablet  Self No No   Sig: TAKE ONE TABLET BY MOUTH ONE TIME DAILY   rosuvastatin (CRESTOR) 5 mg tablet   No No   Sig: Take 1 tablet (5 mg total) by mouth daily   zolpidem (AMBIEN) 10 mg tablet  Self No No   Sig: TAKE ONE TABLET BY MOUTH AT BEDTIME AS NEEDED FOR SLEEP      Facility-Administered Medications Last Administration Doses Remaining    cyanocobalamin injection 1,000 mcg 2/15/2023 11:55 AM         Discharge Medication List as of 6/24/2025  7:10 PM        START taking these medications    Details   hydrOXYzine HCL (ATARAX) 25 mg tablet Take 1 tablet (25 mg total) by mouth every 6 (six) hours, Starting Tue 6/24/2025, Normal      magnesium Oxide (MagOx 400) 400 mg TABS Take 1 tablet (400 mg total) by mouth 2 (two) times a day, Starting Tue 6/24/2025, Normal           CONTINUE these medications which have NOT CHANGED    Details   ALPRAZolam (XANAX) 1 mg tablet TAKE ONE TABLET BY MOUTH DAILY AS NEEDED FOR ANXIETY, Normal      Azelaic Acid (Finacea) 15 % cream Use on face twice daily, Normal      clindamycin (CLINDAGEL) 1 % gel Apply topically 2 (two) times a day Apply to nose/back, Starting Mon 8/28/2023, Normal      levothyroxine 25 mcg tablet TAKE ONE TABLET BY MOUTH ONE TIME DAILY, Starting Thu 4/24/2025, Normal      rosuvastatin (CRESTOR) 5 mg tablet Take 1 tablet (5 mg total) by mouth daily, Starting Thu 5/22/2025, Normal      zolpidem (AMBIEN) 10 mg tablet TAKE ONE TABLET BY MOUTH AT BEDTIME AS NEEDED FOR SLEEP, Starting Thu 4/24/2025, Normal           No discharge procedures on file.  ED SEPSIS DOCUMENTATION   Time reflects when diagnosis was documented in both MDM as applicable and the Disposition within this note       Time User Action Codes Description Comment    6/24/2025  7:04 PM Rikki Monae [T67.9XXA] Heat exposure, initial encounter     6/24/2025  7:04 PM Rikki Monae [F41.0] Panic attack     6/24/2025  7:06 PM Rikki Monae [E83.42] Hypomagnesemia                    [1]   Past Medical History:  Diagnosis Date    Abdominal pain     Anxiety     BPH with obstruction/lower urinary tract symptoms     BPH with obstruction/lower urinary tract symptoms     Chronic diarrhea     Colon polyp     Disease of thyroid gland     Hypogonadism in male     Lightheadedness     last assessed-8/20/2015    Nocturia      Nocturia     Partial small bowel obstruction (HCC)     last assessed-2016    Shoulder injury, left, initial encounter     resolved:2017    Sixth cranial nerve palsy     last assessed-2015    Sleep apnea     Testicular pain, left 2020    Weight loss    [2]   Past Surgical History:  Procedure Laterality Date    COLONOSCOPY      NASAL SINUS SURGERY Bilateral     TONSILLECTOMY     [3]   Family History  Problem Relation Name Age of Onset    Cancer Other grandmother     Diabetes Family fx     Other Other unknown fx         back disorder    Heart disease Brother     [4]   Social History  Tobacco Use    Smoking status: Former     Current packs/day: 0.00     Types: Cigarettes     Quit date: 1988     Years since quittin.0    Smokeless tobacco: Former     Types: Chew   Vaping Use    Vaping status: Former   Substance Use Topics    Alcohol use: Yes     Alcohol/week: 1.0 standard drink of alcohol     Types: 1 Cans of beer per week     Comment: social    Drug use: No        Rikki Monae, DO  [phone]

## 2025-06-24 NOTE — Clinical Note
Dima Dasam was seen and treated in our emergency department on 6/24/2025.                Diagnosis:     Dima  .    He may return on this date:          If you have any questions or concerns, please don't hesitate to call.      Hi Smith MD    ______________________________           _______________          _______________  Hospital Representative                              Date                                Time

## 2025-06-24 NOTE — Clinical Note
Dima Armendariz was seen and treated in our emergency department on 6/24/2025.                Diagnosis:     Dima  may return to work on return date.    He may return on this date: 06/27/2025         If you have any questions or concerns, please don't hesitate to call.      Rikki Monae, DO    ______________________________           _______________          _______________  Hospital Representative                              Date                                Time

## 2025-06-24 NOTE — DISCHARGE INSTRUCTIONS
Continue taking Mag-Ox for magnesium supplementation.  Stay well-hydrated at home.  May try Atarax for anxiety as well.  Please return to the ED with any new or worsening symptoms, recurrence of symptoms including altered mental status, vomiting, chest pain, dyspnea, focal weakness, severe pain, rash.

## 2025-07-03 NOTE — ED ATTENDING ATTESTATION
6/24/2025  IHi MD, saw and evaluated the patient. I have discussed the patient with the resident/non-physician practitioner and agree with the resident's/non-physician practitioner's findings, Plan of Care, and MDM as documented in the resident's/non-physician practitioner's note, except where noted. All available labs and Radiology studies were reviewed.  I was present for key portions of any procedure(s) performed by the resident/non-physician practitioner and I was immediately available to provide assistance.       At this point I agree with the current assessment done in the Emergency Department.  I have conducted an independent evaluation of this patient a history and physical is as follows:SEE H AND P ABOVE- AGREE WITH ER RESIDENT TX PLAN/ DISPO     ED Course  ED Course as of 07/03/25 1345   Tue Jun 24, 2025   1717 ER MD NOTE- MOST RECENT LABS REVIEWED BY ER MD    1717 ER MD NOTE- PT SEEN AND THOROUGHLY EVALUATED BY ER MD - - 69 YR YR MALE-- MILD WORK OUTSIDE WITH [employer]-- NOTHING TO EXERTIONAL - AFTERWARDS DID NOT FEEL WELL- WIFE CALLED EMS- NO CP/SOB- NEAR/ SYNCOPE- NO RECENT ILLNESS-NEW MEDICATION -- MILDLY ANXIOUS APPEARING - MILD  HTN- IN NAD-  PULSE  % ON RA- INTERPRETATION IS NORMAL- NO INTERVENTION - RRR S1/S2-CTA/B- SOFT NT/ND- EQUAL BILATERAL RADIAL/DP PULSES- NO BLE EDEMA/CALF TENDERNESS/ASYM/ ERYTHEMA-  NORMAL NON FOCAL NEURO EXAM- NORMAL CN EXAM- NORMAL BUE/BLE STRENGTH/SENSATION    1724 ER MD NOTE-  12 LEAD ECG REVIEWED BY ER MD - NSR RATE OF 84-0 IVC D- COMPARED TO PREVIOUS 5/4/17- OTHER THAN INCREASED RATE- NO OTHER SIGN CHANGES   1726 CXR PORTABLE- NO OLD CXR FOR COMPARISON - NORMAL MEDIASTINUM/CARDIAC SILHOUETTE- NO FREE/SQ AIR- NO INFILTRATE- PTX- PULM EDEMA- PLERUAL EFFUSIONS    1728 ER MD NOTE- 12/24 ALBS AND RECENT CT CALCIUM SCORE REVIEWED BY ER MD    1901 ER MD NOTE- PT  RE EVAL-- PT FEELS IMPROVED- TOLERATED  AMBULATION  - HAS NO COMPS- WILL D/C           Critical Care Time  Procedures

## 2025-07-23 ENCOUNTER — OFFICE VISIT (OUTPATIENT)
Dept: FAMILY MEDICINE CLINIC | Facility: CLINIC | Age: 70
End: 2025-07-23
Payer: MEDICARE

## 2025-07-23 VITALS
SYSTOLIC BLOOD PRESSURE: 126 MMHG | DIASTOLIC BLOOD PRESSURE: 68 MMHG | OXYGEN SATURATION: 99 % | WEIGHT: 198 LBS | RESPIRATION RATE: 17 BRPM | HEART RATE: 73 BPM | BODY MASS INDEX: 26.24 KG/M2 | HEIGHT: 73 IN | TEMPERATURE: 98.1 F

## 2025-07-23 DIAGNOSIS — I95.89 EXERTIONAL HYPOTENSION: ICD-10-CM

## 2025-07-23 DIAGNOSIS — T67.5XXD HEAT EXHAUSTION, SUBSEQUENT ENCOUNTER: ICD-10-CM

## 2025-07-23 DIAGNOSIS — N13.8 BPH WITH OBSTRUCTION/LOWER URINARY TRACT SYMPTOMS: ICD-10-CM

## 2025-07-23 DIAGNOSIS — N40.1 BPH WITH OBSTRUCTION/LOWER URINARY TRACT SYMPTOMS: ICD-10-CM

## 2025-07-23 DIAGNOSIS — F41.9 ANXIETY: ICD-10-CM

## 2025-07-23 DIAGNOSIS — Q80.9 XERODERMA: ICD-10-CM

## 2025-07-23 DIAGNOSIS — R42 DIZZINESS: Primary | ICD-10-CM

## 2025-07-23 PROCEDURE — G2211 COMPLEX E/M VISIT ADD ON: HCPCS | Performed by: NURSE PRACTITIONER

## 2025-07-23 PROCEDURE — 99214 OFFICE O/P EST MOD 30 MIN: CPT | Performed by: NURSE PRACTITIONER

## 2025-07-23 RX ORDER — TAMSULOSIN HYDROCHLORIDE 0.4 MG/1
0.4 CAPSULE ORAL
COMMUNITY
Start: 2025-06-18 | End: 2026-06-18

## 2025-07-23 NOTE — PROGRESS NOTES
Name: Dima Armendariz      : 1955      MRN: [id number]  Encounter Provider: SRUTHI Dubose  Encounter Date: 2025   Encounter department: [facility] [place]  :  Assessment & Plan  Dizziness  Cardiac workup reviewed  Patient endorses improvements       Exertional hypotension         BPH with obstruction/lower urinary tract symptoms  Continue management per urology       Xeroderma  Discussed supportive care management       Anxiety         Heat exhaustion, subsequent encounter  Recovered fully  Discussed preventive measures for future              History of Present Illness   69-year-old male known to me presents for follow-up of multiple issues      1.  He experienced episode of heat exhaustion during recent heat wave.  Outside temperature 103.  He was moving things around from his truck.  He overheated.  He became confused.  Experienced change in mental status, required evaluation at emergency department.  He recovered after treatment.  We discussed the episode in detail.    2.  He is ongoing nocturia and frequency  He is requesting urology referral at [facility]    3.  Last visit we discussed episodes of dizziness while exercising.  He had comprehensive cardiac workup  We reviewed this in detail  Since making some changes, these episodes are less frequent        Review of Systems   Constitutional:  Negative for fatigue and unexpected weight change.   HENT:  Negative for trouble swallowing.    Eyes:  Negative for visual disturbance.   Respiratory: Negative.     Cardiovascular: Negative.    Gastrointestinal:         Per HPI   Genitourinary:  Negative for dysuria and hematuria.        Nocturia related to enlarged prostate   Musculoskeletal:  Positive for arthralgias and back pain.   Skin:         Pruritus scalp   Neurological: Negative.    Psychiatric/Behavioral:  Negative for sleep disturbance. The patient is nervous/anxious.        Objective   /68 (BP Location: Left arm,  "Patient Position: Sitting, Cuff Size: Standard)   Pulse 73   Temp 98.1 °F (36.7 °C) (Temporal)   Resp 17   Ht 6' 1\" (1.854 m)   Wt 89.8 kg (198 lb)   SpO2 99%   BMI 26.12 kg/m²      Physical Exam  Vitals and nursing note reviewed.   Constitutional:       General: He is not in acute distress.     Appearance: Normal appearance.     Eyes:      Pupils: Pupils are equal, round, and reactive to light.     Neck:      Vascular: No carotid bruit.     Cardiovascular:      Rate and Rhythm: Normal rate and regular rhythm.      Pulses: Normal pulses.   Pulmonary:      Effort: Pulmonary effort is normal.      Breath sounds: Normal breath sounds.   Abdominal:      General: Bowel sounds are normal.      Palpations: Abdomen is soft.      Tenderness: There is no abdominal tenderness.     Musculoskeletal:      Right lower leg: No edema.      Left lower leg: No edema.   Lymphadenopathy:      Cervical: No cervical adenopathy.     Skin:     General: Skin is warm and dry.     Neurological:      General: No focal deficit present.      Mental Status: He is alert. Mental status is at baseline.     Psychiatric:         Mood and Affect: Mood normal.         "